# Patient Record
Sex: MALE | Race: WHITE | NOT HISPANIC OR LATINO | Employment: STUDENT | URBAN - METROPOLITAN AREA
[De-identification: names, ages, dates, MRNs, and addresses within clinical notes are randomized per-mention and may not be internally consistent; named-entity substitution may affect disease eponyms.]

---

## 2017-08-09 ENCOUNTER — HOSPITAL ENCOUNTER (OUTPATIENT)
Dept: CARDIOLOGY | Facility: CLINIC | Age: 22
Discharge: HOME | End: 2017-08-09
Payer: COMMERCIAL

## 2017-08-09 ENCOUNTER — CLINICAL SUPPORT (OUTPATIENT)
Dept: PEDIATRIC CARDIOLOGY | Facility: CLINIC | Age: 22
End: 2017-08-09
Payer: COMMERCIAL

## 2017-08-09 ENCOUNTER — OFFICE VISIT (OUTPATIENT)
Dept: CARDIOLOGY | Facility: CLINIC | Age: 22
End: 2017-08-09
Payer: COMMERCIAL

## 2017-08-09 VITALS
SYSTOLIC BLOOD PRESSURE: 125 MMHG | DIASTOLIC BLOOD PRESSURE: 78 MMHG | HEIGHT: 69 IN | WEIGHT: 128.5 LBS | BODY MASS INDEX: 19.03 KG/M2 | HEART RATE: 85 BPM | OXYGEN SATURATION: 99 %

## 2017-08-09 DIAGNOSIS — Q20.3 D-TGA (DEXTRO-TRANSPOSITION OF GREAT ARTERIES): Primary | ICD-10-CM

## 2017-08-09 DIAGNOSIS — Q21.0 VSD (VENTRICULAR SEPTAL DEFECT): ICD-10-CM

## 2017-08-09 DIAGNOSIS — Z87.74 S/P COARCTATION STENT: ICD-10-CM

## 2017-08-09 DIAGNOSIS — Q21.0 VSD (VENTRICULAR SEPTAL DEFECT): Primary | ICD-10-CM

## 2017-08-09 DIAGNOSIS — Z95.828 S/P COARCTATION STENT: ICD-10-CM

## 2017-08-09 DIAGNOSIS — Q24.9 ADULT CONGENITAL HEART DISEASE: ICD-10-CM

## 2017-08-09 DIAGNOSIS — Q20.3 D-TGA (DEXTRO-TRANSPOSITION OF GREAT ARTERIES): ICD-10-CM

## 2017-08-09 DIAGNOSIS — Z98.890 PERSONAL HISTORY OF SURGERY TO HEART AND GREAT VESSELS, PRESENTING HAZARDS TO HEALTH: ICD-10-CM

## 2017-08-09 DIAGNOSIS — Q20.3 TRANSPOSITION OF THE GREAT ARTERIES: ICD-10-CM

## 2017-08-09 DIAGNOSIS — Q25.1 AORTIC COARCTATION: ICD-10-CM

## 2017-08-09 PROCEDURE — 3008F BODY MASS INDEX DOCD: CPT | Mod: S$GLB,,, | Performed by: PEDIATRICS

## 2017-08-09 PROCEDURE — 93303 ECHO TRANSTHORACIC: CPT | Mod: S$GLB,,, | Performed by: PEDIATRICS

## 2017-08-09 PROCEDURE — 99999 PR PBB SHADOW E&M-EST. PATIENT-LVL III: CPT | Mod: PBBFAC,,, | Performed by: PEDIATRICS

## 2017-08-09 PROCEDURE — 93325 DOPPLER ECHO COLOR FLOW MAPG: CPT | Mod: S$GLB,,, | Performed by: PEDIATRICS

## 2017-08-09 PROCEDURE — 93000 ELECTROCARDIOGRAM COMPLETE: CPT | Mod: S$GLB,,, | Performed by: INTERNAL MEDICINE

## 2017-08-09 PROCEDURE — 99214 OFFICE O/P EST MOD 30 MIN: CPT | Mod: S$GLB,,, | Performed by: PEDIATRICS

## 2017-08-09 PROCEDURE — 93227 XTRNL ECG REC<48 HR R&I: CPT | Mod: S$GLB,,, | Performed by: PEDIATRICS

## 2017-08-09 PROCEDURE — 93320 DOPPLER ECHO COMPLETE: CPT | Mod: S$GLB,,, | Performed by: PEDIATRICS

## 2017-08-10 LAB
DIASTOLIC DYSFUNCTION: NO
ESTIMATED PA SYSTOLIC PRESSURE: 35.76
RETIRED EF AND QEF - SEE NOTES: 55 (ref 55–65)
TRICUSPID VALVE REGURGITATION: NORMAL

## 2017-08-10 NOTE — PROGRESS NOTES
2017    re:Livia May  :1995    [facility]    Isaías Lambert MD  [address] [facility] [address]        Patient ID:  Livia May is a 22 y.o. male who presents for follow-up of complex congenital heart disease.  To summarize, his diagnoses are as follows:  1.  TGA/VSD s/p arterial [facility]   2.  Coarctation stent ( -Claudio).  3.  Residual LV to RA shunt (1.4:1 shunt on cath , very small shunt noted on  cardiac catheterization)  4.  Normal coronaries on cath   5.  mild aortic insufficiency and aortic root enlargement  6.  Likely very mild branch pulmonary artery stenosis  7.  Likely PFO (cath )  8.  Mildly abnormal appearing tricuspid valve without significant tricuspid insufficiency or stenosis    Patient denies any symptoms related to the cardiovascular system.  No chest pain, shortness of breath, syncope, near syncope, palpitations, edema, headaches.  No chronic cough.      Review of Systems   Constitution: Negative.   HENT: Negative.    Eyes: Negative.    Cardiovascular: Negative.    Respiratory: Negative.    Endocrine: Negative.    Hematologic/Lymphatic: Negative.    Skin: Negative.    Musculoskeletal: Negative.    Gastrointestinal: Negative.    Genitourinary: Negative.    Neurological: Negative.    Psychiatric/Behavioral: Negative.    Allergic/Immunologic: Negative.      Past Medical History:   Diagnosis Date    Coarctation of aorta, congenital     Diaphragmatic paralysis     Strabismus     TGA/VSD (transposition of great arteries, ventricular septal defect)      Past Surgical History:   Procedure Laterality Date    ARTERIAL SWITCH W/ VENTRICULAR SEPTAL DEFECT CLOSURE      CARDIAC CATHETERIZATION  10/11/07    CARDIAC CATHETERIZATION  12    coarctation of aorta repair/with TGA switch      DIAPHRAGM PLICATION      STRABISMUS SURGERY       Family History   Problem Relation Age  "of Onset    Asthma Brother     Congenital heart disease Neg Hx     Early death Neg Hx      Social History     Social History    Marital status: Single     Spouse name: N/A    Number of children: N/A    Years of education: N/A     Social History Main Topics    Smoking status: Never Smoker    Smokeless tobacco: Never Used    Alcohol use No    Drug use: No    Sexual activity: Not Asked     Other Topics Concern    None     Social History Narrative    Just graduated from [employer] with a degree in general studies.  Looking for a job.     Current Outpatient Prescriptions on File Prior to Visit   Medication Sig Dispense Refill    lisinopril (PRINIVIL,ZESTRIL) 5 MG tablet TAKE 1 TABLET BY MOUTH DAILY **May Cause Dizziness** 30 tablet 12    lisinopril (PRINIVIL,ZESTRIL) 5 MG tablet TAKE 1 TABLET BY MOUTH DAILY **May Cause Dizziness** 30 tablet 12     No current facility-administered medications on file prior to visit.      Review of patient's allergies indicates:  No Known Allergies    /78 (BP Location: Left leg, Patient Position: Sitting, BP Method: Automatic)   Pulse 85   Ht 5' 9" (1.753 m)   Wt 58.3 kg (128 lb 8.5 oz)   SpO2 99%   BMI 18.98 kg/m² RL BP 93/52       Objective:    Physical Exam   Constitutional: He is oriented to person, place, and time. He appears well-developed and well-nourished. No distress.   HENT:   Head: Normocephalic and atraumatic.   Right Ear: External ear normal.   Left Ear: External ear normal.   Nose: Nose normal.   Mouth/Throat: Oropharynx is clear and moist. No oropharyngeal exudate.   Eyes: Conjunctivae and EOM are normal. Pupils are equal, round, and reactive to light. Right eye exhibits no discharge. Left eye exhibits no discharge. No scleral icterus.   Neck: Normal range of motion. Neck supple. No JVD present. No tracheal deviation present. No thyromegaly present.   Cardiovascular: Normal rate, regular rhythm, S1 normal, S2 normal and intact distal pulses.  " Exam reveals no gallop and no friction rub.    Murmur heard.  High-pitched blowing early systolic murmur is present with a grade of 1/6  at the lower left sternal border  Pulses:       Carotid pulses are 2+ on the right side, and 2+ on the left side.       Radial pulses are 2+ on the right side, and 2+ on the left side.        Femoral pulses are 2+ on the right side, and 2+ on the left side.       Dorsalis pedis pulses are 2+ on the right side, and 2+ on the left side.   2/6 harsh, squeaky murmur at LLSB and apex   Pulmonary/Chest: Effort normal and breath sounds normal. No stridor. No respiratory distress. He has no wheezes. He has no rales. He exhibits no tenderness.   well healed sternotomy   Abdominal: Soft. Bowel sounds are normal. He exhibits no distension and no mass. There is no tenderness. There is no rebound and no guarding.   Musculoskeletal: Normal range of motion. He exhibits no edema, tenderness or deformity.   Lymphadenopathy:     He has no cervical adenopathy.   Neurological: He is alert and oriented to person, place, and time. No cranial nerve deficit. He exhibits normal muscle tone. Coordination normal.   Skin: Skin is warm and dry. He is not diaphoretic.   Psychiatric: He has a normal mood and affect. His behavior is normal. Judgment and thought content normal.         ECHO: TGA/VSD s/p ASO and VSD closure. Tiny residual VSD, probably LV to RA shunt. Coarctation stent widely patent.  (peak velocity 2.3 m/s without  run off pattern).  Branch PAs difficult to visualize.  Great function.  The tricuspid valve looks a little bit abnormal and floppy, but it works normally.  There is mild aortic valve insufficiency.  It is difficult to image the right ventricular outflow tract.    EKG with normal sinus rhythm and a RBBB - no change    CPX 2016:  Moderate functional impairment associated with a borderline reduced breathing reserve, normal oxygen saturation, a good effort, and a borderline reduced AT.  These findings are indicative of functional impairment secondary to deconditioning and possible   ventilatory impairment.  4) The PkVO2 was 29.9 ml/kg/min which is 62% of predicted equating to a functional capacity of 8.5 METS indicating moderate functional impairment    I reviewed the cardiac catheterization report from January 12, 2012:  1.  A small residual left ventricle to right atrial shunt was noted.  2.  The coarctation was stented with a 26 x 10 Tito LD stent  3.  There was about a 14-16 mmHg gradient from the right ventricle into the branch pulmonary arteries    A cardiac catheterization in 2007 revealed normal coronary arteries.     Plan:  1. Continue lisinopril  2. No activity restriction   3. SBEP is indicated due to residual VSD  4. Follow up in 1 year with cardiac MRI, EKG, echocardiogram  5. Holter today    Discussion:  He looks great.  We discussed the common problems associated with his surgeries.  My biggest worry is the risk of hypertension worsening over time.  We discussed the risk of passing this on to offspring down the line.  We discussed long term insurance needs as well.      Thank you for referring this patient to our clinic.  Please call with any questions.    Sincerely,        Patrick Godfrey MD  Pediatric Cardiology  Adult Congenital Heart Disease  Pediatric Heart Failure and Transplantation  [facility] [address] [place]  [address]  [phone]

## 2017-08-18 ENCOUNTER — TELEPHONE (OUTPATIENT)
Dept: PEDIATRIC CARDIOLOGY | Facility: CLINIC | Age: 22
End: 2017-08-18

## 2017-12-18 RX ORDER — LISINOPRIL 5 MG/1
TABLET ORAL
Qty: 30 TABLET | Refills: 12 | Status: ON HOLD | OUTPATIENT
Start: 2017-12-18 | End: 2018-06-13 | Stop reason: HOSPADM

## 2017-12-18 RX ORDER — LISINOPRIL 5 MG/1
TABLET ORAL
Qty: 30 TABLET | Refills: 12 | Status: SHIPPED | OUTPATIENT
Start: 2017-12-18 | End: 2020-09-17

## 2018-02-20 ENCOUNTER — TELEPHONE (OUTPATIENT)
Dept: PEDIATRIC CARDIOLOGY | Facility: CLINIC | Age: 23
End: 2018-02-20
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
## 2018-02-21 ENCOUNTER — TELEPHONE (OUTPATIENT)
Dept: PEDIATRIC CARDIOLOGY | Facility: CLINIC | Age: 23
End: 2018-02-21

## 2018-02-21 NOTE — TELEPHONE ENCOUNTER
Spoke with mom via telephone. Patient having tachycardia with little exertion. Mom would like holter. Ok per Dr. Godfrey. Will mail out holter. Informed mom to let patient continue with daily activities and if symptoms persist or worsen to be evaluated by ED. Informed mom to let office know if additional symptoms develop.    ----- Message from Liza Jackson sent at 2/21/2018  3:41 PM CST -----  Contact: Mom Natalee [phone]  Mom states that today,  patient has been having a rapid heartbeat after very little exertion. Mom states that she would like to get the  holter for patient. Please advise.

## 2018-02-23 ENCOUNTER — CLINICAL SUPPORT (OUTPATIENT)
Dept: PEDIATRIC CARDIOLOGY | Facility: CLINIC | Age: 23
End: 2018-02-23
Attending: PEDIATRICS
Payer: COMMERCIAL

## 2018-02-23 DIAGNOSIS — Q21.0 VSD (VENTRICULAR SEPTAL DEFECT): ICD-10-CM

## 2018-02-23 DIAGNOSIS — Z87.74 S/P COARCTATION STENT: ICD-10-CM

## 2018-02-23 DIAGNOSIS — Z95.828 S/P COARCTATION STENT: ICD-10-CM

## 2018-02-23 DIAGNOSIS — Q24.9 ADULT CONGENITAL HEART DISEASE: ICD-10-CM

## 2018-02-23 PROCEDURE — 93227 XTRNL ECG REC<48 HR R&I: CPT | Mod: S$GLB,,, | Performed by: PEDIATRICS

## 2018-03-08 ENCOUNTER — TELEPHONE (OUTPATIENT)
Dept: PEDIATRIC CARDIOLOGY | Facility: CLINIC | Age: 23
End: 2018-03-08

## 2018-03-08 NOTE — TELEPHONE ENCOUNTER
Holter discussed with the mother.  Patient feels much better with resolved palpitations.  Holter with one brief run of SVT and rare PACs.  Plan:  1.  No treatment at present  2.  If symptoms return, call to discuss beta blocker.  3.  Prolonged palpitations should prompt an ER visit.  Mom is an ER NP and is knowledgeable about this.  4.  Follow up as planned.

## 2018-06-09 ENCOUNTER — HOSPITAL ENCOUNTER (INPATIENT)
Facility: HOSPITAL | Age: 23
LOS: 3 days | Discharge: HOME | DRG: 273 | End: 2018-06-13
Attending: INTERNAL MEDICINE | Admitting: INTERNAL MEDICINE
Payer: COMMERCIAL

## 2018-06-09 DIAGNOSIS — R55 SYNCOPE AND COLLAPSE: ICD-10-CM

## 2018-06-09 DIAGNOSIS — Q20.5: ICD-10-CM

## 2018-06-09 DIAGNOSIS — Q20.3 TRANSPOSITION OF THE GREAT ARTERIES: ICD-10-CM

## 2018-06-09 DIAGNOSIS — Q20.3 TRANSPOSITION GREAT ARTERIES: ICD-10-CM

## 2018-06-09 DIAGNOSIS — Z87.74 S/P COARCTATION STENT: ICD-10-CM

## 2018-06-09 DIAGNOSIS — Q25.1 AORTIC COARCTATION: ICD-10-CM

## 2018-06-09 DIAGNOSIS — Z95.828 S/P COARCTATION STENT: ICD-10-CM

## 2018-06-09 DIAGNOSIS — Q24.9 ADULT CONGENITAL HEART DISEASE: ICD-10-CM

## 2018-06-09 DIAGNOSIS — Z98.890 PERSONAL HISTORY OF SURGERY TO HEART AND GREAT VESSELS, PRESENTING HAZARDS TO HEALTH: ICD-10-CM

## 2018-06-09 DIAGNOSIS — R07.9 CHEST PAIN: ICD-10-CM

## 2018-06-09 DIAGNOSIS — I10 ESSENTIAL HYPERTENSION: ICD-10-CM

## 2018-06-09 DIAGNOSIS — R55 SYNCOPE: ICD-10-CM

## 2018-06-09 DIAGNOSIS — Q21.0 VSD (VENTRICULAR SEPTAL DEFECT): ICD-10-CM

## 2018-06-09 DIAGNOSIS — R55 VASOVAGAL SYNCOPE: Primary | ICD-10-CM

## 2018-06-09 LAB
ABO + RH BLD: NORMAL
ALBUMIN SERPL BCP-MCNC: 4.1 G/DL
ALP SERPL-CCNC: 63 U/L
ALT SERPL W/O P-5'-P-CCNC: 16 U/L
AMPHET+METHAMPHET UR QL: NEGATIVE
ANION GAP SERPL CALC-SCNC: 7 MMOL/L
AST SERPL-CCNC: 14 U/L
BARBITURATES UR QL SCN>200 NG/ML: NEGATIVE
BASOPHILS # BLD AUTO: 0.03 K/UL
BASOPHILS NFR BLD: 0.3 %
BENZODIAZ UR QL SCN>200 NG/ML: NEGATIVE
BILIRUB SERPL-MCNC: 2.4 MG/DL
BLD GP AB SCN CELLS X3 SERPL QL: NORMAL
BNP SERPL-MCNC: 47 PG/ML
BUN SERPL-MCNC: 12 MG/DL
BZE UR QL SCN: NEGATIVE
CALCIUM SERPL-MCNC: 9.1 MG/DL
CANNABINOIDS UR QL SCN: NEGATIVE
CHLORIDE SERPL-SCNC: 106 MMOL/L
CO2 SERPL-SCNC: 25 MMOL/L
CREAT SERPL-MCNC: 0.8 MG/DL
CREAT UR-MCNC: 113 MG/DL
DIFFERENTIAL METHOD: ABNORMAL
EOSINOPHIL # BLD AUTO: 0.1 K/UL
EOSINOPHIL NFR BLD: 0.6 %
ERYTHROCYTE [DISTWIDTH] IN BLOOD BY AUTOMATED COUNT: 12.6 %
EST. GFR  (AFRICAN AMERICAN): >60 ML/MIN/1.73 M^2
EST. GFR  (NON AFRICAN AMERICAN): >60 ML/MIN/1.73 M^2
ESTIMATED AVG GLUCOSE: 85 MG/DL
ETHANOL UR-MCNC: <10 MG/DL
GLUCOSE SERPL-MCNC: 100 MG/DL
HBA1C MFR BLD HPLC: 4.6 %
HCT VFR BLD AUTO: 43.3 %
HGB BLD-MCNC: 14.6 G/DL
IMM GRANULOCYTES # BLD AUTO: 0.05 K/UL
IMM GRANULOCYTES NFR BLD AUTO: 0.6 %
INR PPP: 1.2
LYMPHOCYTES # BLD AUTO: 1.5 K/UL
LYMPHOCYTES NFR BLD: 17 %
MAGNESIUM SERPL-MCNC: 2.4 MG/DL
MCH RBC QN AUTO: 31.9 PG
MCHC RBC AUTO-ENTMCNC: 33.7 G/DL
MCV RBC AUTO: 95 FL
METHADONE UR QL SCN>300 NG/ML: NEGATIVE
MONOCYTES # BLD AUTO: 0.6 K/UL
MONOCYTES NFR BLD: 7 %
NEUTROPHILS # BLD AUTO: 6.4 K/UL
NEUTROPHILS NFR BLD: 74.5 %
NRBC BLD-RTO: 0 /100 WBC
OPIATES UR QL SCN: NEGATIVE
PCP UR QL SCN>25 NG/ML: NEGATIVE
PHOSPHATE SERPL-MCNC: 3.3 MG/DL
PLATELET # BLD AUTO: 164 K/UL
PMV BLD AUTO: 10.4 FL
POTASSIUM SERPL-SCNC: 3.7 MMOL/L
PROT SERPL-MCNC: 7 G/DL
PROTHROMBIN TIME: 12.1 SEC
RBC # BLD AUTO: 4.57 M/UL
SODIUM SERPL-SCNC: 138 MMOL/L
TOXICOLOGY INFORMATION: NORMAL
TROPONIN I SERPL DL<=0.01 NG/ML-MCNC: <0.006 NG/ML
TSH SERPL DL<=0.005 MIU/L-ACNC: 0.88 UIU/ML
WBC # BLD AUTO: 8.58 K/UL

## 2018-06-09 PROCEDURE — 93010 ELECTROCARDIOGRAM REPORT: CPT | Mod: 76,,, | Performed by: INTERNAL MEDICINE

## 2018-06-09 PROCEDURE — 84443 ASSAY THYROID STIM HORMONE: CPT

## 2018-06-09 PROCEDURE — 83880 ASSAY OF NATRIURETIC PEPTIDE: CPT

## 2018-06-09 PROCEDURE — 84100 ASSAY OF PHOSPHORUS: CPT

## 2018-06-09 PROCEDURE — 84484 ASSAY OF TROPONIN QUANT: CPT

## 2018-06-09 PROCEDURE — G0378 HOSPITAL OBSERVATION PER HR: HCPCS

## 2018-06-09 PROCEDURE — 99223 1ST HOSP IP/OBS HIGH 75: CPT | Mod: ,,, | Performed by: HOSPITALIST

## 2018-06-09 PROCEDURE — 85025 COMPLETE CBC W/AUTO DIFF WBC: CPT

## 2018-06-09 PROCEDURE — 93005 ELECTROCARDIOGRAM TRACING: CPT

## 2018-06-09 PROCEDURE — 80307 DRUG TEST PRSMV CHEM ANLYZR: CPT

## 2018-06-09 PROCEDURE — 93010 ELECTROCARDIOGRAM REPORT: CPT | Mod: ,,, | Performed by: INTERNAL MEDICINE

## 2018-06-09 PROCEDURE — 80053 COMPREHEN METABOLIC PANEL: CPT

## 2018-06-09 PROCEDURE — 25000003 PHARM REV CODE 250: Performed by: HOSPITALIST

## 2018-06-09 PROCEDURE — 86850 RBC ANTIBODY SCREEN: CPT

## 2018-06-09 PROCEDURE — S5010 5% DEXTROSE AND 0.45% SALINE: HCPCS | Performed by: HOSPITALIST

## 2018-06-09 PROCEDURE — 83735 ASSAY OF MAGNESIUM: CPT

## 2018-06-09 PROCEDURE — 83036 HEMOGLOBIN GLYCOSYLATED A1C: CPT

## 2018-06-09 PROCEDURE — 85610 PROTHROMBIN TIME: CPT

## 2018-06-09 RX ORDER — IBUPROFEN 200 MG
16 TABLET ORAL
Status: DISCONTINUED | OUTPATIENT
Start: 2018-06-09 | End: 2018-06-13 | Stop reason: HOSPADM

## 2018-06-09 RX ORDER — IBUPROFEN 200 MG
24 TABLET ORAL
Status: DISCONTINUED | OUTPATIENT
Start: 2018-06-09 | End: 2018-06-13 | Stop reason: HOSPADM

## 2018-06-09 RX ORDER — ONDANSETRON 8 MG/1
8 TABLET, ORALLY DISINTEGRATING ORAL EVERY 8 HOURS PRN
Status: DISCONTINUED | OUTPATIENT
Start: 2018-06-09 | End: 2018-06-13 | Stop reason: HOSPADM

## 2018-06-09 RX ORDER — ACETAMINOPHEN 325 MG/1
650 TABLET ORAL EVERY 4 HOURS PRN
Status: DISCONTINUED | OUTPATIENT
Start: 2018-06-09 | End: 2018-06-13 | Stop reason: HOSPADM

## 2018-06-09 RX ORDER — GLUCAGON 1 MG
1 KIT INJECTION
Status: DISCONTINUED | OUTPATIENT
Start: 2018-06-09 | End: 2018-06-13 | Stop reason: HOSPADM

## 2018-06-09 RX ORDER — LISINOPRIL 5 MG/1
5 TABLET ORAL DAILY
Status: DISCONTINUED | OUTPATIENT
Start: 2018-06-09 | End: 2018-06-13 | Stop reason: HOSPADM

## 2018-06-09 RX ORDER — SODIUM CHLORIDE 0.9 % (FLUSH) 0.9 %
5 SYRINGE (ML) INJECTION
Status: DISCONTINUED | OUTPATIENT
Start: 2018-06-09 | End: 2018-06-13 | Stop reason: HOSPADM

## 2018-06-09 RX ORDER — DEXTROSE MONOHYDRATE AND SODIUM CHLORIDE 5; .45 G/100ML; G/100ML
INJECTION, SOLUTION INTRAVENOUS ONCE
Status: COMPLETED | OUTPATIENT
Start: 2018-06-09 | End: 2018-06-09

## 2018-06-09 RX ADMIN — LISINOPRIL 5 MG: 5 TABLET ORAL at 09:06

## 2018-06-09 RX ADMIN — DEXTROSE AND SODIUM CHLORIDE: 5; .45 INJECTION, SOLUTION INTRAVENOUS at 09:06

## 2018-06-09 NOTE — CONSULTS
Patient with complex congenital heart disease that follows with Dr. Patrick Godfrey that presents with syncope and a past history of SVT on Holter. Would consult pediatric electrophysiology for evaluation.    Ortiz Recinos MD, MPH  PGY-V  Cardiovascular Disease Fellow    I did not evaluate this patient. Pedi EP to evaluate. If adult EP input is desired, please call or consult.  IVANA Ley

## 2018-06-09 NOTE — CONSULTS
Thank you for referring your patient Livia May to the Adult Congenital Cardiology Service for consultation. The patient is accompanied by his mother and father. Please review my findings below.    CHIEF COMPLAINT: Evaluation of syncopal event with congenital heart disease.    Consulting Physician: Padma Jimenez MD    HISTORY OF PRESENT ILLNESS:   Livia May is a 22 y.o. male who presents for follow-up of complex congenital heart disease who has been followed by Dr. Patrick Godfrey in the Adult Cardiology clinic here.  To summarize, his diagnoses are as follows:  1.  TGA/VSD s/p arterial switch procedure and VSD closure (Dr. Raygoza)   2.  Coarctation stent (2012 -Claudio).  3.  Residual LV to RA shunt (1.4:1 shunt on cath 2007, very small shunt noted on 2012 cardiac catheterization)  4.  Normal coronaries on cath 2007  5.  Mild aortic insufficiency and aortic root enlargement  6.  Likely very mild branch pulmonary artery stenosis  7.  Likely PFO (cath 2007)  8.  Mildly abnormal appearing tricuspid valve without significant tricuspid insufficiency or stenosis     HPI:  He was doing well until earlier yesterday around 6 pm when  suddenly he became dizzy and his vision became blurry while trimming his nails standing up in the kitchen. He turned pale and diaphoretic, then passed out while his mother was there with him. His parents sat him in a chair initially and then laid him on the floor when he did not recover quickly.  She was unable to feel a pulse at the time.  In the Feb 2018 he had a holter which showed single run of short nonsustained SVT.  She activated EMS and he slowly gained consciousness prior to EMS arrival. By the time he arrived at the ED he was back to baseline.      Per Livia he does not routinely have orthostatic dizziness.  He has never passed out in the past.  He had heart racing in February prior to the Holter but none noted during Holter and none since.  He has no fatigue or activity  intolerance.  He has no difficulty breathing.  He has no other recent chest pain.      REVIEW OF SYSTEMS:     GENERAL: No fever, chills, fatigability or weight loss.  SKIN: No rashes, itching or changes in color or texture of skin.  CHEST: Denies ZARCO, cyanosis, wheezing, cough and sputum production.  CARDIOVASCULAR: Denies chest pain or reduced exercise tolerance.  ABDOMEN: Appetite fine. No weight loss. Denies diarrhea, abdominal pain, or vomiting.  PERIPHERAL VASCULAR: No claudication or cyanosis.  MUSCULOSKELETAL: No joint stiffness or swelling.   NEUROLOGIC: No history of seizures,  alteration of gait or coordination.    PAST MEDICAL HISTORY:   Past Medical History:   Diagnosis Date    Coarctation of aorta, congenital     Diaphragmatic paralysis     Strabismus     TGA/VSD (transposition of great arteries, ventricular septal defect)            FAMILY HISTORY:   Family History   Problem Relation Age of Onset    Asthma Brother     Congenital heart disease Neg Hx     Early death Neg Hx          SOCIAL HISTORY:   Social History     Social History    Marital status: Single     Spouse name: N/A    Number of children: N/A    Years of education: N/A     Occupational History    Not on file.     Social History Main Topics    Smoking status: Never Smoker    Smokeless tobacco: Never Used    Alcohol use No    Drug use: No    Sexual activity: Not on file     Other Topics Concern    Not on file     Social History Narrative    Just graduated from [employer] with a degree in general studies.  Looking for a job.       ALLERGIES:  Review of patient's allergies indicates:  No Known Allergies    MEDICATIONS:    Current Facility-Administered Medications:     acetaminophen tablet 650 mg, 650 mg, Oral, Q4H PRN, Arup K. Joyce, DO    dextrose 50% injection 12.5 g, 12.5 g, Intravenous, PRN, Arup K. Joyce, DO    dextrose 50% injection 25 g, 25 g, Intravenous, PRN, Arup K. Joyce, DO    glucagon (human recombinant)  injection 1 mg, 1 mg, Intramuscular, PRN, Arup K. Joyce, DO    glucose chewable tablet 16 g, 16 g, Oral, PRN, Arup K. Joyce, DO    glucose chewable tablet 24 g, 24 g, Oral, PRN, Arup K. Joyce, DO    lisinopril tablet 5 mg, 5 mg, Oral, Daily, Arup K. Joyce, DO, 5 mg at 18 0900    ondansetron disintegrating tablet 8 mg, 8 mg, Oral, Q8H PRN, Arup K. Joyce, DO    sodium chloride 0.9% flush 5 mL, 5 mL, Intravenous, PRN, Arup K. Joyce, DO      PHYSICAL EXAM:   Vitals:    18 0409 18 0410 18 0600 18 0742   BP: 116/61 116/61  118/63   BP Location:    Right arm   Patient Position: Sitting Standing  Lying   Pulse: 89  81 82   Resp:    16   Temp:    97.9 °F (36.6 °C)   TempSrc:    Oral   SpO2:    95%   Weight:       Height:             GENERAL: Awake, well-developed well-nourished, no apparent distress  HEENT: mucous membranes moist and pink, normocephalic atraumatic, no cranial or carotid bruits, sclera anicteric  NECK: no jugular venous distention, no lymphadenopathy  CHEST: Good air movement, clear to auscultation bilaterally, pectus deformity noted.  CARDIOVASCULAR: Quiet precordium, regular rate and rhythm, S1S2, no rubs or gallops, High-pitched blowing early systolic murmur is present with a grade of 1/6  at the lower left sternal border  ABDOMEN: Soft, nontender nondistended, no hepatomegaly, no aortic bruits  EXTREMITIES: Warm well perfused, 2+ radial/pedal pulses, capillary refill 2 seconds, no clubbing, cyanosis, or edema  NEURO: Alert and oriented, cooperative with exam, face symmetric, moves all extremities well    STUDIES:  ECG:  Normal sinus rhythm, right bundle branch block    ASSESSMENT:  21 y/o male with congenital heart disease includin.  TGA/VSD s/p arterial switch procedure and VSD closure (Dr. Raygoza)   2.  Coarctation stent (2012).  3.  Residual LV to RA shunt (1.4:1 shunt on cath , very small shunt noted on  cardiac catheterization)  4.  Normal  coronaries on cath 2007  5.  Mild aortic insufficiency and aortic root enlargement  6.  Likely very mild branch pulmonary artery stenosis  7.  Likely PFO (cath 2007)  8.  Mildly abnormal appearing tricuspid valve without significant tricuspid insufficiency or stenosis.  9. History of nonsustained SVT on Holter 2/2018.  10. Unexplained syncope for which he is currently admitted (consider neurocardiogenic syncope vs. Primary arrhythmia in face of congenital heart disease).       PLAN:   - Cardiac CTA to evaluate coronary arteries.    - Agree with obtaining ECHO to evaluate function and evaluate coarctation stent gradient and look for residual shunt.  - Consult Adult EP tomorrow to evaluate and perform EP study and possible ablation and possible loop implant that would be done either Monday or Tuesday (I spoke with Dr. Ley who agreed to see Livia).  - No need to be NPO at this time, will need to be NPO as per - Dr. Ley prior to anticipated EP study.  - No cardiac medications at this time.  - Dr. Godfrey will see Livia tomorrow for our [facility] service.  - Please call with any additional questions or concerns.      Time Spent: 60 (min) with over 50% in direct patient and family consultation regarding evaluation and management of syncope in face of congenital heart disease.      I hope this brings you up-to-date on Livia ALFARO Fell  Please contact me with any questions or concerns.    Arvind Acuña MD  Pediatric and Adult Congenital Electrophysiologist  Pediatric Cardiologist

## 2018-06-09 NOTE — NURSING
Arrive with ems accompanied by mother  Transferred to bed without difficulty  nsr per tele vss  Verbalized understanding of fall precautions, I&O, call bell

## 2018-06-09 NOTE — H&P
"[facility] Hospital Medicine  History & Physical    Patient Name: Livia May  MRN: [id number]  Admission Date: 6/9/2018  Attending Physician: Padma Jimenez MD   Primary Care Provider: Isaías Lambert MD    [facility] Team: Networked reference to record PCT  Darrion Cook DO     Patient information was obtained from patient, parent and outside records .     Subjective:     Principal Problem:Syncope    Chief Complaint: " I passed out and collapsed"       Reason for transfer:  COMPLEX CONGENITAL HEART PATIENT WITH SYNCOPE     Report from Physician/Mid-Level Provider:  Livia May is a 22 y.o. male who presents for follow-up of complex congenital heart disease who has been followed by Dr. Patrick Godfrey in the Adult Cardiology clinic here.  To summarize, his diagnoses are as follows:  1.  TGA/VSD s/p arterial switch procedure and VSD closure (Dr. LongoriaBanner)   2.  Coarctation stent (2012 -[facility]audio).  3.  Residual LV to RA shunt (1.4:1 shunt on cath 2007, very small shunt noted on 2012 cardiac catheterization)  4.  Normal coronaries on cath 2007  5.  Mild aortic insufficiency and aortic root enlargement  6.  Likely very mild branch pulmonary artery stenosis  7.  Likely PFO (cath 2007)  8.  Mildly abnormal appearing tricuspid valve without significant tricuspid insufficiency or stenosis     HPI:  He was doing well until earlier yesterday around 6 pm when  suddenly he became dizzy and his vision became blurry while trimming his nails standing up in the kitchen. He turned pale and diaphoretic, then passed out while his mother was there with him. Mother caught him and lay him to the floor to prevent the fall.  She was unable to feel a pulse at the time.  In the past he has had a holter which showed SVT.  She activated EMS and he slowly gained consciousness prior to EMS arrival. By the time he arrived at the ED he was back to baseline.  The pediatric Cardiologist there felt he needed to come to " [facility], so cardiology was contacted and is willing to assist in his care. He has annual clinic visit coming up with Dr. Godfrey in august.      VS: /77, P 88, RR 13, %, T98.0F     LABS: BMP - NML, CBC - NML, TROP - NEGATIVE, BNP - NML     RADIOGRAPHS:  CXR: no acute findings     EKG: RBBB with no changes from old, and no ischemic changes     Currently he is asymptomatic. Conversant, AAO X 4. Mother at bedside reports he looks back to his normal self.     Past Medical History:   Diagnosis Date    Coarctation of aorta, congenital     Diaphragmatic paralysis     Strabismus     TGA/VSD (transposition of great arteries, ventricular septal defect)        Past Surgical History:   Procedure Laterality Date    ARTERIAL SWITCH W/ VENTRICULAR SEPTAL DEFECT CLOSURE      CARDIAC CATHETERIZATION  10/11/07    CARDIAC CATHETERIZATION  1/17/12    coarctation of aorta repair/with TGA switch      DIAPHRAGM PLICATION      STRABISMUS SURGERY         Review of patient's allergies indicates:  No Known Allergies    No current facility-administered medications on file prior to encounter.      Current Outpatient Prescriptions on File Prior to Encounter   Medication Sig    lisinopril (PRINIVIL,ZESTRIL) 5 MG tablet TAKE 1 TABLET BY MOUTH DAILY **May Cause Dizziness**    lisinopril (PRINIVIL,ZESTRIL) 5 MG tablet TAKE 1 TABLET BY MOUTH DAILY **May Cause Dizziness**     Family History     Problem Relation (Age of Onset)    Asthma Brother        Social History Main Topics    Smoking status: Never Smoker    Smokeless tobacco: Never Used    Alcohol use No    Drug use: No    Sexual activity: Not on file     Review of Systems   Constitutional: Negative for activity change, appetite change, chills, diaphoresis, fatigue, fever and unexpected weight change.   HENT: Negative for congestion, dental problem, drooling, ear discharge, ear pain, facial swelling, hearing loss, mouth sores, nosebleeds, postnasal drip, rhinorrhea,  sinus pressure, sneezing, sore throat, tinnitus, trouble swallowing and voice change.    Eyes: Negative for photophobia, pain, discharge, redness, itching and visual disturbance.   Respiratory: Negative for apnea, cough, choking, chest tightness, shortness of breath, wheezing and stridor.    Cardiovascular: Negative for chest pain, palpitations and leg swelling.   Gastrointestinal: Negative for abdominal distention, abdominal pain, anal bleeding, blood in stool, constipation, diarrhea, nausea, rectal pain and vomiting.   Endocrine: Negative for cold intolerance, heat intolerance, polydipsia, polyphagia and polyuria.   Genitourinary: Negative for decreased urine volume, difficulty urinating, discharge, dysuria, enuresis, flank pain, frequency, genital sores, hematuria, penile pain, penile swelling, scrotal swelling, testicular pain and urgency.   Musculoskeletal: Negative for arthralgias, back pain, gait problem, joint swelling, myalgias, neck pain and neck stiffness.   Skin: Positive for color change. Negative for pallor, rash and wound.   Allergic/Immunologic: Negative for environmental allergies, food allergies and immunocompromised state.   Neurological: Positive for syncope. Negative for dizziness, tremors, seizures, facial asymmetry, speech difficulty, weakness, light-headedness, numbness and headaches.   Hematological: Negative for adenopathy. Does not bruise/bleed easily.   Psychiatric/Behavioral: Negative for agitation, behavioral problems, confusion, decreased concentration, dysphoric mood, hallucinations, self-injury, sleep disturbance and suicidal ideas. The patient is not nervous/anxious and is not hyperactive.      Objective:     Vital Signs (Most Recent):  Temp: 97.5 °F (36.4 °C) (06/09/18 0315)  Pulse: 81 (06/09/18 0600)  Resp: 14 (06/09/18 0315)  BP: 116/61 (06/09/18 0410)  SpO2: 95 % (06/09/18 0315) Vital Signs (24h Range):  Temp:  [97.5 °F (36.4 °C)-98 °F (36.7 °C)] 97.5 °F (36.4 °C)  Pulse:   [] 81  Resp:  [12-16] 14  SpO2:  [95 %-100 %] 95 %  BP: (116-141)/(60-76) 116/61     Weight: 57.4 kg (126 lb 8.7 oz)  Body mass index is 18.69 kg/m².    Physical Exam   Constitutional: He is oriented to person, place, and time. He appears well-developed and well-nourished. No distress.   HENT:   Head: Normocephalic and atraumatic.   Nose: Nose normal.   Mouth/Throat: Oropharynx is clear and moist. No oropharyngeal exudate.   Eyes: Conjunctivae and EOM are normal. Pupils are equal, round, and reactive to light. No scleral icterus.   Neck: Normal range of motion. Neck supple. No JVD present. No tracheal deviation present. No thyromegaly present.   Cardiovascular: Normal rate, regular rhythm and intact distal pulses.    Murmur (II/VI systolic murmurs along sternal border and apex ) heard.  Pulmonary/Chest: Effort normal and breath sounds normal. No respiratory distress. He has no wheezes. He has no rales. He exhibits no tenderness.   Abdominal: Soft. Bowel sounds are normal. He exhibits no distension and no mass. There is no tenderness. There is no rebound and no guarding.   Musculoskeletal: Normal range of motion. He exhibits no edema or tenderness.   Lymphadenopathy:     He has no cervical adenopathy.   Neurological: He is alert and oriented to person, place, and time. He has normal reflexes. He displays normal reflexes. No cranial nerve deficit. He exhibits normal muscle tone. Coordination normal.   Skin: Skin is warm and dry. No rash noted. He is not diaphoretic. No erythema.   Psychiatric: He has a normal mood and affect. Judgment and thought content normal.         CRANIAL NERVES     CN III, IV, VI   Pupils are equal, round, and reactive to light.  Extraocular motions are normal.       Significant Labs:       Significant Imaging: I have reviewed and interpreted all pertinent imaging results/findings within the past 24 hours.     CXR : no acute process     EKG : NSR @ 80 bpm, RBB and RVH with right axis  deviation      2D ECHO ( 08/09/17 ) :    Imaging consistent with diagnosis of d-TGA s/p arterial switch & coarctation s/p stent:   There is prolapse of the tricuspid valve leaflets into the right atrium with reasonable coaptation and mild tricuspid regurgitation at velocity suggesting right ventricular pressure 36 mmHg plus right atrial pressure.  Right ventricle appears normal in size with qualitatively good function.  Color Doppler suggests at least a mild degree of pulmonary insufficiency.  Main and pulmonary branches are not well demonstrated in this study with the anatomy confounded by Rhonda maneuver for arterial switch.  Left ventricle appears normal in size and structure.  Paradoxical septal motion with good movement of the left ventricular free wall,  SF=30 % and EF estimated  55 -60% from apical four chamber views   Diastolic function is normal.   Images suggest but not diagnostic for trileaflet aortic valve with trivial to mild aortic regurgitation.  The ascending aorta appears dilated but is not clearly demonstrated.  Echodensity consistent with stent in descending aorta at site of coarctation with peak velocity <2.3 m/s and no diastolic runoff    Assessment/Plan:     Active Diagnoses:    Diagnosis Date Noted POA    PRINCIPAL PROBLEM:  Syncope [R55] 06/09/2018 Yes    Essential hypertension [I10] 06/09/2018 Yes    Adult congenital heart disease [Q24.9] 05/19/2016 Not Applicable      Problems Resolved During this Admission:    Diagnosis Date Noted Date Resolved POA     # Syncope and collapse   - history and clinical presentation concerning for cardiac arrhythmia   - though vasovagal syncope in DDX given event occurred in standing position with associated diaphoresis   - check troponin and urine toxicology   - EKG showed RBBB and RVH ( no acute change )  - telemetry   - 2DECHO W doppler   -EP consult   - patient well known to Dr. Godfrey from adult congenital heart disease clinic   - continue home dose  of lisinopril 5 mg daily   - NPO for now pending EP evaluation       VTE Risk Mitigation         Ordered     IP VTE LOW RISK PATIENT  Once      06/09/18 0602            Darrion Cook DO  Department of Hospital Medicine   [facility]

## 2018-06-09 NOTE — PLAN OF CARE
Please call extension [phone] (if nobody answers after pressing #1 for admission, this will flip to a beeper after pressing #1 for admission, so put in your call back number) upon patient arrival to floor for Hospital Medicine admit team assignment and for additional admit orders for the patient.       Outside Transfer Acceptance Note     Patients name: BEVERLEY MCKEON MRN: [id number]    Transferring Physician or Mid-Level provider/Clinic giving report:   DR. MALLOYR AT [facility] IN [place]    Accepting Physician for admission to hospital: Eric Bunn M.D.      Date of acceptance:  6/8/18, LEVEL 2, TELEMETRY     Reason for transfer:  COMPLEX CONGENITAL HEART PATIENT WITH SYNCOPE    Report from Physician/Mid-Level Provider:  Beverley Mckeon is a 22 y.o. male who presents for follow-up of complex congenital heart disease who has been followed by Dr. Patrick Godfrey in the Adult Cardiology clinic here.  To summarize, his diagnoses are as follows:  1.  TGA[facility]VSD [facility]   2.  Coarctation stent (2012 -[facility]).  3.  Residual LV to RA shunt (1.4:1 shunt on cath 2007, very small shunt noted on 2012 cardiac catheterization)  4.  Normal coronaries on cath 2007  5.  Mild aortic insufficiency and aortic root enlargement  6.  Likely very mild branch pulmonary artery stenosis  7.  Likely PFO (cath 2007)  8.  Mildly abnormal appearing tricuspid valve without significant tricuspid insufficiency or stenosis    HPI:  He was doing well until earlier today when he suddenly he became dizzy and his vision became blurry while trimming his nails. He turned pale and diaphoretic, then passed out while his mother was there with him.  She was unable to feel a pulse at the time.  In the past he has had a holter which showed SVT.  She activated EMS and by the time he arrived at the ED he was back to baseline.  The pediatric Cardiologist there felt he needed to come here, so cardiology  was contacted and is willing to assist in his care.    VS: /77, P 88, RR 13, %, T98.0F    LABS: BMP - NML, CBC - NML, TROP - NEGATIVE, BNP - NML    RADIOGRAPHS:  CXR: no acute findings    EKG: RBBB with no changes from old, and no ischemic changes    To Do List upon arrival:  Place on IMC and consult Cardiology and EP, telemetry    Please call extension [phone] (if nobody answers after pressing #1 for admission, this will flip to a beeper after pressing #1 for admission, so put in your call back number) upon patient arrival to floor for Hospital Medicine admit team assignment and for additional admit orders for the patient.

## 2018-06-10 PROBLEM — Q20.5: Status: ACTIVE | Noted: 2018-06-10

## 2018-06-10 PROBLEM — R07.89 CHEST TIGHTNESS OR PRESSURE: Status: ACTIVE | Noted: 2018-06-10

## 2018-06-10 PROCEDURE — 25000003 PHARM REV CODE 250: Performed by: HOSPITALIST

## 2018-06-10 PROCEDURE — 25000003 PHARM REV CODE 250: Performed by: NURSE PRACTITIONER

## 2018-06-10 PROCEDURE — 99232 SBSQ HOSP IP/OBS MODERATE 35: CPT | Mod: ,,, | Performed by: PEDIATRICS

## 2018-06-10 PROCEDURE — 99255 IP/OBS CONSLTJ NEW/EST HI 80: CPT | Mod: ,,, | Performed by: INTERNAL MEDICINE

## 2018-06-10 PROCEDURE — 99232 SBSQ HOSP IP/OBS MODERATE 35: CPT | Mod: ,,, | Performed by: NURSE PRACTITIONER

## 2018-06-10 PROCEDURE — G0378 HOSPITAL OBSERVATION PER HR: HCPCS

## 2018-06-10 RX ORDER — POTASSIUM CHLORIDE 750 MG/1
40 CAPSULE, EXTENDED RELEASE ORAL ONCE
Status: COMPLETED | OUTPATIENT
Start: 2018-06-10 | End: 2018-06-10

## 2018-06-10 RX ADMIN — LISINOPRIL 5 MG: 5 TABLET ORAL at 09:06

## 2018-06-10 RX ADMIN — POTASSIUM CHLORIDE 40 MEQ: 750 CAPSULE, EXTENDED RELEASE ORAL at 09:06

## 2018-06-10 NOTE — ASSESSMENT & PLAN NOTE
- during episode of sinus tachycardia, ECG without changes or ischemia   - troponin on admit < 0.006

## 2018-06-10 NOTE — ASSESSMENT & PLAN NOTE
- Pediatric EP and adult EP have both seen and collaborating on patient care  - planning CTA to look at anatomy  - EPS to follow with possible PPM, ablation, ICD vs ILR.

## 2018-06-10 NOTE — SUBJECTIVE & OBJECTIVE
Past Medical History:   Diagnosis Date    Coarctation of aorta, congenital     Diaphragmatic paralysis     Strabismus     TGA/VSD (transposition of great arteries, ventricular septal defect)        Past Surgical History:   Procedure Laterality Date    ARTERIAL SWITCH W/ VENTRICULAR SEPTAL DEFECT CLOSURE      CARDIAC CATHETERIZATION  10/11/07    CARDIAC CATHETERIZATION  1/17/12    coarctation of aorta repair/with TGA switch      DIAPHRAGM PLICATION      STRABISMUS SURGERY         Review of patient's allergies indicates:  No Known Allergies    No current facility-administered medications on file prior to encounter.      Current Outpatient Prescriptions on File Prior to Encounter   Medication Sig    lisinopril (PRINIVIL,ZESTRIL) 5 MG tablet TAKE 1 TABLET BY MOUTH DAILY **May Cause Dizziness**    lisinopril (PRINIVIL,ZESTRIL) 5 MG tablet TAKE 1 TABLET BY MOUTH DAILY **May Cause Dizziness**     Family History     Problem Relation (Age of Onset)    Asthma Brother        Social History Main Topics    Smoking status: Never Smoker    Smokeless tobacco: Never Used    Alcohol use No    Drug use: No    Sexual activity: Not on file     Review of Systems   Constitution: Negative for chills and fever.   HENT: Negative for hoarse voice and sore throat.    Eyes: Positive for blurred vision.   Cardiovascular: Positive for palpitations. Negative for chest pain, claudication, cyanosis, dyspnea on exertion, irregular heartbeat, leg swelling, near-syncope, orthopnea, paroxysmal nocturnal dyspnea and syncope.   Respiratory: Negative for cough, hemoptysis and shortness of breath.    Musculoskeletal: Negative for back pain, joint pain and joint swelling.   Gastrointestinal: Negative for abdominal pain, constipation, diarrhea, hematochezia, melena, nausea and vomiting.   Genitourinary: Negative for dysuria, hematuria and incomplete emptying.   Neurological: Negative for dizziness, headaches and light-headedness.    Psychiatric/Behavioral: Negative for altered mental status, depression and suicidal ideas. The patient is not nervous/anxious.      Objective:     Vital Signs (Most Recent):  Temp: 98.2 °F (36.8 °C) (06/10/18 0937)  Pulse: 94 (06/10/18 0937)  Resp: 18 (06/10/18 0937)  BP: 128/60 (06/10/18 0937)  SpO2: 99 % (06/10/18 0937) Vital Signs (24h Range):  Temp:  [96.3 °F (35.7 °C)-98.2 °F (36.8 °C)] 98.2 °F (36.8 °C)  Pulse:  [] 94  Resp:  [14-20] 18  SpO2:  [95 %-99 %] 99 %  BP: (109-135)/(55-68) 128/60     Weight: 57.4 kg (126 lb 8.7 oz)  Body mass index is 18.69 kg/m².    SpO2: 99 %  O2 Device (Oxygen Therapy): room air    Physical Exam   Constitutional: He is oriented to person, place, and time. He appears well-developed and well-nourished. No distress.   HENT:   Head: Normocephalic and atraumatic.   Mouth/Throat: Oropharynx is clear and moist. No oropharyngeal exudate.   Eyes: Conjunctivae and EOM are normal. Pupils are equal, round, and reactive to light. Right eye exhibits no discharge. Left eye exhibits no discharge. No scleral icterus.   Neck: Normal range of motion. Neck supple. No JVD present. No tracheal deviation present. No thyromegaly present.   Cardiovascular: Normal rate, regular rhythm and intact distal pulses.  Exam reveals no gallop and no friction rub.    Murmur (2/6 systolic murmur in LUSB) heard.  Pulmonary/Chest: Effort normal and breath sounds normal. No respiratory distress. He has no wheezes. He has no rales.   Abdominal: Soft. Bowel sounds are normal. He exhibits no distension and no mass. There is no tenderness. There is no rebound and no guarding.   Musculoskeletal: Normal range of motion.   Lymphadenopathy:     He has no cervical adenopathy.   Neurological: He is alert and oriented to person, place, and time. No cranial nerve deficit.   Skin: Skin is warm and dry. He is not diaphoretic.   Psychiatric: He has a normal mood and affect. His behavior is normal. Judgment and thought content  normal.   Nursing note and vitals reviewed.      Significant Labs:   EP:   Recent Labs  Lab 06/09/18  0617      K 3.7      CO2 25      BUN 12   CREATININE 0.8   CALCIUM 9.1   PROT 7.0   ALBUMIN 4.1   BILITOT 2.4*   ALKPHOS 63   AST 14   ALT 16   ANIONGAP 7*   ESTGFRAFRICA >60.0   EGFRNONAA >60.0   WBC 8.58   HGB 14.6   HCT 43.3      INR 1.2       Significant Imaging: EKG: NSR, RBBB, LPFB

## 2018-06-10 NOTE — PROGRESS NOTES
[facility] Hospital Medicine  Progress Note    Patient Name: Livia May  MRN: [id number]  Patient Class: OP- Observation   Admission Date: 6/9/2018  Length of Stay: 1 days  Attending Physician: Padma Jimenez MD  Primary Care Provider: Isaías Lambert MD    [facility] Medicine Team: [facility] ANGELINA Miles NP    Subjective:     Principal Problem:Syncope    HPI:  Livia May is a 22 y.o. male who presents for follow-up of complex congenital heart disease who has been followed by Dr. Patrick Godfrey in the Adult Cardiology clinic here.  To summarize, his diagnoses are as follows:  1.  TGA/VSD s/p arterial [facility]   2.  Coarctation stent (2012 -[facility]).  3.  Residual LV to RA shunt (1.4:1 shunt on cath 2007, very small shunt noted on 2012 cardiac catheterization)  4.  Normal coronaries on cath 2007  5.  Mild aortic insufficiency and aortic root enlargement  6.  Likely very mild branch pulmonary artery stenosis  7.  Likely PFO (cath 2007)  8.  Mildly abnormal appearing tricuspid valve without significant tricuspid insufficiency or stenosis     HPI:  He was doing well until earlier yesterday around 6 pm when  suddenly he became dizzy and his vision became blurry while trimming his nails standing up in the kitchen. He turned pale and diaphoretic, then passed out while his mother was there with him. Mother caught him and lay him to the floor to prevent the fall.  She was unable to feel a pulse at the time.  In the past he has had a holter which showed SVT.  She activated EMS and he slowly gained consciousness prior to EMS arrival. By the time he arrived at the ED he was back to baseline.  The pediatric Cardiologist there felt he needed to come to [facility], so cardiology was contacted and is willing to assist in his care. He has annual clinic visit coming up with Dr. Godfrey in august.      VS: /77, P 88, RR 13, %, T98.0F     LABS: BMP -  NML, CBC - NML, TROP - NEGATIVE, BNP - NML     RADIOGRAPHS:  CXR: no acute findings     EKG: RBBB with no changes from old, and no ischemic changes     Currently he is asymptomatic. Conversant, AAO X 4. Mother at bedside reports he looks back to his normal self.        Hospital Course:  Admitted to hospital medicine for witnessed syncopal episode.  He revived on his own.   Congenital cards, and pediatric EP were consulted as he has h/o NSVT (5 beats on a holter), he also was getting chest tightness with sinus tachycardia, which he states he normally doesn't get.  He was seen by Dr. Arvind Acuña from pediatric EP who believes this was a vasovagal episode, however with his anatomy, should have a full EPS.  Dr Ley was contacted to perform procedure as Dr. Acuña will be out of town.  Dr. Ley stated his DDx of syncope includes vasovagal, bradycardia (especially given RBBB/LPFB), VT/VF (though recovered without CPR or electrical cardioversion). Also with hx of nonsustained atrial arrhythmias, by report, on prior monitoring.  He agrees with Echo and CTA that are already planned by primary service.  Following those studies, plan is for EPS 6/11/18, with possible ablation, possible PPM vs ICD vs ILR.    Dispo: f/u pediatric EP and Dr. Patrick Godfrey with congenital cardiology, no home needs, mom is an ED NP      Interval History: Patient slept well, no c/o o/n.  No longer tachycardic and no longer having chest tightness. Denies SOB, CP, LH/ Dizziness    Review of Systems   Constitutional: Negative for activity change, appetite change, chills, fatigue and fever.   HENT: Negative for congestion, sore throat and trouble swallowing.    Respiratory: Positive for chest tightness (yesterday with sinus tach episode). Negative for cough, shortness of breath and wheezing.    Cardiovascular: Negative for chest pain, palpitations and leg swelling.   Gastrointestinal: Negative for abdominal pain, constipation, diarrhea, nausea and  vomiting.   Genitourinary: Negative for decreased urine volume, difficulty urinating and hematuria.   Musculoskeletal: Negative for back pain and myalgias.   Skin: Negative for color change, rash and wound.   Neurological: Negative for dizziness, weakness, light-headedness, numbness and headaches.   Hematological: Does not bruise/bleed easily.   Psychiatric/Behavioral: Negative for agitation, decreased concentration and sleep disturbance. The patient is not nervous/anxious.      Objective:     Vital Signs (Most Recent):  Temp: 98.2 °F (36.8 °C) (06/10/18 0937)  Pulse: 94 (06/10/18 0937)  Resp: 18 (06/10/18 0937)  BP: 128/60 (06/10/18 0937)  SpO2: 99 % (06/10/18 0937) Vital Signs (24h Range):  Temp:  [96.3 °F (35.7 °C)-98.2 °F (36.8 °C)] 98.2 °F (36.8 °C)  Pulse:  [] 94  Resp:  [14-20] 18  SpO2:  [95 %-99 %] 99 %  BP: (109-135)/(55-68) 128/60     Weight: 57.4 kg (126 lb 8.7 oz)  Body mass index is 18.69 kg/m².    Intake/Output Summary (Last 24 hours) at 06/10/18 1139  Last data filed at 06/09/18 2200   Gross per 24 hour   Intake              720 ml   Output             1650 ml   Net             -930 ml      Physical Exam   Constitutional: He is oriented to person, place, and time. He appears well-developed and well-nourished. No distress.   HENT:   Head: Normocephalic and atraumatic.   Right Ear: External ear normal.   Left Ear: External ear normal.   Nose: Nose normal.   Eyes: Conjunctivae are normal.   Neck: Normal range of motion. Neck supple. No JVD present.   Cardiovascular: Normal rate, regular rhythm, normal heart sounds and intact distal pulses.    No murmur heard.  Pulmonary/Chest: Effort normal and breath sounds normal. No respiratory distress. He has no wheezes. He has no rales.   Abdominal: Soft. Bowel sounds are normal. He exhibits no distension and no mass. There is no tenderness. There is no guarding.   Musculoskeletal: Normal range of motion. He exhibits no edema.   Neurological: He is alert and  oriented to person, place, and time. No cranial nerve deficit or sensory deficit. Coordination normal.   Skin: Skin is warm and dry. No rash noted. He is not diaphoretic. No erythema.   Psychiatric: He has a normal mood and affect. His behavior is normal. Judgment and thought content normal.   Nursing note and vitals reviewed.      Significant Labs:   BMP:   Recent Labs  Lab 06/09/18  0617         K 3.7      CO2 25   BUN 12   CREATININE 0.8   CALCIUM 9.1   MG 2.4     CBC:   Recent Labs  Lab 06/09/18  0617   WBC 8.58   HGB 14.6   HCT 43.3        Coagulation:   Recent Labs  Lab 06/09/18  0617   INR 1.2       Significant Imaging: No acute findings in the chest.    Assessment/Plan:      * Syncope    - Pediatric EP and adult EP have both seen and collaborating on patient care  - planning CTA to look at anatomy  - EPS to follow with possible PPM, ablation, ICD vs ILR.           Adult congenital heart disease    His diagnoses are as follows:  1.  TGA/VSD s/p arterial [facility]   2.  Coarctation stent (2012 -[facility]).  3.  Residual LV to RA shunt (1.4:1 shunt on cath 2007, very small shunt noted on 2012 cardiac catheterization)  4.  Normal coronaries on cath 2007  5.  Mild aortic insufficiency and aortic root enlargement  6.  Likely very mild branch pulmonary artery stenosis  7.  Likely PFO (cath 2007)  8.  Mildly abnormal appearing tricuspid valve without significant tricuspid insufficiency or stenosis          Essential hypertension    - related to his congenital heart disease.   - continue lisinopril           Chest tightness or pressure    - during episode of sinus tachycardia, ECG without changes or ischemia   - troponin on admit < 0.006              VTE Risk Mitigation         Ordered     IP VTE LOW RISK PATIENT  Once      06/09/18 0602              Gaby Miles NP  Department of Hospital Medicine   [facility]  Spectra:   [phone]  Pager: [phone]

## 2018-06-10 NOTE — ASSESSMENT & PLAN NOTE
His diagnoses are as follows:  1.  TGA/VSD s/p arterial switch procedure and VSD closure (Dr. Raygoza)   2.  Coarctation stent (2012 -[facility]).  3.  Residual LV to RA shunt (1.4:1 shunt on cath 2007, very small shunt noted on 2012 cardiac catheterization)  4.  Normal coronaries on cath 2007  5.  Mild aortic insufficiency and aortic root enlargement  6.  Likely very mild branch pulmonary artery stenosis  7.  Likely PFO (cath 2007)  8.  Mildly abnormal appearing tricuspid valve without significant tricuspid insufficiency or stenosis

## 2018-06-10 NOTE — HOSPITAL COURSE
Admitted to hospital medicine for witnessed syncopal episode.  He revived on his own.   Congenital cards, and pediatric EP were consulted as he has h/o NSVT (5 beats on a holter), he also was getting chest tightness with sinus tachycardia, which he states he normally doesn't get.  He was seen by Dr. Arvind Acuña from pediatric EP who believes this was a vasovagal episode, however with his anatomy, should have a full EPS.  Dr Ley was contacted to perform procedure as Dr. Acuña will be out of town.  Dr. Ley stated his DDx of syncope includes vasovagal, bradycardia (especially given RBBB/LPFB), VT/VF (though recovered without CPR or electrical cardioversion). Also with hx of nonsustained atrial arrhythmias, by report, on prior monitoring.  S/p Echo and CTA results below.  EPS 6/12/18 normal, s/p ILR.  Recovered well o/n, no issues post procedure.     Dispo: f/u pediatric EP and Dr. Patrick Godfrey with congenital cardiology, no home needs, mom is an ED NP

## 2018-06-10 NOTE — SUBJECTIVE & OBJECTIVE
Interval History: Patient slept well, no c/o o/n.  No longer tachycardic and no longer having chest tightness. Denies SOB, CP, LH/ Dizziness    Review of Systems   Constitutional: Negative for activity change, appetite change, chills, fatigue and fever.   HENT: Negative for congestion, sore throat and trouble swallowing.    Respiratory: Positive for chest tightness (yesterday with sinus tach episode). Negative for cough, shortness of breath and wheezing.    Cardiovascular: Negative for chest pain, palpitations and leg swelling.   Gastrointestinal: Negative for abdominal pain, constipation, diarrhea, nausea and vomiting.   Genitourinary: Negative for decreased urine volume, difficulty urinating and hematuria.   Musculoskeletal: Negative for back pain and myalgias.   Skin: Negative for color change, rash and wound.   Neurological: Negative for dizziness, weakness, light-headedness, numbness and headaches.   Hematological: Does not bruise/bleed easily.   Psychiatric/Behavioral: Negative for agitation, decreased concentration and sleep disturbance. The patient is not nervous/anxious.      Objective:     Vital Signs (Most Recent):  Temp: 98.2 °F (36.8 °C) (06/10/18 0937)  Pulse: 94 (06/10/18 0937)  Resp: 18 (06/10/18 0937)  BP: 128/60 (06/10/18 0937)  SpO2: 99 % (06/10/18 0937) Vital Signs (24h Range):  Temp:  [96.3 °F (35.7 °C)-98.2 °F (36.8 °C)] 98.2 °F (36.8 °C)  Pulse:  [] 94  Resp:  [14-20] 18  SpO2:  [95 %-99 %] 99 %  BP: (109-135)/(55-68) 128/60     Weight: 57.4 kg (126 lb 8.7 oz)  Body mass index is 18.69 kg/m².    Intake/Output Summary (Last 24 hours) at 06/10/18 1139  Last data filed at 06/09/18 2200   Gross per 24 hour   Intake              720 ml   Output             1650 ml   Net             -930 ml      Physical Exam   Constitutional: He is oriented to person, place, and time. He appears well-developed and well-nourished. No distress.   HENT:   Head: Normocephalic and atraumatic.   Right Ear: External  ear normal.   Left Ear: External ear normal.   Nose: Nose normal.   Eyes: Conjunctivae are normal.   Neck: Normal range of motion. Neck supple. No JVD present.   Cardiovascular: Normal rate, regular rhythm, normal heart sounds and intact distal pulses.    No murmur heard.  Pulmonary/Chest: Effort normal and breath sounds normal. No respiratory distress. He has no wheezes. He has no rales.   Abdominal: Soft. Bowel sounds are normal. He exhibits no distension and no mass. There is no tenderness. There is no guarding.   Musculoskeletal: Normal range of motion. He exhibits no edema.   Neurological: He is alert and oriented to person, place, and time. No cranial nerve deficit or sensory deficit. Coordination normal.   Skin: Skin is warm and dry. No rash noted. He is not diaphoretic. No erythema.   Psychiatric: He has a normal mood and affect. His behavior is normal. Judgment and thought content normal.   Nursing note and vitals reviewed.      Significant Labs:   BMP:   Recent Labs  Lab 06/09/18 0617         K 3.7      CO2 25   BUN 12   CREATININE 0.8   CALCIUM 9.1   MG 2.4     CBC:   Recent Labs  Lab 06/09/18 0617   WBC 8.58   HGB 14.6   HCT 43.3        Coagulation:   Recent Labs  Lab 06/09/18 0617   INR 1.2       Significant Imaging: No acute findings in the chest.

## 2018-06-10 NOTE — HPI
Livia May is a 22 y.o. male who presents for follow-up of complex congenital heart disease who has been followed by Dr. Patrick Godfrey in the Adult Cardiology clinic here.  To summarize, his diagnoses are as follows:  1.  TGA/VSD s/p arterial switch procedure and VSD closure (Dr. Raygoza)   2.  Coarctation stent (2012 -[facility]).  3.  Residual LV to RA shunt (1.4:1 shunt on cath 2007, very small shunt noted on 2012 cardiac catheterization)  4.  Normal coronaries on cath 2007  5.  Mild aortic insufficiency and aortic root enlargement  6.  Likely very mild branch pulmonary artery stenosis  7.  Likely PFO (cath 2007)  8.  Mildly abnormal appearing tricuspid valve without significant tricuspid insufficiency or stenosis    He was doing well until 6/8 at around 6 pm when suddenly he became dizzy and his vision became blurry while trimming his nails standing up in the kitchen. He turned pale and diaphoretic, then passed out while his mother was there with him. His father caught him before he could hit the ground His parents sat him in a chair initially and then laid him on the floor when he did not recover quickly.  Mother was unable to feel a pulse at the time. CPR was not performed, patient's mother did perform a few pericardial thumps though. She activated EMS and he slowly gained consciousness prior to EMS arrival. By the time he arrived at the ED he was back to baseline.      In the Feb 2018 he had a holter which showed single run of short nonsustained SVT. He had a similar presentation at that time which he reported he had palpitations and chest heaviness. He has had no episodes in the interim until this presentation.     Per Livia he does not routinely have orthostatic dizziness.  He has never passed out in the past.  He has no fatigue or activity intolerance. He has no difficulty breathing.  He has no other recent chest pain.

## 2018-06-10 NOTE — PLAN OF CARE
Problem: Patient Care Overview  Goal: Plan of Care Review  Outcome: Revised  Plan of care discussed with patient. Patient is free of fall/trauma/injury. Denies CP, SOB, or pain/discomfort. Scheduled for possible CTA, and EP studies on Monday. All questions addressed. Will continue to monitor

## 2018-06-10 NOTE — ASSESSMENT & PLAN NOTE
- Will perform EPS and attempt to induce SVT/VT/VF, will also check for conduction system disease as patient with hx of TGA with switch and ECG that shows RBBB + LPFB  - Will need work-up to r/o any significant structural abnormalities / shunts prior to performing EPS, pending echo / CTA which need to be performed prior to EPS  - NPO past midnight  - Will consent for RFA for SVT, PPM for conduction disease, ICD for inducible VT/VF and loop recorder    I discussed the nature of EP study and ablation, including possible transseptal puncture. We discused risks and benefits at length. Our discussion included, but was not limited to the risk of death, infection, bleeding, stroke, MI, cardiac perforation, embolism, cardiac tamponade, skin burns, and other organic injury including the possibility for need for surgery or pacemaker implantation.

## 2018-06-10 NOTE — ASSESSMENT & PLAN NOTE
1.  TGA/VSD s/p arterial switch procedure and VSD closure (Dr. Raygoza)   2.  Coarctation stent (2012 -[facility]).  3.  Residual LV to RA shunt (1.4:1 shunt on cath 2007, 1.3:1 noted on 2012 cardiac catheterization)  4.  Normal coronaries on cath 2007  5.  mild aortic insufficiency and aortic root enlargement  6.  Likely very mild branch pulmonary artery stenosis  7.  Likely PFO (cath 2007)  8.  Mildly abnormal appearing tricuspid valve without significant tricuspid insufficiency or stenosis  9.  Nonsustained SVT noted on holter 2/2018  10.  Syncope - likely vasovagal, but at risk for ventricular and supraventricular tachycardias along with bradycardia    Recommendations:  1.  Echo - will be done tomorrow morning.  I have reached out to our [facility] echo tech, and she will get him done in the echo lab.   2.  Cardiac CTA - specifically to evaluate the coronaries along with aortic root size and branch pulmonary arteries.  His baseline heart rate is 90.  We would like it lower.  I hope that IV beta blocker will be adequate as EP would like to avoid oral due to concerns about interfering with their procedure.  3.  EP procedure Monday or Tuesday afternoon.  Will likely have a loop recorder placed as well.    Discussion:  Common complications after arterial switch are branch PA stenoses (he has had mild narrowing), aortic root enlargement with aortic insufficiency (his has been mild), and ostial coronary stenoses (none noted on cath 2007).  He also has a small residual VSD.  I doubt that any of his residual structural issues are contributing, but we will repeat an echo tomorrow.  A CTA should show us the coronaries as well.  He is definitely at risk for arrhythmias, and we need to be aggressive in his work up.  However, I suspect that his symptoms were vasovagal.

## 2018-06-10 NOTE — SUBJECTIVE & OBJECTIVE
Interval History: Patient with brief chest pressure last night.  No other concerns.    Objective:     Vital Signs (Most Recent):  Temp: 98.2 °F (36.8 °C) (06/10/18 0937)  Pulse: 94 (06/10/18 0937)  Resp: 18 (06/10/18 0937)  BP: 128/60 (06/10/18 0937)  SpO2: 99 % (06/10/18 0937) Vital Signs (24h Range):  Temp:  [96.3 °F (35.7 °C)-98.2 °F (36.8 °C)] 98.2 °F (36.8 °C)  Pulse:  [] 94  Resp:  [14-20] 18  SpO2:  [95 %-99 %] 99 %  BP: (109-135)/(55-68) 128/60     Weight: 57.4 kg (126 lb 8.7 oz)  Body mass index is 18.69 kg/m².     SpO2: 99 %  O2 Device (Oxygen Therapy): room air    Intake/Output - Last 3 Shifts       06/08 0700 - 06/09 0659 06/09 0700 - 06/10 0659 06/10 0700 - 06/11 0659    P.O. 0 720     Total Intake(mL/kg) 0 (0) 720 (12.5)     Urine (mL/kg/hr)  1650 (1.2)     Total Output   1650      Net 0 -930             Urine Occurrence 1 x      Stool Occurrence  0 x           Lines/Drains/Airways          No matching active lines, drains, or airways        Scheduled Medications:    lisinopril  5 mg Oral Daily     Continuous Medications:     PRN Medications: acetaminophen, dextrose 50%, dextrose 50%, glucagon (human recombinant), glucose, glucose, ondansetron, sodium chloride 0.9%    Physical Exam   In general, he is a very healthy-appearing, thin, nondysmorphic male in no apparent distress.  The eyes, nares, and oropharynx are clear.  Eyelids and conjunctiva are normal without drainage or erythema.  Pupils equal and round bilaterally.  The head is normocephalic and atraumatic.  The neck is supple without jugular venous distention or thyroid enlargement.  The lungs are clear to auscultation bilaterally.  There is a well healed sternotomy.  The first heart sound is normal.  The second is fixed and split.  There is a 2/6 harsh early systolic murmur at the LLSB and apex.  The abdominal exam is benign without hepatosplenomegaly, tenderness, or distention.  Pulses are normal in all 4 extremities with brisk capillary  refill and no clubbing, cyanosis, or edema.  No rashes are noted.    Significant Labs: Labs reviewed.  No significant abnormalities.    Significant Imaging: as below   Chest X-ray 6/9/18 looks good    Old data:  Holter 2/2018 normal except:  One 5 beat run of SVT with max rate 193 bpm    Echo August 2017:  Imaging consistent with diagnosis of d-TGA s/p arterial switch & coarctation s/p stent:   There is prolapse of the tricuspid valve leaflets into the right atrium with reasonable coaptation and mild tricuspid regurgitation at velocity suggesting right ventricular pressure 36 mmHg plus right atrial pressure.  Right ventricle appears normal in size with qualitatively good function.  Color Doppler suggests at least a mild degree of pulmonary insufficiency.  Main and pulmonary branches are not well demonstrated in this study with the anatomy confounded by Rhonda maneuver for arterial switch.  Left ventricle appears normal in size and structure.  Paradoxical septal motion with good movement of the left ventricular free wall,  SF=30 % and EF estimated  55 -60% from apical four chamber views   Diastolic function is normal.   Images suggest but not diagnostic for trileaflet aortic valve with trivial to mild aortic regurgitation.  The ascending aorta appears dilated but is not clearly demonstrated.  Echodensity consistent with stent in descending aorta at site of coarctation with peak velocity <2.3 m/s and no diastolic runoff    CTA 1/20/2014:  1.  In this patient status post arterial switch for transposition of the great arteries, there is mild dilatation of the aortic root measuring 4.0 cm (previously 3.8 cm).  2.  F/U coarctation; a 2.9 cm long descending aortic stent with stable luminal diameter measuring 1.4 cm and stable post-stent aortic diameter measuring 1.7 cm.  3. 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    Last cath 2012:  coarctation stented, left  common femoral artery balloon dilated.  LV to RA shunt Qp:Qs 1.3:1.  14-16 mmHg gradient into the branch pulmonary arteries with RV pressure about 36mmHg, and wedge pressure 11.  21mmHg gradient across coarctation.    Cath 2007:  normal appearing coronaries with right dominant system

## 2018-06-10 NOTE — NURSING
Patient had short 10-15 minute episode of chest pain with sinus tachycardia 100-120 HR. NP notified. Pain resolved on its own.

## 2018-06-10 NOTE — CONSULTS
[facility]  Cardiac Electrophysiology  Consult Note    Admission Date: 6/9/2018  Code Status: Full Code   Attending Provider: Padma Jimenez MD  Consulting Provider: Abigail Cantu MD  Principal Problem:Syncope    Inpatient consult to Electrophysiology  Consult performed by: ABIGAIL CANTU  Consult ordered by: DRAKE VELAZQUEZ  Reason for consult: Syncope        Subjective:     Chief Complaint:  Syncope     HPI:   Livia May is a 22 y.o. male who presents for follow-up of complex congenital heart disease who has been followed by Dr. Patrick Godfrey in the Adult Cardiology clinic here.  To summarize, his diagnoses are as follows:  1.  TGA[facility]VSD [facility]   2.  Coarctation stent (2012 -[facility]).  3.  Residual LV to RA shunt (1.4:1 shunt on cath 2007, very small shunt noted on 2012 cardiac catheterization)  4.  Normal coronaries on cath 2007  5.  Mild aortic insufficiency and aortic root enlargement  6.  Likely very mild branch pulmonary artery stenosis  7.  Likely PFO (cath 2007)  8.  Mildly abnormal appearing tricuspid valve without significant tricuspid insufficiency or stenosis    He was doing well until 6/8 at around 6 pm when suddenly he became dizzy and his vision became blurry while trimming his nails standing up in the kitchen. He turned pale and diaphoretic, then passed out while his mother was there with him. His father caught him before he could hit the ground His parents sat him in a chair initially and then laid him on the floor when he did not recover quickly.  Mother was unable to feel a pulse at the time. CPR was not performed, patient's mother did perform a few pericardial thumps though. She activated EMS and he slowly gained consciousness prior to EMS arrival. By the time he arrived at the ED he was back to baseline.      In the Feb 2018 he had a holter which showed single run of short nonsustained SVT. He had a  similar presentation at that time which he reported he had palpitations and chest heaviness. He has had no episodes in the interim until this presentation.     Per [place] he does not routinely have orthostatic dizziness.  He has never passed out in the past.  He has no fatigue or activity intolerance. He has no difficulty breathing.  He has no other recent chest pain.    Past Medical History:   Diagnosis Date    Coarctation of aorta, congenital     Diaphragmatic paralysis     Strabismus     TGA/VSD (transposition of great arteries, ventricular septal defect)        Past Surgical History:   Procedure Laterality Date    ARTERIAL SWITCH W/ VENTRICULAR SEPTAL DEFECT CLOSURE      CARDIAC CATHETERIZATION  10/11/07    CARDIAC CATHETERIZATION  1/17/12    coarctation of aorta repair/with TGA switch      DIAPHRAGM PLICATION      STRABISMUS SURGERY         Review of patient's allergies indicates:  No Known Allergies    No current facility-administered medications on file prior to encounter.      Current Outpatient Prescriptions on File Prior to Encounter   Medication Sig    lisinopril (PRINIVIL,ZESTRIL) 5 MG tablet TAKE 1 TABLET BY MOUTH DAILY **May Cause Dizziness**    lisinopril (PRINIVIL,ZESTRIL) 5 MG tablet TAKE 1 TABLET BY MOUTH DAILY **May Cause Dizziness**     Family History     Problem Relation (Age of Onset)    Asthma Brother        Social History Main Topics    Smoking status: Never Smoker    Smokeless tobacco: Never Used    Alcohol use No    Drug use: No    Sexual activity: Not on file     Review of Systems   Constitution: Negative for chills and fever.   HENT: Negative for hoarse voice and sore throat.    Eyes: Positive for blurred vision.   Cardiovascular: Positive for palpitations. Negative for chest pain, claudication, cyanosis, dyspnea on exertion, irregular heartbeat, leg swelling, near-syncope, orthopnea, paroxysmal nocturnal dyspnea and syncope.   Respiratory: Negative for cough, hemoptysis  and shortness of breath.    Musculoskeletal: Negative for back pain, joint pain and joint swelling.   Gastrointestinal: Negative for abdominal pain, constipation, diarrhea, hematochezia, melena, nausea and vomiting.   Genitourinary: Negative for dysuria, hematuria and incomplete emptying.   Neurological: Negative for dizziness, headaches and light-headedness.   Psychiatric/Behavioral: Negative for altered mental status, depression and suicidal ideas. The patient is not nervous/anxious.      Objective:     Vital Signs (Most Recent):  Temp: 98.2 °F (36.8 °C) (06/10/18 0937)  Pulse: 94 (06/10/18 0937)  Resp: 18 (06/10/18 0937)  BP: 128/60 (06/10/18 0937)  SpO2: 99 % (06/10/18 0937) Vital Signs (24h Range):  Temp:  [96.3 °F (35.7 °C)-98.2 °F (36.8 °C)] 98.2 °F (36.8 °C)  Pulse:  [] 94  Resp:  [14-20] 18  SpO2:  [95 %-99 %] 99 %  BP: (109-135)/(55-68) 128/60     Weight: 57.4 kg (126 lb 8.7 oz)  Body mass index is 18.69 kg/m².    SpO2: 99 %  O2 Device (Oxygen Therapy): room air    Physical Exam   Constitutional: He is oriented to person, place, and time. He appears well-developed and well-nourished. No distress.   HENT:   Head: Normocephalic and atraumatic.   Mouth/Throat: Oropharynx is clear and moist. No oropharyngeal exudate.   Eyes: Conjunctivae and EOM are normal. Pupils are equal, round, and reactive to light. Right eye exhibits no discharge. Left eye exhibits no discharge. No scleral icterus.   Neck: Normal range of motion. Neck supple. No JVD present. No tracheal deviation present. No thyromegaly present.   Cardiovascular: Normal rate, regular rhythm and intact distal pulses.  Exam reveals no gallop and no friction rub.    Murmur (2/6 systolic murmur in LUSB) heard.  Pulmonary/Chest: Effort normal and breath sounds normal. No respiratory distress. He has no wheezes. He has no rales.   Abdominal: Soft. Bowel sounds are normal. He exhibits no distension and no mass. There is no tenderness. There is no rebound  and no guarding.   Musculoskeletal: Normal range of motion.   Lymphadenopathy:     He has no cervical adenopathy.   Neurological: He is alert and oriented to person, place, and time. No cranial nerve deficit.   Skin: Skin is warm and dry. He is not diaphoretic.   Psychiatric: He has a normal mood and affect. His behavior is normal. Judgment and thought content normal.   Nursing note and vitals reviewed.      Significant Labs:   EP:   Recent Labs  Lab 06/09/18  0617      K 3.7      CO2 25      BUN 12   CREATININE 0.8   CALCIUM 9.1   PROT 7.0   ALBUMIN 4.1   BILITOT 2.4*   ALKPHOS 63   AST 14   ALT 16   ANIONGAP 7*   ESTGFRAFRICA >60.0   EGFRNONAA >60.0   WBC 8.58   HGB 14.6   HCT 43.3      INR 1.2       Significant Imaging: EKG: NSR, RBBB, LPFB    Assessment and Plan:     * Syncope    - Will perform EPS and attempt to induce SVT/VT/VF, will also check for conduction system disease as patient with hx of TGA with switch and ECG that shows RBBB + LPFB  - Will need work-up to r/o any significant structural abnormalities / shunts prior to performing EPS, pending echo / CTA which need to be performed prior to EPS  - NPO past midnight  - Will consent for RFA for SVT, PPM for conduction disease, ICD for inducible VT/VF and loop recorder    I discussed the nature of EP study and ablation, including possible transseptal puncture. We discused risks and benefits at length. Our discussion included, but was not limited to the risk of death, infection, bleeding, stroke, MI, cardiac perforation, embolism, cardiac tamponade, skin burns, and other organic injury including the possibility for need for surgery or pacemaker implantation.                Thank you for your consult. I will follow-up with patient. Please contact us if you have any additional questions.    Ortiz Recinos MD  Cardiac Electrophysiology  [facility]

## 2018-06-10 NOTE — HPI
Livia May is a 22 y.o. male who presents for follow-up of complex congenital heart disease who has been followed by Dr. Patrick Godfrey in the Adult Cardiology clinic here.  To summarize, his diagnoses are as follows:  1.  TGA/VSD s/p arterial switch procedure and VSD closure (Dr. Raygoza)   2.  Coarctation stent (2012 -[facility]).  3.  Residual LV to RA shunt (1.4:1 shunt on cath 2007, very small shunt noted on 2012 cardiac catheterization)  4.  Normal coronaries on cath 2007  5.  Mild aortic insufficiency and aortic root enlargement  6.  Likely very mild branch pulmonary artery stenosis  7.  Likely PFO (cath 2007)  8.  Mildly abnormal appearing tricuspid valve without significant tricuspid insufficiency or stenosis     HPI:  He was doing well until earlier yesterday around 6 pm when  suddenly he became dizzy and his vision became blurry while trimming his nails standing up in the kitchen. He turned pale and diaphoretic, then passed out while his mother was there with him. Mother caught him and lay him to the floor to prevent the fall.  She was unable to feel a pulse at the time.  In the past he has had a holter which showed SVT.  She activated EMS and he slowly gained consciousness prior to EMS arrival. By the time he arrived at the ED he was back to baseline.  The pediatric Cardiologist there felt he needed to come to [facility], so cardiology was contacted and is willing to assist in his care. He has annual clinic visit coming up with Dr. Godfrey in august.      VS: /77, P 88, RR 13, %, T98.0F     LABS: BMP - NML, CBC - NML, TROP - NEGATIVE, BNP - NML     RADIOGRAPHS:  CXR: no acute findings     EKG: RBBB with no changes from old, and no ischemic changes     Currently he is asymptomatic. Conversant, AAO X 4. Mother at bedside reports he looks back to his normal self.

## 2018-06-10 NOTE — PROGRESS NOTES
[facility]  Pediatric Cardiology  Progress Note    Patient Name: Livia May  MRN: [id number]  Admission Date: 6/9/2018  Hospital Length of Stay: 1 days  Code Status: Full Code   Attending Physician: Padma Jimenez MD   Primary Care Physician: Isaías Lambert MD  Expected Discharge Date: 6/11/2018  Principal Problem:Syncope    Subjective:     Interval History: Patient with brief chest pressure last night.  No other concerns.    Objective:     Vital Signs (Most Recent):  Temp: 98.2 °F (36.8 °C) (06/10/18 0937)  Pulse: 94 (06/10/18 0937)  Resp: 18 (06/10/18 0937)  BP: 128/60 (06/10/18 0937)  SpO2: 99 % (06/10/18 0937) Vital Signs (24h Range):  Temp:  [96.3 °F (35.7 °C)-98.2 °F (36.8 °C)] 98.2 °F (36.8 °C)  Pulse:  [] 94  Resp:  [14-20] 18  SpO2:  [95 %-99 %] 99 %  BP: (109-135)/(55-68) 128/60     Weight: 57.4 kg (126 lb 8.7 oz)  Body mass index is 18.69 kg/m².     SpO2: 99 %  O2 Device (Oxygen Therapy): room air    Intake/Output - Last 3 Shifts       06/08 0700 - 06/09 0659 06/09 0700 - 06/10 0659 06/10 0700 - 06/11 0659    P.O. 0 720     Total Intake(mL/kg) 0 (0) 720 (12.5)     Urine (mL/kg/hr)  1650 (1.2)     Total Output   1650      Net 0 -930             Urine Occurrence 1 x      Stool Occurrence  0 x           Lines/Drains/Airways          No matching active lines, drains, or airways        Scheduled Medications:    lisinopril  5 mg Oral Daily     Continuous Medications:     PRN Medications: acetaminophen, dextrose 50%, dextrose 50%, glucagon (human recombinant), glucose, glucose, ondansetron, sodium chloride 0.9%    Physical Exam   In general, he is a very healthy-appearing, thin, nondysmorphic male in no apparent distress.  The eyes, nares, and oropharynx are clear.  Eyelids and conjunctiva are normal without drainage or erythema.  Pupils equal and round bilaterally.  The head is normocephalic and atraumatic.  The neck is supple without jugular venous distention or thyroid enlargement.   The lungs are clear to auscultation bilaterally.  There is a well healed sternotomy.  The first heart sound is normal.  The second is fixed and split.  There is a 2/6 harsh early systolic murmur at the LLSB and apex.  The abdominal exam is benign without hepatosplenomegaly, tenderness, or distention.  Pulses are normal in all 4 extremities with brisk capillary refill and no clubbing, cyanosis, or edema.  No rashes are noted.    Significant Labs: Labs reviewed.  No significant abnormalities.    Significant Imaging: as below   Chest X-ray 6/9/18 looks good    Old data:  Holter 2/2018 normal except:  One 5 beat run of SVT with max rate 193 bpm    Echo August 2017:  Imaging consistent with diagnosis of d-TGA s/p arterial switch & coarctation s/p stent:   There is prolapse of the tricuspid valve leaflets into the right atrium with reasonable coaptation and mild tricuspid regurgitation at velocity suggesting right ventricular pressure 36 mmHg plus right atrial pressure.  Right ventricle appears normal in size with qualitatively good function.  Color Doppler suggests at least a mild degree of pulmonary insufficiency.  Main and pulmonary branches are not well demonstrated in this study with the anatomy confounded by Rhonda maneuver for arterial switch.  Left ventricle appears normal in size and structure.  Paradoxical septal motion with good movement of the left ventricular free wall,  SF=30 % and EF estimated  55 -60% from apical four chamber views   Diastolic function is normal.   Images suggest but not diagnostic for trileaflet aortic valve with trivial to mild aortic regurgitation.  The ascending aorta appears dilated but is not clearly demonstrated.  Echodensity consistent with stent in descending aorta at site of coarctation with peak velocity <2.3 m/s and no diastolic runoff    CTA 1/20/2014:  1.  In this patient status post arterial switch for transposition of the great arteries, there is mild dilatation of the  aortic root measuring 4.0 cm (previously 3.8 cm).  2.  F/U coarctation; a 2.9 cm long descending aortic stent with stable luminal diameter measuring 1.4 cm and stable post-stent aortic diameter measuring 1.7 cm.  3. 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    Last cath 2012:  coarctation stented, left common femoral artery balloon dilated.  LV to RA shunt Qp:Qs 1.3:1.  14-16 mmHg gradient into the branch pulmonary arteries with RV pressure about 36mmHg, and wedge pressure 11.  21mmHg gradient across coarctation.    Cath 2007:  normal appearing coronaries with right dominant system      Assessment and Plan:     Cardiac/Vascular   Adult congenital heart disease    1.  TGA/VSD s/p arterial switch procedure and VSD closure (Dr. Raygoza)   2.  Coarctation stent (2012 -[facility]).  3.  Residual LV to RA shunt (1.4:1 shunt on cath 2007, 1.3:1 noted on 2012 cardiac catheterization)  4.  Normal coronaries on cath 2007  5.  mild aortic insufficiency and aortic root enlargement  6.  Likely very mild branch pulmonary artery stenosis  7.  Likely PFO (cath 2007)  8.  Mildly abnormal appearing tricuspid valve without significant tricuspid insufficiency or stenosis  9.  Nonsustained SVT noted on holter 2/2018  10.  Syncope - likely vasovagal, but at risk for ventricular and supraventricular tachycardias along with bradycardia    Recommendations:  1.  Echo - will be done tomorrow morning.  I have reached out to our [facility] echo tech, and she will get him done in the echo lab.   2.  Cardiac CTA - specifically to evaluate the coronaries along with aortic root size and branch pulmonary arteries.  His baseline heart rate is 90.  We would like it lower.  I hope that IV beta blocker will be adequate as EP would like to avoid oral due to concerns about interfering with their procedure.  3.  EP procedure Monday or Tuesday afternoon.  Will likely have a loop recorder placed as  well.    Discussion:  Common complications after arterial switch are branch PA stenoses (he has had mild narrowing), aortic root enlargement with aortic insufficiency (his has been mild), and ostial coronary stenoses (none noted on cath 2007).  He also has a small residual VSD.  I doubt that any of his residual structural issues are contributing, but we will repeat an echo tomorrow.  A CTA should show us the coronaries as well.  He is definitely at risk for arrhythmias, and we need to be aggressive in his work up.  However, I suspect that his symptoms were vasovagal.            Patrick Godfrey MD  Pediatric Cardiology  [facility]

## 2018-06-11 ENCOUNTER — ANESTHESIA EVENT (OUTPATIENT)
Dept: MEDSURG UNIT | Facility: HOSPITAL | Age: 23
DRG: 273 | End: 2018-06-11
Payer: COMMERCIAL

## 2018-06-11 ENCOUNTER — ANESTHESIA (OUTPATIENT)
Dept: MEDSURG UNIT | Facility: HOSPITAL | Age: 23
DRG: 273 | End: 2018-06-11
Payer: COMMERCIAL

## 2018-06-11 LAB
ANION GAP SERPL CALC-SCNC: 8 MMOL/L
AORTIC VALVE REGURGITATION: NORMAL
APTT BLDCRRT: 26.5 SEC
BASOPHILS # BLD AUTO: 0.04 K/UL
BASOPHILS NFR BLD: 0.5 %
BUN SERPL-MCNC: 11 MG/DL
CALCIUM SERPL-MCNC: 9.6 MG/DL
CHLORIDE SERPL-SCNC: 103 MMOL/L
CO2 SERPL-SCNC: 27 MMOL/L
CREAT SERPL-MCNC: 0.9 MG/DL
DIASTOLIC DYSFUNCTION: NO
DIFFERENTIAL METHOD: ABNORMAL
EOSINOPHIL # BLD AUTO: 0.1 K/UL
EOSINOPHIL NFR BLD: 1.6 %
ERYTHROCYTE [DISTWIDTH] IN BLOOD BY AUTOMATED COUNT: 12.5 %
EST. GFR  (AFRICAN AMERICAN): >60 ML/MIN/1.73 M^2
EST. GFR  (NON AFRICAN AMERICAN): >60 ML/MIN/1.73 M^2
ESTIMATED PA SYSTOLIC PRESSURE: 25.28
GLUCOSE SERPL-MCNC: 100 MG/DL
HCT VFR BLD AUTO: 44.7 %
HGB BLD-MCNC: 15.1 G/DL
IMM GRANULOCYTES # BLD AUTO: 0.03 K/UL
IMM GRANULOCYTES NFR BLD AUTO: 0.4 %
INR PPP: 1.1
LYMPHOCYTES # BLD AUTO: 2.4 K/UL
LYMPHOCYTES NFR BLD: 32 %
MCH RBC QN AUTO: 32.1 PG
MCHC RBC AUTO-ENTMCNC: 33.8 G/DL
MCV RBC AUTO: 95 FL
MONOCYTES # BLD AUTO: 0.6 K/UL
MONOCYTES NFR BLD: 8.3 %
NEUTROPHILS # BLD AUTO: 4.2 K/UL
NEUTROPHILS NFR BLD: 57.2 %
NRBC BLD-RTO: 0 /100 WBC
PLATELET # BLD AUTO: 171 K/UL
PMV BLD AUTO: 10.7 FL
POTASSIUM SERPL-SCNC: 4 MMOL/L
PROTHROMBIN TIME: 11 SEC
RBC # BLD AUTO: 4.71 M/UL
RETIRED EF AND QEF - SEE NOTES: 55 (ref 55–65)
SODIUM SERPL-SCNC: 138 MMOL/L
TRICUSPID VALVE REGURGITATION: NORMAL
WBC # BLD AUTO: 7.43 K/UL

## 2018-06-11 PROCEDURE — 25500020 PHARM REV CODE 255: Performed by: HOSPITALIST

## 2018-06-11 PROCEDURE — 93306 TTE W/DOPPLER COMPLETE: CPT | Mod: 26,,, | Performed by: INTERNAL MEDICINE

## 2018-06-11 PROCEDURE — 85730 THROMBOPLASTIN TIME PARTIAL: CPT

## 2018-06-11 PROCEDURE — 80048 BASIC METABOLIC PNL TOTAL CA: CPT

## 2018-06-11 PROCEDURE — 36415 COLL VENOUS BLD VENIPUNCTURE: CPT

## 2018-06-11 PROCEDURE — 96374 THER/PROPH/DIAG INJ IV PUSH: CPT

## 2018-06-11 PROCEDURE — 25000003 PHARM REV CODE 250: Performed by: NURSE PRACTITIONER

## 2018-06-11 PROCEDURE — 63600175 PHARM REV CODE 636 W HCPCS

## 2018-06-11 PROCEDURE — 99232 SBSQ HOSP IP/OBS MODERATE 35: CPT | Mod: ,,, | Performed by: INTERNAL MEDICINE

## 2018-06-11 PROCEDURE — 85025 COMPLETE CBC W/AUTO DIFF WBC: CPT

## 2018-06-11 PROCEDURE — 20600001 HC STEP DOWN PRIVATE ROOM

## 2018-06-11 PROCEDURE — 85610 PROTHROMBIN TIME: CPT

## 2018-06-11 PROCEDURE — 99232 SBSQ HOSP IP/OBS MODERATE 35: CPT | Mod: ,,, | Performed by: NURSE PRACTITIONER

## 2018-06-11 RX ORDER — CEFAZOLIN SODIUM 1 G/3ML
2 INJECTION, POWDER, FOR SOLUTION INTRAMUSCULAR; INTRAVENOUS
Status: COMPLETED | OUTPATIENT
Start: 2018-06-11 | End: 2018-06-12

## 2018-06-11 RX ORDER — METOPROLOL TARTRATE 1 MG/ML
5 INJECTION, SOLUTION INTRAVENOUS EVERY 5 MIN PRN
Status: DISCONTINUED | OUTPATIENT
Start: 2018-06-11 | End: 2018-06-13 | Stop reason: HOSPADM

## 2018-06-11 RX ORDER — NITROGLYCERIN 0.4 MG/1
TABLET SUBLINGUAL
Status: COMPLETED
Start: 2018-06-11 | End: 2018-06-11

## 2018-06-11 RX ORDER — METOPROLOL TARTRATE 50 MG/1
50 TABLET ORAL ONCE
Status: COMPLETED | OUTPATIENT
Start: 2018-06-11 | End: 2018-06-11

## 2018-06-11 RX ADMIN — IOHEXOL 100 ML: 350 INJECTION, SOLUTION INTRAVENOUS at 03:06

## 2018-06-11 RX ADMIN — METOPROLOL TARTRATE 50 MG: 50 TABLET ORAL at 10:06

## 2018-06-11 RX ADMIN — NITROGLYCERIN: 0.4 TABLET SUBLINGUAL at 03:06

## 2018-06-11 NOTE — ASSESSMENT & PLAN NOTE
- Will perform EPS and attempt to induce SVT/VT/VF, will also check for conduction system disease as patient with hx of TGA with switch and ECG that shows RBBB + LPFB  - work-up to r/o any significant structural abnormalities / shunts negative  - Will consent for RFA for SVT, PPM for conduction disease, ICD for inducible VT/VF    We discussed the nature of EP study and ablation, including possible transseptal puncture. We discused risks and benefits at length. Our discussion included, but was not limited to the risk of death, infection, bleeding, stroke, MI, cardiac perforation, embolism, cardiac tamponade, skin burns, and other organic injury including the possibility for need for surgery or pacemaker implantation.    Consent obtained

## 2018-06-11 NOTE — PROGRESS NOTES
[facility] Hospital Medicine  Progress Note    Patient Name: Livia May  MRN: [id number]  Patient Class: IP- Inpatient   Admission Date: 6/9/2018  Length of Stay: 1 days  Attending Physician: Padma Jimenez MD  Primary Care Provider: Isaías Lambert MD    [facility] Medicine Team: [facility] ANGELINA Miles NP    Subjective:     Principal Problem:Syncope    HPI:  Livia May is a 22 y.o. male who presents for follow-up of complex congenital heart disease who has been followed by Dr. Patrick Godfrey in the Adult Cardiology clinic here.  To summarize, his diagnoses are as follows:  1.  TGA/VSD s/[facility]   2.  Coarctation stent (2012 -[facility]).  3.  Residual LV to RA shunt (1.4:1 shunt on cath 2007, very small shunt noted on 2012 cardiac catheterization)  4.  Normal coronaries on cath 2007  5.  Mild aortic insufficiency and aortic root enlargement  6.  Likely very mild branch pulmonary artery stenosis  7.  Likely PFO (cath 2007)  8.  Mildly abnormal appearing tricuspid valve without significant tricuspid insufficiency or stenosis     HPI:  He was doing well until earlier yesterday around 6 pm when  suddenly he became dizzy and his vision became blurry while trimming his nails standing up in the kitchen. He turned pale and diaphoretic, then passed out while his mother was there with him. Mother caught him and lay him to the floor to prevent the fall.  She was unable to feel a pulse at the time.  In the past he has had a holter which showed SVT.  She activated EMS and he slowly gained consciousness prior to EMS arrival. By the time he arrived at the ED he was back to baseline.  The pediatric Cardiologist there felt he needed to come to [facility], so cardiology was contacted and is willing to assist in his care. He has annual clinic visit coming up with Dr. Godfrey in august.      VS: /77, P 88, RR 13, %, T98.0F     LABS: BMP - NML,  CBC - NML, TROP - NEGATIVE, BNP - NML     RADIOGRAPHS:  CXR: no acute findings     EKG: RBBB with no changes from old, and no ischemic changes     Currently he is asymptomatic. Conversant, AAO X 4. Mother at bedside reports he looks back to his normal self.        Hospital Course:  Admitted to hospital medicine for witnessed syncopal episode.  He revived on his own.   Congenital cards, and pediatric EP were consulted as he has h/o NSVT (5 beats on a holter), he also was getting chest tightness with sinus tachycardia, which he states he normally doesn't get.  He was seen by Dr. Arvind Acuña from pediatric EP who believes this was a vasovagal episode, however with his anatomy, should have a full EPS.  Dr Ley was contacted to perform procedure as Dr. Acuña will be out of town.  Dr. Ley stated his DDx of syncope includes vasovagal, bradycardia (especially given RBBB/LPFB), VT/VF (though recovered without CPR or electrical cardioversion). Also with hx of nonsustained atrial arrhythmias, by report, on prior monitoring.  He agrees with Echo and CTA that are already planned by primary service.  Following those studies, plan is for EPS 6/12/18, with possible ablation, possible PPM vs ICD vs ILR.    Dispo: f/u pediatric EP and Dr. Patrick Godfrey with congenital cardiology, no home needs, mom is an ED NP      Interval History: Patient slept well, no c/o o/n.  No longer tachycardic and no longer having chest tightness. Denies SOB, CP, LH/ Dizziness    Review of Systems   Constitutional: Negative for activity change, appetite change, chills, fatigue and fever.   HENT: Negative for congestion, sore throat and trouble swallowing.    Respiratory: Positive for chest tightness (during sinus tach episode, now since resolved ). Negative for cough, shortness of breath and wheezing.    Cardiovascular: Negative for chest pain, palpitations and leg swelling.   Gastrointestinal: Negative for abdominal pain, constipation, diarrhea,  nausea and vomiting.   Genitourinary: Negative for decreased urine volume, difficulty urinating and hematuria.   Musculoskeletal: Negative for back pain and myalgias.   Skin: Negative for color change, rash and wound.   Neurological: Negative for dizziness, weakness, light-headedness, numbness and headaches.   Hematological: Does not bruise/bleed easily.   Psychiatric/Behavioral: Negative for agitation, decreased concentration and sleep disturbance. The patient is not nervous/anxious.      Objective:     Vital Signs (Most Recent):  Temp: 98 °F (36.7 °C) (06/11/18 1329)  Pulse: 75 (06/11/18 1329)  Resp: 16 (06/11/18 1329)  BP: 116/61 (06/11/18 1329)  SpO2: 96 % (06/11/18 1329) Vital Signs (24h Range):  Temp:  [96 °F (35.6 °C)-98.4 °F (36.9 °C)] 98 °F (36.7 °C)  Pulse:  [70-97] 75  Resp:  [14-19] 16  SpO2:  [96 %-99 %] 96 %  BP: (111-125)/(56-62) 116/61     Weight: 57.3 kg (126 lb 5.2 oz)  Body mass index is 18.65 kg/m².    Intake/Output Summary (Last 24 hours) at 06/11/18 1515  Last data filed at 06/11/18 0600   Gross per 24 hour   Intake              720 ml   Output             1125 ml   Net             -405 ml      Physical Exam   Constitutional: He is oriented to person, place, and time. He appears well-developed and well-nourished. No distress.   HENT:   Head: Normocephalic and atraumatic.   Right Ear: External ear normal.   Left Ear: External ear normal.   Nose: Nose normal.   Eyes: Conjunctivae and EOM are normal.   Neck: Normal range of motion. Neck supple. No JVD present.   Cardiovascular: Normal rate, regular rhythm and intact distal pulses.    Murmur (combined high pitched blowing and low pitched rumbling ) heard.  Pulmonary/Chest: Effort normal and breath sounds normal. No respiratory distress. He has no wheezes. He has no rales.   Abdominal: Soft. Bowel sounds are normal. He exhibits no distension and no mass. There is no tenderness. There is no guarding.   Musculoskeletal: Normal range of motion. He  exhibits no edema.   Neurological: He is alert and oriented to person, place, and time. No cranial nerve deficit or sensory deficit. Coordination normal.   Skin: Skin is warm and dry. No rash noted. He is not diaphoretic. No erythema.   Psychiatric: He has a normal mood and affect. His behavior is normal. Judgment and thought content normal.   Nursing note and vitals reviewed.      Significant Labs:   BMP:     Recent Labs  Lab 06/11/18  0459         K 4.0      CO2 27   BUN 11   CREATININE 0.9   CALCIUM 9.6     CBC:     Recent Labs  Lab 06/11/18  0500   WBC 7.43   HGB 15.1   HCT 44.7        Coagulation:     Recent Labs  Lab 06/11/18  0459   INR 1.1   APTT 26.5       Significant Imaging: No acute findings in the chest.    Assessment/Plan:      * Syncope    - Pediatric EP and adult EP have both seen and collaborating on patient care  - CTA to look at coronary anatomy 6/11/18  - EPS to follow with possible PPM, ablation, ICD vs ILR.           Adult congenital heart disease    His diagnoses are as follows:  1.  TGA/VSD s/p arterial switch procedure and VSD closure (Dr. [facility])   2.  Coarctation stent (2012 -[facility]).  3.  Residual LV to RA shunt (1.4:1 shunt on cath 2007, very small shunt noted on 2012 cardiac catheterization)  4.  Normal coronaries on cath 2007  5.  Mild aortic insufficiency and aortic root enlargement  6.  Likely very mild branch pulmonary artery stenosis  7.  Likely PFO (cath 2007)  8.  Mildly abnormal appearing tricuspid valve without significant tricuspid insufficiency or stenosis          Essential hypertension    - related to his congenital heart disease.   - continue lisinopril           Chest tightness or pressure    - during episode of sinus tachycardia, ECG without changes or ischemia   - troponin on admit < 0.006  - Normal coronaries [facility] 2007, going for cardiac CTA to look at coronary anatomy           Transposition of great arteries, corrected    - followed by  Dr. Patrick Godfrey Adult congenital            VTE Risk Mitigation         Ordered     IP VTE LOW RISK PATIENT  Once      06/09/18 0602              Gaby Miles NP  Department of Hospital Medicine   [facility]  Spectra:  [phone]  Pager: [phone]

## 2018-06-11 NOTE — ASSESSMENT & PLAN NOTE
- during episode of sinus tachycardia, ECG without changes or ischemia   - troponin on admit < 0.006  - Normal coronaries [facility] 2007, going for cardiac CTA to look at coronary anatomy

## 2018-06-11 NOTE — ASSESSMENT & PLAN NOTE
- Pediatric EP and adult EP have both seen and collaborating on patient care  - CTA to look at coronary anatomy 6/11/18  - EPS to follow with possible PPM, ablation, ICD vs ILR.

## 2018-06-11 NOTE — PROGRESS NOTES
[facility]  Cardiac Electrophysiology  Progress Note    Admission Date: 6/9/2018  Code Status: Full Code   Attending Physician: Padma Jimenez MD   Expected Discharge Date: 6/11/2018  Principal Problem:Syncope    Subjective:     Interval History: NAEON    Review of Systems   Constitution: Negative. Negative for chills and fever.   HENT: Negative.  Negative for hoarse voice and sore throat.    Eyes: Negative.    Cardiovascular: Negative.  Negative for chest pain, claudication, cyanosis, dyspnea on exertion, irregular heartbeat, leg swelling, near-syncope, orthopnea, paroxysmal nocturnal dyspnea and syncope.   Respiratory: Negative.  Negative for cough, hemoptysis and shortness of breath.    Endocrine: Negative.    Skin: Negative.    Musculoskeletal: Negative.  Negative for back pain, joint pain and joint swelling.   Gastrointestinal: Negative.  Negative for abdominal pain, constipation, diarrhea, hematochezia, melena, nausea and vomiting.   Genitourinary: Negative.  Negative for dysuria, hematuria and incomplete emptying.   Neurological: Negative.  Negative for dizziness, headaches and light-headedness.   Psychiatric/Behavioral: Negative for altered mental status, depression and suicidal ideas. The patient is not nervous/anxious.      Objective:     Vital Signs (Most Recent):  Temp: 97.9 °F (36.6 °C) (06/11/18 0549)  Pulse: 91 (06/11/18 0700)  Resp: 19 (06/11/18 0549)  BP: (!) 118/58 (06/11/18 0549)  SpO2: 98 % (06/11/18 0549) Vital Signs (24h Range):  Temp:  [96 °F (35.6 °C)-98.4 °F (36.9 °C)] 97.9 °F (36.6 °C)  Pulse:  [] 91  Resp:  [14-19] 19  SpO2:  [97 %-99 %] 98 %  BP: (111-128)/(56-63) 118/58     Weight: 57.3 kg (126 lb 5.2 oz)  Body mass index is 18.65 kg/m².     SpO2: 98 %  O2 Device (Oxygen Therapy): room air    Physical Exam   Constitutional: He is oriented to person, place, and time. He appears well-developed and well-nourished. No distress.   HENT:   Head: Normocephalic and  atraumatic.   Mouth/Throat: Oropharynx is clear and moist. No oropharyngeal exudate.   Eyes: Conjunctivae and EOM are normal. Pupils are equal, round, and reactive to light. Right eye exhibits no discharge. Left eye exhibits no discharge. No scleral icterus.   Neck: Normal range of motion. Neck supple. No JVD present. No tracheal deviation present. No thyromegaly present.   Cardiovascular: Normal rate, regular rhythm and intact distal pulses.  Exam reveals no gallop and no friction rub.    Murmur (2/6 systolic murmur in LUSB) heard.  Pulmonary/Chest: Effort normal and breath sounds normal. No respiratory distress. He has no wheezes. He has no rales.   Abdominal: Soft. Bowel sounds are normal. He exhibits no distension and no mass. There is no tenderness. There is no rebound and no guarding.   Musculoskeletal: Normal range of motion.   Lymphadenopathy:     He has no cervical adenopathy.   Neurological: He is alert and oriented to person, place, and time. No cranial nerve deficit.   Skin: Skin is warm and dry. He is not diaphoretic.   Psychiatric: He has a normal mood and affect. His behavior is normal. Judgment and thought content normal.   Nursing note and vitals reviewed.      Significant Labs:   EP:   Recent Labs  Lab 06/11/18  0459 06/11/18  0500     --    K 4.0  --      --    CO2 27  --      --    BUN 11  --    CREATININE 0.9  --    CALCIUM 9.6  --    ANIONGAP 8  --    ESTGFRAFRICA >60.0  --    EGFRNONAA >60.0  --    WBC  --  7.43   HGB  --  15.1   HCT  --  44.7   PLT  --  171   INR 1.1  --        Significant Imaging: Echocardiogram:   2D echo with color flow doppler:   Results for orders placed or performed during the hospital encounter of 08/09/17   2D echo with color flow doppler   Result Value Ref Range    EF 55 55 - 65    Diastolic Dysfunction No     Est. PA Systolic Pressure 35.76     Tricuspid Valve Regurgitation MILD     and EKG: nsr w/ rbbb and lpfb    Assessment and Plan:     *  Syncope    - Will perform EPS and attempt to induce SVT/VT/VF, will also check for conduction system disease as patient with hx of TGA with switch and ECG that shows RBBB + LPFB  - work-up to r/o any significant structural abnormalities / shunts pending  - Will consent for RFA for SVT, PPM for conduction disease, ICD for inducible VT/VF    We discussed the nature of EP study and ablation, including possible transseptal puncture. We discused risks and benefits at length. Our discussion included, but was not limited to the risk of death, infection, bleeding, stroke, MI, cardiac perforation, embolism, cardiac tamponade, skin burns, and other organic injury including the possibility for need for surgery or pacemaker implantation.    Consent obtained            Tim Crow MD  Cardiac Electrophysiology  [facility]

## 2018-06-11 NOTE — NURSING TRANSFER
Nursing Transfer Note      6/11/2018     Transfer To: Echo    Transfer via wheelchair    Transfer with cardiac monitoring    Transported by escort    Chart send with patient: No    Notified: mother

## 2018-06-11 NOTE — ANESTHESIA PREPROCEDURE EVALUATION
[facility]  Anesthesia Pre-Operative Evaluation         Patient Name: Livia May  YOB: 1995  MRN: [id number]    SUBJECTIVE:     Pre-operative evaluation for Procedure(s) (LRB):  ABLATION (N/A)  STUDY-EP (N/A)     06/11/2018    Livia May is a 22 y.o. male w/ a significant PMHx of VSD, transposition of the great arteries s/p correction, aortic coarctation s/p coarctation stent, and HTN. Patient was doing well until 06/08/18 when he suddenly became dizzy and started to sweat. Patient's mother caught him before he fell and called 911.    Patient now presents for the above procedure(s).      LDA:        Peripheral IV - Single Lumen 06/08/18 Left Forearm (Active)   Site Assessment Clean;Dry;Intact 6/10/2018  7:24 PM   Number of days: 3     Prev airway: None documented.    Drips: None documented.    Patient Active Problem List   Diagnosis    Transposition great arteries    VSD (ventricular septal defect)    Aortic coarctation    Personal history of surgery to heart and great vessels, presenting hazards to health    S/P coarctation stent    Adult congenital heart disease    Syncope    Essential hypertension    Transposition of great arteries, corrected    Chest tightness or pressure       Review of patient's allergies indicates:  No Known Allergies    Current Inpatient Medications:   lisinopril  5 mg Oral Daily    nitroGLYCERIN           No current facility-administered medications on file prior to encounter.      Current Outpatient Prescriptions on File Prior to Encounter   Medication Sig Dispense Refill    lisinopril (PRINIVIL,ZESTRIL) 5 MG tablet TAKE 1 TABLET BY MOUTH DAILY **May Cause Dizziness** 30 tablet 12    lisinopril (PRINIVIL,ZESTRIL) 5 MG tablet TAKE 1 TABLET BY MOUTH DAILY **May Cause Dizziness** 30 tablet 12       Past Surgical History:   Procedure Laterality Date    ARTERIAL SWITCH W/ VENTRICULAR SEPTAL DEFECT CLOSURE      CARDIAC CATHETERIZATION   10/11/07    CARDIAC CATHETERIZATION  1/17/12    coarctation of aorta repair/with TGA switch      DIAPHRAGM PLICATION      STRABISMUS SURGERY         Social History     Social History    Marital status: Single     Spouse name: N/A    Number of children: N/A    Years of education: N/A     Occupational History    Not on file.     Social History Main Topics    Smoking status: Never Smoker    Smokeless tobacco: Never Used    Alcohol use No    Drug use: No    Sexual activity: Not on file     Other Topics Concern    Not on file     Social History Narrative    Just graduated from [employer] with a degree in general studies.  Looking for a job.       OBJECTIVE:     Vital Signs Range (Last 24H):  Temp:  [35.6 °C (96 °F)-36.9 °C (98.4 °F)]   Pulse:  [70-97]   Resp:  [14-19]   BP: (111-125)/(56-62)   SpO2:  [96 %-99 %]       CBC:   Recent Labs      06/09/18   0617  06/11/18   0500   WBC  8.58  7.43   RBC  4.57*  4.71   HGB  14.6  15.1   HCT  43.3  44.7   PLT  164  171   MCV  95  95   MCH  31.9*  32.1*   MCHC  33.7  33.8       CMP:   Recent Labs      06/09/18   0617  06/11/18   0459   NA  138  138   K  3.7  4.0   CL  106  103   CO2  25  27   BUN  12  11   CREATININE  0.8  0.9   GLU  100  100   MG  2.4   --    PHOS  3.3   --    CALCIUM  9.1  9.6   ALBUMIN  4.1   --    PROT  7.0   --    ALKPHOS  63   --    ALT  16   --    AST  14   --    BILITOT  2.4*   --        INR:  Recent Labs      06/09/18   0617  06/11/18   0459   INR  1.2  1.1   APTT   --   26.5       Diagnostic Studies: No relevant studies.    EKG (06/11/18):  Vent. Rate : 095 BPM     Atrial Rate : 095 BPM     P-R Int : 128 ms          QRS Dur : 158 ms      QT Int : 400 ms       P-R-T Axes : -16 107 028 degrees     QTc Int : 502 ms    Normal sinus rhythm  Right bundle branch block  Abnormal ECG  When compared with ECG of 09-JUN-2018 04:19,  Borderline criteria for Lateral infarct are no longer Present  T wave inversion now evident in Anterior leads    2D  ECHO:  Results for orders placed or performed during the hospital encounter of 08/09/17   2D echo with color flow doppler   Result Value Ref Range    EF 55 55 - 65    Diastolic Dysfunction No     Est. PA Systolic Pressure 35.76     Tricuspid Valve Regurgitation MILD      CONCLUSIONS     1 - Right heart size is normal with Normal RV systolic function.     2 - Mild tricuspid regurgitation.     3 - The estimated RV systolic pressure is 25 mmHg.     4 - Pulmonary valve not well seen; mild PI. Rao Maneuvar. Proximal PA not well seen.     5 - Normal left ventricular systolic function (EF 55-60%).     6 - Normal left ventricular diastolic function.     7 - On agitated saline injection, there is opacification of the right heart and then LV, without opacification of the LA. Consistent with known LV to RA shunt.     8 - Mild aortic regurgitation.     9 - Descending aorta with peak velocity of 2.1m/sec with stent noted.    ASSESSMENT/PLAN:     Anesthesia Evaluation    I have reviewed the Patient Summary Reports.    I have reviewed the Nursing Notes.   I have reviewed the Medications.     Review of Systems  Anesthesia Hx:  No problems with previous Anesthesia  Denies Family Hx of Anesthesia complications.   Denies Personal Hx of Anesthesia complications.   Social:  Non-Smoker, No Alcohol Use    Hematology/Oncology:        Denies Current/Recent Cancer   EENT/Dental:   denies chronic allergic rhinitis   Cardiovascular:   Exercise tolerance: good Hypertension Denies MI.  Denies CAD.    Denies CABG/stent.  Denies Dysrhythmias.            Congenital Heart Disease    Congenital Heart Disease:  Ventricular Septal Defect (VSD), s/p surgical repair, Transposition of the Great Vessels (TGV), s/p surgical repair, Coarctation of the Aorta, s/p surgical repair    Pulmonary:   Denies COPD.  Denies Asthma.  Denies Recent URI.  Denies Sleep Apnea.    Renal/:   Denies Chronic Renal Disease.     Hepatic/GI:   Denies GERD. Denies Liver  Disease.    Neurological:   Denies TIA. Denies CVA. Denies Seizures.    Endocrine:   Denies Diabetes.    Psych:   Denies Psychiatric History.          Physical Exam  General:  Well nourished    Airway/Jaw/Neck:  Airway Findings: Mouth Opening: Normal Tongue: Normal  General Airway Assessment: Adult  Mallampati: I  TM Distance: Normal, at least 6 cm  Jaw/Neck Findings:  Neck ROM: Normal ROM  Neck Findings: Normal     Dental:  Dental Findings: In tact   Chest/Lungs:  Chest/Lungs Findings: Clear to auscultation, Normal Respiratory Rate     Heart/Vascular:  Heart Findings: Rate: Normal  Rhythm: Regular Rhythm  Sounds: Normal        Mental Status:  Mental Status Findings:  Cooperative, Alert and Oriented         Anesthesia Plan  Type of Anesthesia, risks & benefits discussed:  Anesthesia Type:  general, MAC  Patient's Preference:   Intra-op Monitoring Plan: standard ASA monitors  Intra-op Monitoring Plan Comments:   Post Op Pain Control Plan: multimodal analgesia, IV/PO Opioids PRN and per primary service following discharge from PACU  Post Op Pain Control Plan Comments:   Induction:   IV  Beta Blocker:  Patient is on a Beta-Blocker and has received one dose within the past 24 hours (No further documentation required).       Informed Consent: Patient understands risks and agrees with Anesthesia plan.  Questions answered. Anesthesia consent signed with patient.  ASA Score: 3     Day of Surgery Review of History & Physical:  There are no significant changes.  H&P update referred to the provider.     Anesthesia Plan Notes: Bubble precautions.  Propofol sedation.        Ready For Surgery From Anesthesia Perspective.

## 2018-06-11 NOTE — NURSING
Patient went down to 2nd floor for CTA, accompanied by nurse. Patient received 1 dose of IV 5 mg Metoprolol ( Given MD Mancini) , and I dose of SL nitroglycerin. HR remained stable in the 80's. Tolerated procedure well. Wheeled back to the nurse by nurse, mother notified.

## 2018-06-11 NOTE — PROGRESS NOTES
Bubble study done x3 with and without valsalva via left A/C SL. Tolerated well. Sl flushed before and after with ns.

## 2018-06-11 NOTE — SUBJECTIVE & OBJECTIVE
Interval History:  Patient slept well, no c/o o/n.  No longer tachycardic and no longer having chest tightness. He did not feel well after getting the metoprolol.  He felt weak, lightheaded and dizzy, symptoms have since dissipated.  He had to get his HR down for the CTA yesterday, so he was given 50mg metoprolol/ and 5mg IVP during the procedure along with NTG.  Denies SOB, CP, LH/ Dizziness      Review of Systems   Constitutional: Positive for fatigue (after metoprolol / ntg yesterday). Negative for activity change, appetite change, chills and fever.   HENT: Negative for congestion, sore throat and trouble swallowing.    Eyes: Negative for redness and visual disturbance.   Respiratory: Negative for cough, shortness of breath and wheezing.    Cardiovascular: Negative for chest pain, palpitations and leg swelling.   Gastrointestinal: Negative for abdominal pain, constipation, diarrhea, nausea and vomiting.   Endocrine: Negative for cold intolerance and heat intolerance.   Genitourinary: Negative for decreased urine volume, difficulty urinating and hematuria.   Musculoskeletal: Negative for back pain and myalgias.   Skin: Negative for color change, rash and wound.   Allergic/Immunologic: Negative for immunocompromised state.   Neurological: Positive for dizziness (after metoprolol / ntg yesterday) and light-headedness (after metoprolol / ntg yesterday). Negative for weakness, numbness and headaches.   Hematological: Does not bruise/bleed easily.   Psychiatric/Behavioral: Negative for agitation, decreased concentration and sleep disturbance. The patient is nervous/anxious (nervous about upcoming procedures).      Objective:     Vital Signs (Most Recent):  Temp: 98 °F (36.7 °C) (06/11/18 1329)  Pulse: 75 (06/11/18 1329)  Resp: 16 (06/11/18 1329)  BP: 116/61 (06/11/18 1329)  SpO2: 96 % (06/11/18 1329) Vital Signs (24h Range):  Temp:  [96 °F (35.6 °C)-98.4 °F (36.9 °C)] 98 °F (36.7 °C)  Pulse:  [70-97] 75  Resp:  [14-19]  16  SpO2:  [96 %-99 %] 96 %  BP: (111-125)/(56-62) 116/61     Weight: 57.3 kg (126 lb 5.2 oz)  Body mass index is 18.65 kg/m².    Intake/Output Summary (Last 24 hours) at 06/11/18 1623  Last data filed at 06/11/18 0800   Gross per 24 hour   Intake              720 ml   Output             1925 ml   Net            -1205 ml      Physical Exam   Constitutional: He is oriented to person, place, and time. He appears well-developed and well-nourished. No distress.   HENT:   Head: Normocephalic and atraumatic.   Right Ear: External ear normal.   Left Ear: External ear normal.   Nose: Nose normal.   Eyes: Conjunctivae and EOM are normal.   Neck: Normal range of motion. Neck supple. No JVD present.   Cardiovascular: Normal rate, regular rhythm and intact distal pulses.    Murmur (high pitched blowing and low pitched rumbling) heard.  Pulmonary/Chest: Effort normal and breath sounds normal. No respiratory distress. He has no wheezes. He has no rales.   Abdominal: Soft. Bowel sounds are normal. He exhibits no distension and no mass. There is no tenderness. There is no guarding.   Musculoskeletal: Normal range of motion. He exhibits no edema.   Neurological: He is alert and oriented to person, place, and time. No cranial nerve deficit or sensory deficit. Coordination normal.   Skin: Skin is warm and dry. Capillary refill takes less than 2 seconds. No rash noted. He is not diaphoretic. No erythema.   Psychiatric: He has a normal mood and affect. His behavior is normal. Judgment and thought content normal.   Nursing note and vitals reviewed.      Significant Labs:   BMP:   Recent Labs  Lab 06/12/18  0548   GLU 93      K 4.1      CO2 27   BUN 12   CREATININE 0.8   CALCIUM 9.1     CBC:   Recent Labs  Lab 06/11/18  0500   WBC 7.43   HGB 15.1   HCT 44.7        Significant Imaging: see below    Echo: 1 - Right heart size is normal with Normal RV systolic function.     2 - Mild tricuspid regurgitation.     3 - The  estimated RV systolic pressure is 25 mmHg.     4 - Pulmonary valve not well seen; mild PI. Martinez Maneuvar. Proximal PA not well seen.     5 - Normal left ventricular systolic function (EF 55-60%).     6 - Normal left ventricular diastolic function.     7 - On agitated saline injection, there is opacification of the right heart and then LV, without opacification of the LA. Consistent with known LV to RA shunt.     8 - Mild aortic regurgitation.     9 - Descending aorta with peak velocity of 2.1m/sec with stent noted..     CTA: 1.  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  No stenosis identified in the reimplanted coronary arteries.    2.  Possible thickening of the anterior leaflet of the mitral valve; please correlate with echocardiography.    3.  I suspect perimembranous VSD patch.    4.  Measurements are provided for the aorta and pulmonary arteries.  Stent in the proximal descending aorta represents treatment for coarctation.    5.  Arch and isthmus are incompletely included on this study diverted to assessment for coronary artery anatomy and caliber.    EPS: pending

## 2018-06-11 NOTE — PLAN OF CARE
Problem: Patient Care Overview  Goal: Plan of Care Review  Outcome: Revised  Plan of care discussed with patient. Patient is free of fall/trauma/injury. Denies CP, SOB, or pain/discomfort. Currently NPO for CTA, and EP studies. Echo done today( Result pending). PO 50 mg metoprolol initiated this AM.  All questions addressed. Will continue to monitor

## 2018-06-11 NOTE — NURSING TRANSFER
Nursing Transfer Note      6/11/2018      Transfer From: Echo    Transfer via wheelchair    Transfer with cardiac monitoring    Transported by escort    Chart send with patient: No    Notified: mother

## 2018-06-11 NOTE — SUBJECTIVE & OBJECTIVE
Interval History: NAEON    Review of Systems   Constitution: Negative. Negative for chills and fever.   HENT: Negative.  Negative for hoarse voice and sore throat.    Eyes: Negative.    Cardiovascular: Negative.  Negative for chest pain, claudication, cyanosis, dyspnea on exertion, irregular heartbeat, leg swelling, near-syncope, orthopnea, paroxysmal nocturnal dyspnea and syncope.   Respiratory: Negative.  Negative for cough, hemoptysis and shortness of breath.    Endocrine: Negative.    Skin: Negative.    Musculoskeletal: Negative.  Negative for back pain, joint pain and joint swelling.   Gastrointestinal: Negative.  Negative for abdominal pain, constipation, diarrhea, hematochezia, melena, nausea and vomiting.   Genitourinary: Negative.  Negative for dysuria, hematuria and incomplete emptying.   Neurological: Negative.  Negative for dizziness, headaches and light-headedness.   Psychiatric/Behavioral: Negative for altered mental status, depression and suicidal ideas. The patient is not nervous/anxious.      Objective:     Vital Signs (Most Recent):  Temp: 97.9 °F (36.6 °C) (06/11/18 0549)  Pulse: 91 (06/11/18 0700)  Resp: 19 (06/11/18 0549)  BP: (!) 118/58 (06/11/18 0549)  SpO2: 98 % (06/11/18 0549) Vital Signs (24h Range):  Temp:  [96 °F (35.6 °C)-98.4 °F (36.9 °C)] 97.9 °F (36.6 °C)  Pulse:  [] 91  Resp:  [14-19] 19  SpO2:  [97 %-99 %] 98 %  BP: (111-128)/(56-63) 118/58     Weight: 57.3 kg (126 lb 5.2 oz)  Body mass index is 18.65 kg/m².     SpO2: 98 %  O2 Device (Oxygen Therapy): room air    Physical Exam   Constitutional: He is oriented to person, place, and time. He appears well-developed and well-nourished. No distress.   HENT:   Head: Normocephalic and atraumatic.   Mouth/Throat: Oropharynx is clear and moist. No oropharyngeal exudate.   Eyes: Conjunctivae and EOM are normal. Pupils are equal, round, and reactive to light. Right eye exhibits no discharge. Left eye exhibits no discharge. No scleral  icterus.   Neck: Normal range of motion. Neck supple. No JVD present. No tracheal deviation present. No thyromegaly present.   Cardiovascular: Normal rate, regular rhythm and intact distal pulses.  Exam reveals no gallop and no friction rub.    Murmur (2/6 systolic murmur in LUSB) heard.  Pulmonary/Chest: Effort normal and breath sounds normal. No respiratory distress. He has no wheezes. He has no rales.   Abdominal: Soft. Bowel sounds are normal. He exhibits no distension and no mass. There is no tenderness. There is no rebound and no guarding.   Musculoskeletal: Normal range of motion.   Lymphadenopathy:     He has no cervical adenopathy.   Neurological: He is alert and oriented to person, place, and time. No cranial nerve deficit.   Skin: Skin is warm and dry. He is not diaphoretic.   Psychiatric: He has a normal mood and affect. His behavior is normal. Judgment and thought content normal.   Nursing note and vitals reviewed.      Significant Labs:   EP:   Recent Labs  Lab 06/11/18  0459 06/11/18  0500     --    K 4.0  --      --    CO2 27  --      --    BUN 11  --    CREATININE 0.9  --    CALCIUM 9.6  --    ANIONGAP 8  --    ESTGFRAFRICA >60.0  --    EGFRNONAA >60.0  --    WBC  --  7.43   HGB  --  15.1   HCT  --  44.7   PLT  --  171   INR 1.1  --        Significant Imaging: Echocardiogram:   2D echo with color flow doppler:   Results for orders placed or performed during the hospital encounter of 08/09/17   2D echo with color flow doppler   Result Value Ref Range    EF 55 55 - 65    Diastolic Dysfunction No     Est. PA Systolic Pressure 35.76     Tricuspid Valve Regurgitation MILD     and EKG: nsr w/ rbbb and lpfb

## 2018-06-11 NOTE — SUBJECTIVE & OBJECTIVE
Interval History: Patient slept well, no c/o o/n.  No longer tachycardic and no longer having chest tightness. Denies SOB, CP, LH/ Dizziness    Review of Systems   Constitutional: Negative for activity change, appetite change, chills, fatigue and fever.   HENT: Negative for congestion, sore throat and trouble swallowing.    Respiratory: Positive for chest tightness (during sinus tach episode, now since resolved ). Negative for cough, shortness of breath and wheezing.    Cardiovascular: Negative for chest pain, palpitations and leg swelling.   Gastrointestinal: Negative for abdominal pain, constipation, diarrhea, nausea and vomiting.   Genitourinary: Negative for decreased urine volume, difficulty urinating and hematuria.   Musculoskeletal: Negative for back pain and myalgias.   Skin: Negative for color change, rash and wound.   Neurological: Negative for dizziness, weakness, light-headedness, numbness and headaches.   Hematological: Does not bruise/bleed easily.   Psychiatric/Behavioral: Negative for agitation, decreased concentration and sleep disturbance. The patient is not nervous/anxious.      Objective:     Vital Signs (Most Recent):  Temp: 98 °F (36.7 °C) (06/11/18 1329)  Pulse: 75 (06/11/18 1329)  Resp: 16 (06/11/18 1329)  BP: 116/61 (06/11/18 1329)  SpO2: 96 % (06/11/18 1329) Vital Signs (24h Range):  Temp:  [96 °F (35.6 °C)-98.4 °F (36.9 °C)] 98 °F (36.7 °C)  Pulse:  [70-97] 75  Resp:  [14-19] 16  SpO2:  [96 %-99 %] 96 %  BP: (111-125)/(56-62) 116/61     Weight: 57.3 kg (126 lb 5.2 oz)  Body mass index is 18.65 kg/m².    Intake/Output Summary (Last 24 hours) at 06/11/18 1515  Last data filed at 06/11/18 0600   Gross per 24 hour   Intake              720 ml   Output             1125 ml   Net             -405 ml      Physical Exam   Constitutional: He is oriented to person, place, and time. He appears well-developed and well-nourished. No distress.   HENT:   Head: Normocephalic and atraumatic.   Right Ear:  External ear normal.   Left Ear: External ear normal.   Nose: Nose normal.   Eyes: Conjunctivae and EOM are normal.   Neck: Normal range of motion. Neck supple. No JVD present.   Cardiovascular: Normal rate, regular rhythm and intact distal pulses.    Murmur (combined high pitched blowing and low pitched rumbling ) heard.  Pulmonary/Chest: Effort normal and breath sounds normal. No respiratory distress. He has no wheezes. He has no rales.   Abdominal: Soft. Bowel sounds are normal. He exhibits no distension and no mass. There is no tenderness. There is no guarding.   Musculoskeletal: Normal range of motion. He exhibits no edema.   Neurological: He is alert and oriented to person, place, and time. No cranial nerve deficit or sensory deficit. Coordination normal.   Skin: Skin is warm and dry. No rash noted. He is not diaphoretic. No erythema.   Psychiatric: He has a normal mood and affect. His behavior is normal. Judgment and thought content normal.   Nursing note and vitals reviewed.      Significant Labs:   BMP:     Recent Labs  Lab 06/11/18  0459         K 4.0      CO2 27   BUN 11   CREATININE 0.9   CALCIUM 9.6     CBC:     Recent Labs  Lab 06/11/18  0500   WBC 7.43   HGB 15.1   HCT 44.7        Coagulation:     Recent Labs  Lab 06/11/18  0459   INR 1.1   APTT 26.5       Significant Imaging: No acute findings in the chest.

## 2018-06-11 NOTE — PLAN OF CARE
06/11/18 1050   Discharge Assessment   Assessment Type Discharge Planning Reassessment   Confirmed/corrected address and phone number on facesheet? Yes   Assessment information obtained from? Caregiver;Medical Record   Expected Length of Stay (days) 3   Communicated expected length of stay with patient/caregiver yes   Prior to hospitilization cognitive status: Alert/Oriented   Prior to hospitalization functional status: Independent   Current cognitive status: Alert/Oriented   Current Functional Status: Independent   Lives With parent(s)   Able to Return to Prior Arrangements yes   Is patient able to care for self after discharge? Yes   Patient's perception of discharge disposition home or selfcare   Readmission Within The Last 30 Days no previous admission in last 30 days   Patient currently being followed by outpatient case management? No   Patient currently receives any other outside agency services? No   Equipment Currently Used at Home none   Do you have any problems affording any of your prescribed medications? No   Is the patient taking medications as prescribed? yes   Does the patient have transportation home? Yes   Transportation Available family or friend will provide   Does the patient receive services at the [facility]? No   Discharge Plan A Home with family   Admitted with syncope and congenital heart disease. Lives with parents and is independent in his ADLs. Plan is to DC home. No DC needs identified.

## 2018-06-12 ENCOUNTER — SURGERY (OUTPATIENT)
Age: 23
End: 2018-06-12

## 2018-06-12 LAB
ANION GAP SERPL CALC-SCNC: 9 MMOL/L
BUN SERPL-MCNC: 12 MG/DL
CALCIUM SERPL-MCNC: 9.1 MG/DL
CHLORIDE SERPL-SCNC: 103 MMOL/L
CO2 SERPL-SCNC: 27 MMOL/L
CREAT SERPL-MCNC: 0.8 MG/DL
EST. GFR  (AFRICAN AMERICAN): >60 ML/MIN/1.73 M^2
EST. GFR  (NON AFRICAN AMERICAN): >60 ML/MIN/1.73 M^2
GLUCOSE SERPL-MCNC: 93 MG/DL
POTASSIUM SERPL-SCNC: 4.1 MMOL/L
SODIUM SERPL-SCNC: 139 MMOL/L

## 2018-06-12 PROCEDURE — 99233 SBSQ HOSP IP/OBS HIGH 50: CPT | Mod: ,,, | Performed by: INTERNAL MEDICINE

## 2018-06-12 PROCEDURE — 25000003 PHARM REV CODE 250: Performed by: NURSE PRACTITIONER

## 2018-06-12 PROCEDURE — 4A0234Z MEASUREMENT OF CARDIAC ELECTRICAL ACTIVITY, PERCUTANEOUS APPROACH: ICD-10-PCS | Performed by: INTERNAL MEDICINE

## 2018-06-12 PROCEDURE — D9220A PRA ANESTHESIA: Mod: CRNA,,, | Performed by: NURSE ANESTHETIST, CERTIFIED REGISTERED

## 2018-06-12 PROCEDURE — 80048 BASIC METABOLIC PNL TOTAL CA: CPT

## 2018-06-12 PROCEDURE — 94761 N-INVAS EAR/PLS OXIMETRY MLT: CPT

## 2018-06-12 PROCEDURE — D9220A PRA ANESTHESIA: Mod: ANES,,, | Performed by: ANESTHESIOLOGY

## 2018-06-12 PROCEDURE — 25000003 PHARM REV CODE 250: Performed by: HOSPITALIST

## 2018-06-12 PROCEDURE — 25000003 PHARM REV CODE 250

## 2018-06-12 PROCEDURE — 0JH632Z INSERTION OF MONITORING DEVICE INTO CHEST SUBCUTANEOUS TISSUE AND FASCIA, PERCUTANEOUS APPROACH: ICD-10-PCS | Performed by: INTERNAL MEDICINE

## 2018-06-12 PROCEDURE — C1730 CATH, EP, 19 OR FEW ELECT: HCPCS

## 2018-06-12 PROCEDURE — 63600175 PHARM REV CODE 636 W HCPCS: Performed by: NURSE ANESTHETIST, CERTIFIED REGISTERED

## 2018-06-12 PROCEDURE — C1764 EVENT RECORDER, CARDIAC: HCPCS

## 2018-06-12 PROCEDURE — 33282 EP LAB PROCEDURE: CPT | Mod: ,,, | Performed by: INTERNAL MEDICINE

## 2018-06-12 PROCEDURE — 93010 ELECTROCARDIOGRAM REPORT: CPT | Mod: ,,, | Performed by: INTERNAL MEDICINE

## 2018-06-12 PROCEDURE — 4A023FZ MEASUREMENT OF CARDIAC RHYTHM, PERCUTANEOUS APPROACH: ICD-10-PCS | Performed by: INTERNAL MEDICINE

## 2018-06-12 PROCEDURE — 63600175 PHARM REV CODE 636 W HCPCS: Performed by: ANESTHESIOLOGY

## 2018-06-12 PROCEDURE — 93620 COMP EP EVL R AT VEN PAC&REC: CPT | Mod: 26,,, | Performed by: INTERNAL MEDICINE

## 2018-06-12 PROCEDURE — 37000008 HC ANESTHESIA 1ST 15 MINUTES: Performed by: INTERNAL MEDICINE

## 2018-06-12 PROCEDURE — 27200198 EP LAB PROCEDURE

## 2018-06-12 PROCEDURE — 37000009 HC ANESTHESIA EA ADD 15 MINS: Performed by: INTERNAL MEDICINE

## 2018-06-12 PROCEDURE — 20600001 HC STEP DOWN PRIVATE ROOM

## 2018-06-12 PROCEDURE — 93623 PRGRMD STIMJ&PACG IV RX NFS: CPT | Mod: 26,,, | Performed by: INTERNAL MEDICINE

## 2018-06-12 PROCEDURE — 25000003 PHARM REV CODE 250: Performed by: NURSE ANESTHETIST, CERTIFIED REGISTERED

## 2018-06-12 PROCEDURE — 36415 COLL VENOUS BLD VENIPUNCTURE: CPT

## 2018-06-12 PROCEDURE — 93005 ELECTROCARDIOGRAM TRACING: CPT

## 2018-06-12 PROCEDURE — 99232 SBSQ HOSP IP/OBS MODERATE 35: CPT | Mod: ,,, | Performed by: NURSE PRACTITIONER

## 2018-06-12 PROCEDURE — 63600175 PHARM REV CODE 636 W HCPCS: Performed by: INTERNAL MEDICINE

## 2018-06-12 RX ORDER — SODIUM CHLORIDE 0.9 % (FLUSH) 0.9 %
3 SYRINGE (ML) INJECTION
Status: DISCONTINUED | OUTPATIENT
Start: 2018-06-12 | End: 2018-06-12 | Stop reason: HOSPADM

## 2018-06-12 RX ORDER — FENTANYL CITRATE 50 UG/ML
25 INJECTION, SOLUTION INTRAMUSCULAR; INTRAVENOUS EVERY 5 MIN PRN
Status: DISCONTINUED | OUTPATIENT
Start: 2018-06-12 | End: 2018-06-12 | Stop reason: HOSPADM

## 2018-06-12 RX ORDER — PROPOFOL 10 MG/ML
VIAL (ML) INTRAVENOUS CONTINUOUS PRN
Status: DISCONTINUED | OUTPATIENT
Start: 2018-06-12 | End: 2018-06-13

## 2018-06-12 RX ORDER — PROPOFOL 10 MG/ML
VIAL (ML) INTRAVENOUS
Status: DISCONTINUED | OUTPATIENT
Start: 2018-06-12 | End: 2018-06-13

## 2018-06-12 RX ORDER — PHENYLEPHRINE HYDROCHLORIDE 10 MG/ML
INJECTION INTRAVENOUS
Status: DISCONTINUED | OUTPATIENT
Start: 2018-06-12 | End: 2018-06-13

## 2018-06-12 RX ORDER — SODIUM CHLORIDE 9 MG/ML
1000 INJECTION, SOLUTION INTRAVENOUS CONTINUOUS
Status: DISCONTINUED | OUTPATIENT
Start: 2018-06-12 | End: 2018-06-13 | Stop reason: HOSPADM

## 2018-06-12 RX ORDER — MIDAZOLAM HYDROCHLORIDE 1 MG/ML
INJECTION, SOLUTION INTRAMUSCULAR; INTRAVENOUS
Status: DISCONTINUED | OUTPATIENT
Start: 2018-06-12 | End: 2018-06-13

## 2018-06-12 RX ADMIN — SODIUM CHLORIDE 1000 ML: 0.9 INJECTION, SOLUTION INTRAVENOUS at 11:06

## 2018-06-12 RX ADMIN — FENTANYL CITRATE 25 MCG: 50 INJECTION, SOLUTION INTRAMUSCULAR; INTRAVENOUS at 06:06

## 2018-06-12 RX ADMIN — PROPOFOL 100 MCG/KG/MIN: 10 INJECTION, EMULSION INTRAVENOUS at 02:06

## 2018-06-12 RX ADMIN — PROPOFOL 20 MG: 10 INJECTION, EMULSION INTRAVENOUS at 02:06

## 2018-06-12 RX ADMIN — ACETAMINOPHEN 650 MG: 325 TABLET ORAL at 09:06

## 2018-06-12 RX ADMIN — PHENYLEPHRINE HYDROCHLORIDE 50 MCG: 10 INJECTION INTRAVENOUS at 03:06

## 2018-06-12 RX ADMIN — DEXTROSE 2 G: 50 INJECTION, SOLUTION INTRAVENOUS at 05:06

## 2018-06-12 RX ADMIN — SODIUM CHLORIDE: 0.9 INJECTION, SOLUTION INTRAVENOUS at 03:06

## 2018-06-12 RX ADMIN — CEFAZOLIN 2 G: 330 INJECTION, POWDER, FOR SOLUTION INTRAMUSCULAR; INTRAVENOUS at 02:06

## 2018-06-12 RX ADMIN — ISOPROTERENOL HYDROCHLORIDE 1 MCG/MIN: 0.2 INJECTION, SOLUTION INTRAMUSCULAR; INTRAVENOUS at 04:06

## 2018-06-12 RX ADMIN — PROPOFOL: 10 INJECTION, EMULSION INTRAVENOUS at 03:06

## 2018-06-12 RX ADMIN — PROPOFOL 30 MG: 10 INJECTION, EMULSION INTRAVENOUS at 02:06

## 2018-06-12 RX ADMIN — SODIUM CHLORIDE: 0.9 INJECTION, SOLUTION INTRAVENOUS at 02:06

## 2018-06-12 RX ADMIN — LISINOPRIL 5 MG: 5 TABLET ORAL at 08:06

## 2018-06-12 RX ADMIN — ACETAMINOPHEN 650 MG: 325 TABLET ORAL at 06:06

## 2018-06-12 RX ADMIN — MIDAZOLAM 2 MG: 1 INJECTION INTRAMUSCULAR; INTRAVENOUS at 02:06

## 2018-06-12 RX ADMIN — PROCAINAMIDE HYDROCHLORIDE 250 MG: 100 INJECTION, SOLUTION INTRAMUSCULAR; INTRAVENOUS at 04:06

## 2018-06-12 RX ADMIN — PROPOFOL 30 MG: 10 INJECTION, EMULSION INTRAVENOUS at 04:06

## 2018-06-12 NOTE — PLAN OF CARE
Problem: Patient Care Overview  Goal: Plan of Care Review  Outcome: Ongoing (interventions implemented as appropriate)  Patient cooperative and pleasant with routine care and procedures.  Patient instructed on nothing by mouth after midnight for procedures pending.  Patient verbalized understanding.  Mother at bedside and resting quietly.  Will continue to monitor.

## 2018-06-12 NOTE — ASSESSMENT & PLAN NOTE
- Pediatric EP and adult EP have both seen and collaborating on patient care  - CTA to look at coronary anatomy 6/11/18  - EPS 6/12/18 pending findings

## 2018-06-12 NOTE — ASSESSMENT & PLAN NOTE
- during episode of sinus tachycardia, ECG without changes or ischemia   - troponin on admit < 0.006  - Normal coronaries [facility] 2007, going for cardiac CTA to look at coronary anatomy

## 2018-06-12 NOTE — PROGRESS NOTES
1100 Pt BP 99/55 pt sitting in bed denies symptoms of light headed or dizziness. Spoke with Larissa Miles NP advised start NS 125mL/h. Instructions carried out will continue to monitor.

## 2018-06-12 NOTE — ASSESSMENT & PLAN NOTE
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 We discused risks and benefits at length. Our discussion included, but was not limited to the risk of death, infection, bleeding, stroke, MI, cardiac perforation, embolism, cardiac tamponade, skin burns, and other organic injury including the possibility for need for surgery or pacemaker implantation.    Consents obtained

## 2018-06-12 NOTE — PROGRESS NOTES
[facility]  Cardiac Electrophysiology  Progress Note    Admission Date: 6/9/2018  Code Status: Full Code   Attending Physician: Pb Garcia MD   Expected Discharge Date: 6/14/2018  Principal Problem:Syncope    Subjective:     Interval History: NAEON    Review of Systems   Constitution: Negative. Negative for chills and fever.   HENT: Negative.  Negative for hoarse voice and sore throat.    Eyes: Negative.    Cardiovascular: Negative.  Negative for chest pain, claudication, cyanosis, dyspnea on exertion, irregular heartbeat, leg swelling, near-syncope, orthopnea, paroxysmal nocturnal dyspnea and syncope.   Respiratory: Negative.  Negative for cough, hemoptysis and shortness of breath.    Endocrine: Negative.    Skin: Negative.    Musculoskeletal: Negative.  Negative for back pain, joint pain and joint swelling.   Gastrointestinal: Negative.  Negative for abdominal pain, constipation, diarrhea, hematochezia, melena, nausea and vomiting.   Genitourinary: Negative.  Negative for dysuria, hematuria and incomplete emptying.   Neurological: Negative.  Negative for dizziness, headaches and light-headedness.   Psychiatric/Behavioral: Negative for altered mental status, depression and suicidal ideas. The patient is not nervous/anxious.      Objective:     Vital Signs (Most Recent):  Temp: 97.9 °F (36.6 °C) (06/12/18 0834)  Pulse: 91 (06/12/18 0834)  Resp: 16 (06/12/18 0834)  BP: (!) 122/59 (06/12/18 0834)  SpO2: 97 % (06/12/18 0834) Vital Signs (24h Range):  Temp:  [97.6 °F (36.4 °C)-98.5 °F (36.9 °C)] 97.9 °F (36.6 °C)  Pulse:  [68-91] 91  Resp:  [16-20] 16  SpO2:  [96 %-99 %] 97 %  BP: (107-122)/(53-61) 122/59     Weight: 57.3 kg (126 lb 5.2 oz)  Body mass index is 18.65 kg/m².     SpO2: 97 %  O2 Device (Oxygen Therapy): room air    Physical Exam   Constitutional: He is oriented to person, place, and time. He appears well-developed and well-nourished. No distress.   HENT:   Head: Normocephalic and  atraumatic.   Mouth/Throat: Oropharynx is clear and moist. No oropharyngeal exudate.   Eyes: Conjunctivae and EOM are normal. Pupils are equal, round, and reactive to light. Right eye exhibits no discharge. Left eye exhibits no discharge. No scleral icterus.   Neck: Normal range of motion. Neck supple. No JVD present. No tracheal deviation present. No thyromegaly present.   Cardiovascular: Normal rate, regular rhythm and intact distal pulses.  Exam reveals no gallop and no friction rub.    Murmur (2/6 systolic murmur in LUSB) heard.  Pulmonary/Chest: Effort normal and breath sounds normal. No respiratory distress. He has no wheezes. He has no rales.   Abdominal: Soft. Bowel sounds are normal. He exhibits no distension and no mass. There is no tenderness. There is no rebound and no guarding.   Musculoskeletal: Normal range of motion.   Lymphadenopathy:     He has no cervical adenopathy.   Neurological: He is alert and oriented to person, place, and time. No cranial nerve deficit.   Skin: Skin is warm and dry. He is not diaphoretic.   Psychiatric: He has a normal mood and affect. His behavior is normal. Judgment and thought content normal.   Nursing note and vitals reviewed.      Significant Labs:   EP:   Recent Labs  Lab 06/11/18  0459 06/11/18  0500 06/12/18  0548     --  139   K 4.0  --  4.1     --  103   CO2 27  --  27     --  93   BUN 11  --  12   CREATININE 0.9  --  0.8   CALCIUM 9.6  --  9.1   ANIONGAP 8  --  9   ESTGFRAFRICA >60.0  --  >60.0   EGFRNONAA >60.0  --  >60.0   WBC  --  7.43  --    HGB  --  15.1  --    HCT  --  44.7  --    PLT  --  171  --    INR 1.1  --   --        Significant Imaging: Echocardiogram:   2D echo with color flow doppler:   Results for orders placed or performed during the hospital encounter of 08/09/17   2D echo with color flow doppler   Result Value Ref Range    EF 55 55 - 65    Diastolic Dysfunction No     Est. 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 We discused risks and benefits at length. Our discussion included, but was not limited to the risk of death, infection, bleeding, stroke, MI, cardiac perforation, embolism, cardiac tamponade, skin burns, and other organic injury including the possibility for need for surgery or pacemaker implantation.    Consents obtained            Tim Crow MD  Cardiac Electrophysiology  [facility]

## 2018-06-12 NOTE — SUBJECTIVE & OBJECTIVE
Interval History: NAEON    Review of Systems   Constitution: Negative. Negative for chills and fever.   HENT: Negative.  Negative for hoarse voice and sore throat.    Eyes: Negative.    Cardiovascular: Negative.  Negative for chest pain, claudication, cyanosis, dyspnea on exertion, irregular heartbeat, leg swelling, near-syncope, orthopnea, paroxysmal nocturnal dyspnea and syncope.   Respiratory: Negative.  Negative for cough, hemoptysis and shortness of breath.    Endocrine: Negative.    Skin: Negative.    Musculoskeletal: Negative.  Negative for back pain, joint pain and joint swelling.   Gastrointestinal: Negative.  Negative for abdominal pain, constipation, diarrhea, hematochezia, melena, nausea and vomiting.   Genitourinary: Negative.  Negative for dysuria, hematuria and incomplete emptying.   Neurological: Negative.  Negative for dizziness, headaches and light-headedness.   Psychiatric/Behavioral: Negative for altered mental status, depression and suicidal ideas. The patient is not nervous/anxious.      Objective:     Vital Signs (Most Recent):  Temp: 97.9 °F (36.6 °C) (06/12/18 0834)  Pulse: 91 (06/12/18 0834)  Resp: 16 (06/12/18 0834)  BP: (!) 122/59 (06/12/18 0834)  SpO2: 97 % (06/12/18 0834) Vital Signs (24h Range):  Temp:  [97.6 °F (36.4 °C)-98.5 °F (36.9 °C)] 97.9 °F (36.6 °C)  Pulse:  [68-91] 91  Resp:  [16-20] 16  SpO2:  [96 %-99 %] 97 %  BP: (107-122)/(53-61) 122/59     Weight: 57.3 kg (126 lb 5.2 oz)  Body mass index is 18.65 kg/m².     SpO2: 97 %  O2 Device (Oxygen Therapy): room air    Physical Exam   Constitutional: He is oriented to person, place, and time. He appears well-developed and well-nourished. No distress.   HENT:   Head: Normocephalic and atraumatic.   Mouth/Throat: Oropharynx is clear and moist. No oropharyngeal exudate.   Eyes: Conjunctivae and EOM are normal. Pupils are equal, round, and reactive to light. Right eye exhibits no discharge. Left eye exhibits no discharge. No scleral  icterus.   Neck: Normal range of motion. Neck supple. No JVD present. No tracheal deviation present. No thyromegaly present.   Cardiovascular: Normal rate, regular rhythm and intact distal pulses.  Exam reveals no gallop and no friction rub.    Murmur (2/6 systolic murmur in LUSB) heard.  Pulmonary/Chest: Effort normal and breath sounds normal. No respiratory distress. He has no wheezes. He has no rales.   Abdominal: Soft. Bowel sounds are normal. He exhibits no distension and no mass. There is no tenderness. There is no rebound and no guarding.   Musculoskeletal: Normal range of motion.   Lymphadenopathy:     He has no cervical adenopathy.   Neurological: He is alert and oriented to person, place, and time. No cranial nerve deficit.   Skin: Skin is warm and dry. He is not diaphoretic.   Psychiatric: He has a normal mood and affect. His behavior is normal. Judgment and thought content normal.   Nursing note and vitals reviewed.      Significant Labs:   EP:   Recent Labs  Lab 06/11/18  0459 06/11/18  0500 06/12/18  0548     --  139   K 4.0  --  4.1     --  103   CO2 27  --  27     --  93   BUN 11  --  12   CREATININE 0.9  --  0.8   CALCIUM 9.6  --  9.1   ANIONGAP 8  --  9   ESTGFRAFRICA >60.0  --  >60.0   EGFRNONAA >60.0  --  >60.0   WBC  --  7.43  --    HGB  --  15.1  --    HCT  --  44.7  --    PLT  --  171  --    INR 1.1  --   --        Significant Imaging: Echocardiogram:   2D echo with color flow doppler:   Results for orders placed or performed during the hospital encounter of 08/09/17   2D echo with color flow doppler   Result Value Ref Range    EF 55 55 - 65    Diastolic Dysfunction No     Est. PA Systolic Pressure 35.76     Tricuspid Valve Regurgitation MILD

## 2018-06-12 NOTE — PROGRESS NOTES
[facility] Hospital Medicine  Progress Note    Patient Name: Livia May  MRN: [id number]  Patient Class: IP- Inpatient   Admission Date: 6/9/2018  Length of Stay: 2 days  Attending Physician: Pb Garcia MD  Primary Care Provider: Isaías Lambert MD    [facility] Medicine Team: [facility] ANGELINA Miles NP    Subjective:     Principal Problem:Syncope    HPI:  Livia May is a 22 y.o. male who presents for follow-up of complex congenital heart disease who has been followed by Dr. Patrick Godfrey in the Adult Cardiology clinic here.  To summarize, his diagnoses are as follows:  1.  TGA/VSD s/p arterial switch [facility]   2.  Coarctation stent (2012 -[facility]).  3.  Residual LV to RA shunt (1.4:1 shunt on cath 2007, very small shunt noted on 2012 cardiac catheterization)  4.  Normal coronaries on cath 2007  5.  Mild aortic insufficiency and aortic root enlargement  6.  Likely very mild branch pulmonary artery stenosis  7.  Likely PFO (cath 2007)  8.  Mildly abnormal appearing tricuspid valve without significant tricuspid insufficiency or stenosis     HPI:  He was doing well until earlier yesterday around 6 pm when  suddenly he became dizzy and his vision became blurry while trimming his nails standing up in the kitchen. He turned pale and diaphoretic, then passed out while his mother was there with him. Mother caught him and lay him to the floor to prevent the fall.  She was unable to feel a pulse at the time.  In the past he has had a holter which showed SVT.  She activated EMS and he slowly gained consciousness prior to EMS arrival. By the time he arrived at the ED he was back to baseline.  The pediatric Cardiologist there felt he needed to come to [facility], so cardiology was contacted and is willing to assist in his care. He has annual clinic visit coming up with Dr. Godfrey in august.      VS: /77, P 88, RR 13, %, T98.0F     LABS: BMP -  NML, CBC - NML, TROP - NEGATIVE, BNP - NML     RADIOGRAPHS:  CXR: no acute findings     EKG: RBBB with no changes from old, and no ischemic changes     Currently he is asymptomatic. Conversant, AAO X 4. Mother at bedside reports he looks back to his normal self.        Hospital Course:  Admitted to hospital medicine for witnessed syncopal episode.  He revived on his own.   Congenital cards, and pediatric EP were consulted as he has h/o NSVT (5 beats on a holter), he also was getting chest tightness with sinus tachycardia, which he states he normally doesn't get.  He was seen by Dr. Arvind Acuña from pediatric EP who believes this was a vasovagal episode, however with his anatomy, should have a full EPS.  Dr Ley was contacted to perform procedure as Dr. Acuña will be out of town.  Dr. Ley stated his DDx of syncope includes vasovagal, bradycardia (especially given RBBB/LPFB), VT/VF (though recovered without CPR or electrical cardioversion). Also with hx of nonsustained atrial arrhythmias, by report, on prior monitoring.  S/p Echo and CTA results below.  EPS 6/12/18 pending findings, possible ablation, PPM, ICD vs ILR.    Dispo: f/u pediatric EP and Dr. Patrick Godfrey with congenital cardiology, no home needs, mom is an ED NP      Interval History:  Patient slept well, no c/o o/n.  No longer tachycardic and no longer having chest tightness. He did not feel well after getting the metoprolol.  He felt weak, lightheaded and dizzy, symptoms have since dissipated.  He had to get his HR down for the CTA yesterday, so he was given 50mg metoprolol/ and 5mg IVP during the procedure along with NTG.  Denies SOB, CP, LH/ Dizziness      Review of Systems   Constitutional: Positive for fatigue (after metoprolol / ntg yesterday). Negative for activity change, appetite change, chills and fever.   HENT: Negative for congestion, sore throat and trouble swallowing.    Eyes: Negative for redness and visual disturbance.   Respiratory:  Negative for cough, shortness of breath and wheezing.    Cardiovascular: Negative for chest pain, palpitations and leg swelling.   Gastrointestinal: Negative for abdominal pain, constipation, diarrhea, nausea and vomiting.   Endocrine: Negative for cold intolerance and heat intolerance.   Genitourinary: Negative for decreased urine volume, difficulty urinating and hematuria.   Musculoskeletal: Negative for back pain and myalgias.   Skin: Negative for color change, rash and wound.   Allergic/Immunologic: Negative for immunocompromised state.   Neurological: Positive for dizziness (after metoprolol / ntg yesterday) and light-headedness (after metoprolol / ntg yesterday). Negative for weakness, numbness and headaches.   Hematological: Does not bruise/bleed easily.   Psychiatric/Behavioral: Negative for agitation, decreased concentration and sleep disturbance. The patient is nervous/anxious (nervous about upcoming procedures).      Objective:     Vital Signs (Most Recent):  Temp: 98 °F (36.7 °C) (06/11/18 1329)  Pulse: 75 (06/11/18 1329)  Resp: 16 (06/11/18 1329)  BP: 116/61 (06/11/18 1329)  SpO2: 96 % (06/11/18 1329) Vital Signs (24h Range):  Temp:  [96 °F (35.6 °C)-98.4 °F (36.9 °C)] 98 °F (36.7 °C)  Pulse:  [70-97] 75  Resp:  [14-19] 16  SpO2:  [96 %-99 %] 96 %  BP: (111-125)/(56-62) 116/61     Weight: 57.3 kg (126 lb 5.2 oz)  Body mass index is 18.65 kg/m².    Intake/Output Summary (Last 24 hours) at 06/11/18 1623  Last data filed at 06/11/18 0800   Gross per 24 hour   Intake              720 ml   Output             1925 ml   Net            -1205 ml      Physical Exam   Constitutional: He is oriented to person, place, and time. He appears well-developed and well-nourished. No distress.   HENT:   Head: Normocephalic and atraumatic.   Right Ear: External ear normal.   Left Ear: External ear normal.   Nose: Nose normal.   Eyes: Conjunctivae and EOM are normal.   Neck: Normal range of motion. Neck supple. No JVD  present.   Cardiovascular: Normal rate, regular rhythm and intact distal pulses.    Murmur (high pitched blowing and low pitched rumbling) heard.  Pulmonary/Chest: Effort normal and breath sounds normal. No respiratory distress. He has no wheezes. He has no rales.   Abdominal: Soft. Bowel sounds are normal. He exhibits no distension and no mass. There is no tenderness. There is no guarding.   Musculoskeletal: Normal range of motion. He exhibits no edema.   Neurological: He is alert and oriented to person, place, and time. No cranial nerve deficit or sensory deficit. Coordination normal.   Skin: Skin is warm and dry. Capillary refill takes less than 2 seconds. No rash noted. He is not diaphoretic. No erythema.   Psychiatric: He has a normal mood and affect. His behavior is normal. Judgment and thought content normal.   Nursing note and vitals reviewed.      Significant Labs:   BMP:   Recent Labs  Lab 06/12/18  0548   GLU 93      K 4.1      CO2 27   BUN 12   CREATININE 0.8   CALCIUM 9.1     CBC:   Recent Labs  Lab 06/11/18  0500   WBC 7.43   HGB 15.1   HCT 44.7        Significant Imaging: see below    Echo: 1 - Right heart size is normal with Normal RV systolic function.     2 - Mild tricuspid regurgitation.     3 - The estimated RV systolic pressure is 25 mmHg.     4 - Pulmonary valve not well seen; mild PI. Rao Maneuvar. Proximal PA not well seen.     5 - Normal left ventricular systolic function (EF 55-60%).     6 - Normal left ventricular diastolic function.     7 - On agitated saline injection, there is opacification of the right heart and then LV, without opacification of the LA. Consistent with known LV to RA shunt.     8 - Mild aortic regurgitation.     9 - Descending aorta with peak velocity of 2.1m/sec with stent noted..     CTA: 1.  Status post surgical correction of D transposition of the great arteries with arterial switch (Rao procedure).  No stenosis identified in the  reimplanted coronary arteries.    2.  Possible thickening of the anterior leaflet of the mitral valve; please correlate with echocardiography.    3.  I suspect perimembranous VSD patch.    4.  Measurements are provided for the aorta and pulmonary arteries.  Stent in the proximal descending aorta represents treatment for coarctation.    5.  Arch and isthmus are incompletely included on this study diverted to assessment for coronary artery anatomy and caliber.    EPS: pending     Assessment/Plan:      * Syncope    - Pediatric EP and adult EP have both seen and collaborating on patient care  - CTA to look at coronary anatomy 6/11/18  - EPS 6/12/18 pending findings           Adult congenital heart disease    His diagnoses are as follows:  1.  TGA/VSD s/p arterial switch procedure and VSD closure ([facility]. [facility])   2.  Coarctation stent (2012 -[facility]).  3.  Residual LV to RA shunt (1.4:1 shunt on cath 2007, very small shunt noted on 2012 cardiac catheterization)  4.  Normal coronaries on cath 2007  5.  Mild aortic insufficiency and aortic root enlargement  6.  Likely very mild branch pulmonary artery stenosis  7.  Likely PFO (cath 2007)  8.  Mildly abnormal appearing tricuspid valve without significant tricuspid insufficiency or stenosis          Essential hypertension    - related to his congenital heart disease.   - continue lisinopril           Chest tightness or pressure    - during episode of sinus tachycardia, ECG without changes or ischemia   - troponin on admit < 0.006  - Normal coronaries [facility] 2007, going for cardiac CTA to look at coronary anatomy           Transposition of great arteries, corrected    - followed by Dr. Patrick Godfrey Adult congenital            VTE Risk Mitigation         Ordered     IP VTE LOW RISK PATIENT  Once      06/09/18 0602              Gaby Miles NP  Department of Hospital Medicine   [facility]  Spectra:  [phone]  Pager: [phone]

## 2018-06-13 ENCOUNTER — TELEPHONE (OUTPATIENT)
Dept: ELECTROPHYSIOLOGY | Facility: CLINIC | Age: 23
End: 2018-06-13

## 2018-06-13 VITALS
RESPIRATION RATE: 18 BRPM | SYSTOLIC BLOOD PRESSURE: 126 MMHG | HEIGHT: 69 IN | BODY MASS INDEX: 18.71 KG/M2 | OXYGEN SATURATION: 98 % | DIASTOLIC BLOOD PRESSURE: 65 MMHG | TEMPERATURE: 98 F | HEART RATE: 91 BPM | WEIGHT: 126.31 LBS

## 2018-06-13 DIAGNOSIS — R55 SYNCOPE, UNSPECIFIED SYNCOPE TYPE: ICD-10-CM

## 2018-06-13 DIAGNOSIS — Z95.9 PRESENCE OF CARDIAC AND VASCULAR IMPLANT AND GRAFT: Primary | ICD-10-CM

## 2018-06-13 LAB
ANION GAP SERPL CALC-SCNC: 7 MMOL/L
BUN SERPL-MCNC: 13 MG/DL
CALCIUM SERPL-MCNC: 8.5 MG/DL
CHLORIDE SERPL-SCNC: 108 MMOL/L
CO2 SERPL-SCNC: 25 MMOL/L
CREAT SERPL-MCNC: 0.7 MG/DL
EST. GFR  (AFRICAN AMERICAN): >60 ML/MIN/1.73 M^2
EST. GFR  (NON AFRICAN AMERICAN): >60 ML/MIN/1.73 M^2
GLUCOSE SERPL-MCNC: 89 MG/DL
POTASSIUM SERPL-SCNC: 3.8 MMOL/L
SODIUM SERPL-SCNC: 140 MMOL/L

## 2018-06-13 PROCEDURE — 36415 COLL VENOUS BLD VENIPUNCTURE: CPT

## 2018-06-13 PROCEDURE — 99239 HOSP IP/OBS DSCHRG MGMT >30: CPT | Mod: ,,, | Performed by: NURSE PRACTITIONER

## 2018-06-13 PROCEDURE — 80048 BASIC METABOLIC PNL TOTAL CA: CPT

## 2018-06-13 PROCEDURE — 99232 SBSQ HOSP IP/OBS MODERATE 35: CPT | Mod: ,,, | Performed by: PEDIATRICS

## 2018-06-13 PROCEDURE — 25000003 PHARM REV CODE 250: Performed by: HOSPITALIST

## 2018-06-13 RX ADMIN — ACETAMINOPHEN 650 MG: 325 TABLET ORAL at 07:06

## 2018-06-13 RX ADMIN — LISINOPRIL 5 MG: 5 TABLET ORAL at 09:06

## 2018-06-13 NOTE — NURSING TRANSFER
Nursing Transfer Note      6/12/2018     Transfer to: [id number]     Transfer via: Stretcher    Transfer with: Cardiac Monitor in place; IVF on Dialflow    Transported by: RN x2     Medicines sent: N/A    Chart send with patient: Yes    Notified: Report called to LEANDER Sauer    Patient reassessed at: 2000

## 2018-06-13 NOTE — PLAN OF CARE
Patient is stable s/p normal EPS and ILR implant  To follow up with Peds Cardiology as o/p  EP will sign off    Tim Crow MD  PGY 4 Cardiology  [phone]

## 2018-06-13 NOTE — ASSESSMENT & PLAN NOTE
- followed by Dr. Patrick Godfrey Adult congenital, Dr. Godfrey will see at wound check / device check

## 2018-06-13 NOTE — ASSESSMENT & PLAN NOTE
- Pediatric EP and adult EP have both seen and collaborating on patient care  - CTA looked at coronary anatomy 6/11/18  - EPS 6/12/18 normal

## 2018-06-13 NOTE — NURSING TRANSFER
Nursing Transfer Note      6/12/2018     Transfer From: PACU    Transfer via stretcher    Transfer with cardiac monitoring    Transported by PACU    Medicines sent: N/A    Chart send with patient: Yes    Notified: Mother    Patient reassessed at:  6/12/18, 2047    Upon arrival to floor: cardiac monitor applied, patient oriented to room, call bell in reach and bed in lowest position

## 2018-06-13 NOTE — DISCHARGE SUMMARY
[facility] Hospital Medicine  Discharge Summary      Patient Name: Livia May  MRN: [id number]  Admission Date: 6/9/2018  Hospital Length of Stay: 3 days  Discharge Date and Time:  06/13/2018 11:56 AM  Attending Physician: Dr. Pb Garcia    Discharging Provider: Gaby Miles NP  Primary Care Provider: Isaías Lambert MD  [facility]: [facility] Gaby Miles NP    HPI:   Livia May is a 22 y.o. male who presents for follow-up of complex congenital heart disease who has been followed by Dr. Patrick Godfrey in the Adult Cardiology clinic here.  To summarize, his diagnoses are as follows:  1.  TGA/VSD s/p arterial switch procedure and [facility] closure ([facility])   2.  Coarctation stent (2012 -[facility]).  3.  Residual LV to RA shunt (1.4:1 shunt on cath 2007, very small shunt noted on 2012 cardiac catheterization)  4.  Normal coronaries on cath 2007  5.  Mild aortic insufficiency and aortic root enlargement  6.  Likely very mild branch pulmonary artery stenosis  7.  Likely PFO (cath 2007)  8.  Mildly abnormal appearing tricuspid valve without significant tricuspid insufficiency or stenosis     HPI:  He was doing well until earlier yesterday around 6 pm when  suddenly he became dizzy and his vision became blurry while trimming his nails standing up in the kitchen. He turned pale and diaphoretic, then passed out while his mother was there with him. Mother caught him and lay him to the floor to prevent the fall.  She was unable to feel a pulse at the time.  In the past he has had a holter which showed SVT.  She activated EMS and he slowly gained consciousness prior to EMS arrival. By the time he arrived at the ED he was back to baseline.  The pediatric Cardiologist there felt he needed to come to [facility], so cardiology was contacted and is willing to assist in his care. He has annual clinic visit coming up with Dr. Godfrey in august.      VS: /77, P 88, RR 13, RA  100%, T98.0F     LABS: BMP - NML, CBC - NML, TROP - NEGATIVE, BNP - NML     RADIOGRAPHS:  CXR: no acute findings     EKG: RBBB with no changes from old, and no ischemic changes     Currently he is asymptomatic. Conversant, AAO X 4. Mother at bedside reports he looks back to his normal self.        Procedure(s) (LRB):  ABLATION (N/A)  STUDY-EP (N/A)      Hospital Course:   Admitted to hospital medicine for witnessed syncopal episode.  He revived on his own.   Congenital cards, and pediatric EP were consulted as he has h/o NSVT (5 beats on a holter), he also was getting chest tightness with sinus tachycardia, which he states he normally doesn't get.  He was seen by Dr. Arvind Acuña from pediatric EP who believes this was a vasovagal episode, however with his anatomy, should have a full EPS.  Dr Ley was contacted to perform procedure as Dr. Acuña will be out of town.  Dr. Ley stated his DDx of syncope includes vasovagal, bradycardia (especially given RBBB/LPFB), VT/VF (though recovered without CPR or electrical cardioversion). Also with hx of nonsustained atrial arrhythmias, by report, on prior monitoring.  S/p Echo and CTA results below.  EPS 6/12/18 normal, s/p ILR.  Recovered well o/n, no issues post procedure.     Dispo: f/u pediatric EP and Dr. Patrick Godfrey with congenital cardiology, no home needs, mom is an ED NP       Consults:   Consults         Status Ordering Provider     Inpatient consult to Electrophysiology  Once     Provider:  (Not yet assigned)    Completed YARELI MONTE     Inpatient consult to Electrophysiology  Once     Provider:  Octaviano Ley MD    Completed DRAKE VELAZQUEZ     Inpatient consult to Pediatric Cardiology  Once     Provider:  Patrick Godfrey MD    Completed DRAKE VELAZQUEZ      Review of Systems   Constitutional: Negative for fatigue (resolved). Negative for activity change, appetite change, chills and fever.   HENT: Negative for congestion, sore throat and  trouble swallowing.    Eyes: Negative for redness and visual disturbance.   Respiratory: Negative for cough, shortness of breath and wheezing.    Cardiovascular: Negative for chest pain, palpitations and leg swelling.   Gastrointestinal: Negative for abdominal pain, constipation, diarrhea, nausea and vomiting.   Endocrine: Negative for cold intolerance and heat intolerance.   Genitourinary: Negative for decreased urine volume, difficulty urinating and hematuria.   Musculoskeletal: Negative for back pain and myalgias.   Skin: Negative for color change, rash and wound.   Allergic/Immunologic: Negative for immunocompromised state.   Neurological: Negative for dizziness (resolved) and light-headedness (resolved). Negative for weakness, numbness and headaches.   Hematological: Does not bruise/bleed easily.   Psychiatric/Behavioral: Negative for agitation, decreased concentration and sleep disturbance. The patient is no longer nervous/anxious.      Physical Exam   Constitutional: He is oriented to person, place, and time. He appears well-developed and well-nourished. No distress.   HENT:   Head: Normocephalic and atraumatic.   Right Ear: External ear normal.   Left Ear: External ear normal.   Nose: Nose normal.   Eyes: Conjunctivae and EOM are normal.   Neck: Normal range of motion. Neck supple. No JVD present.   Cardiovascular: Normal rate, regular rhythm and intact distal pulses.    Murmur (high pitched blowing and low pitched rumbling) heard.  Pulmonary/Chest: Effort normal and breath sounds normal. No respiratory distress. He has no wheezes. He has no rales.   Abdominal: Soft. Bowel sounds are normal. He exhibits no distension and no mass. There is no tenderness. There is no guarding.   Musculoskeletal: Normal range of motion. He exhibits no edema.   Neurological: He is alert and oriented to person, place, and time. No cranial nerve deficit or sensory deficit. Coordination normal.   Skin: Skin is warm and dry.  Capillary refill takes less than 2 seconds. No rash noted. He is not diaphoretic. No erythema.   Psychiatric: He has a normal mood and affect. His behavior is normal. Judgment and thought content normal.     * Vasovagal syncope    - Internal loop recorder placed   - f/u in EP clinic for wound check and device check /Dr. Godfrey will come see him at that visit          Syncope    - Pediatric EP and adult EP have both seen and collaborating on patient care  - CTA looked at coronary anatomy 6/11/18  - EPS 6/12/18 normal           Adult congenital heart disease    His diagnoses are as follows:  1.  TGA/VSD s/p arterial switch procedure and VSD closure ([facility]. [facility])   2.  Coarctation stent (2012 -[facility]).  3.  Residual LV to RA shunt (1.4:1 shunt on cath 2007, very small shunt noted on 2012 cardiac catheterization)  4.  Normal coronaries on cath 2007  5.  Mild aortic insufficiency and aortic root enlargement  6.  Likely very mild branch pulmonary artery stenosis  7.  Likely PFO (cath 2007)  8.  Mildly abnormal appearing tricuspid valve without significant tricuspid insufficiency or stenosis          Essential hypertension    - related to his congenital heart disease.   - continue lisinopril           Chest tightness or pressure    - during episode of sinus tachycardia, ECG without changes or ischemia   - troponin on admit < 0.006  - Normal coronaries [facility] 2007, going for cardiac CTA to look at coronary anatomy           Transposition of great arteries, corrected    - followed by Dr. Patrick Godfrey Adult congenital, Dr. Godfrey will see at wound check / device check             Final Active Diagnoses:    Diagnosis Date Noted POA    PRINCIPAL PROBLEM:  Vasovagal syncope [R55] 06/13/2018 Yes    Syncope [R55] 06/09/2018 Yes    Adult congenital heart disease [Q24.9] 05/19/2016 Not Applicable    Essential hypertension [I10] 06/09/2018 Yes    Transposition of great arteries, corrected [Q20.5] 06/10/2018 Not Applicable     Chest tightness or pressure [R07.89] 06/10/2018 No      Problems Resolved During this Admission:    Diagnosis Date Noted Date Resolved POA       Discharged Condition: good    Disposition: Home or Self Care    Follow Up:  Follow-up Information     Call Patrick Godfrey MD.    Specialties:  Pediatric Cardiology, Cardiology  Why:  Dr Nathan office will call you with follow up appointment. Call them on Monday if you have not heard from them  Contact information:  [address]  [place] [id number]  [phone]             Call Octaviano Ley MD.    Specialties:  Cardiology, Electrophysiology  Why:  Dr Ley's office will call you on Monday for follow up Wound check. Call them on Monday if you have not heard from them.  Contact information:  [address]  [facility] [id number] [phone]                 Patient Instructions:     Ambulatory referral to Adult Congenital Cardiology Clinic   Referral Priority: Routine Referral Type: Consultation   Referral Reason: Specialty Services Required    Referred to Provider: PATRICK GODFREY Requested Specialty: Cardiology   Number of Visits Requested: 1      Diet Adult Regular     Activity as tolerated     Notify your health care provider if you experience any of the following:  temperature >100.4     Notify your health care provider if you experience any of the following:  persistent nausea and vomiting or diarrhea     Notify your health care provider if you experience any of the following:  severe uncontrolled pain     Notify your health care provider if you experience any of the following:  redness, tenderness, or signs of infection (pain, swelling, redness, odor or green/yellow discharge around incision site)     Notify your health care provider if you experience any of the following:  difficulty breathing or increased cough     Notify your health care provider if you experience any of the following:  severe persistent headache     Notify your health care  provider if you experience any of the following:  worsening rash     Notify your health care provider if you experience any of the following:  persistent dizziness, light-headedness, or visual disturbances     Notify your health care provider if you experience any of the following:  increased confusion or weakness     Notify your health care provider if you experience any of the following:         Significant Diagnostic Studies: Labs:   BMP:   Recent Labs  Lab 06/12/18  0548 06/13/18  0611   GLU 93 89    140   K 4.1 3.8    108   CO2 27 25   BUN 12 13   CREATININE 0.8 0.7   CALCIUM 9.1 8.5*       Pending Diagnostic Studies:     None         Medications:  Reconciled Home Medications:      Medication List      CHANGE how you take these medications    lisinopril 5 MG tablet  Commonly known as:  PRINIVIL,ZESTRIL  TAKE 1 TABLET BY MOUTH DAILY **May Cause Dizziness**  What changed:  Another medication with the same name was removed. Continue taking this medication, and follow the directions you see here.            Indwelling Lines/Drains at time of discharge:   Lines/Drains/Airways          No matching active lines, drains, or airways          Time spent on the discharge of patient: 32 minutes  Patient was seen and examined on the date of discharge and determined to be suitable for discharge.         Gaby Miles NP  Department of Hospital Medicine  [facility]  Spectra:  [phone]  Pager: [phone]

## 2018-06-13 NOTE — ASSESSMENT & PLAN NOTE
- Internal loop recorder placed   - f/u in EP clinic for wound check and device check /Dr. Godfrey will come see him at that visit

## 2018-06-13 NOTE — ASSESSMENT & PLAN NOTE
- during episode of sinus tachycardia, ECG without changes or ischemia   - troponin on admit < 0.006  - Normal coronaries [facility] 2007, going for cardiac CTA to look at coronary anatomy

## 2018-06-13 NOTE — SUBJECTIVE & OBJECTIVE
Interval History: EP study yesterday looked great.  Tolerated procedure well.  No with implantable loop.    Objective:     Vital Signs (Most Recent):  Temp: 98.1 °F (36.7 °C) (06/13/18 0747)  Pulse: 90 (06/13/18 0747)  Resp: 16 (06/13/18 0747)  BP: (!) 124/59 (06/13/18 0747)  SpO2: 96 % (06/13/18 0747) Vital Signs (24h Range):  Temp:  [96.8 °F (36 °C)-98.7 °F (37.1 °C)] 98.1 °F (36.7 °C)  Pulse:  [] 90  Resp:  [11-18] 16  SpO2:  [95 %-100 %] 96 %  BP: ()/(53-59) 124/59     Weight: 57.3 kg (126 lb 5.2 oz)  Body mass index is 18.65 kg/m².     SpO2: 96 %  O2 Device (Oxygen Therapy): room air    Intake/Output - Last 3 Shifts       06/11 0700 - 06/12 0659 06/12 0700 - 06/13 0659 06/13 0700 - 06/14 0659    P.O. 420 240     I.V. (mL/kg)  1525 (26.6)     Total Intake(mL/kg) 420 (7.3) 1765 (30.8)     Urine (mL/kg/hr) 1565 (1.1) 200 (0.1)     Stool 0 (0) 2 (0)     Total Output 1565 202      Net -1145 +1563             Urine Occurrence  1 x           Lines/Drains/Airways     Peripheral Intravenous Line                 Peripheral IV - Single Lumen 06/12/18 1438 Right Hand less than 1 day              Scheduled Medications:    lisinopril  5 mg Oral Daily     Continuous Medications:    sodium chloride 0.9% 1,000 mL (06/12/18 1900)     PRN Medications: acetaminophen, dextrose 50%, dextrose 50%, glucagon (human recombinant), glucose, glucose, metoprolol, ondansetron, sodium chloride 0.9%    Physical Exam     In general, he is a very healthy-appearing, thin, nondysmorphic male in no apparent distress.  The eyes, nares, and oropharynx are clear.  Eyelids and conjunctiva are normal without drainage or erythema.  Pupils equal and round bilaterally.  The head is normocephalic and atraumatic.  The neck is supple without jugular venous distention or thyroid enlargement.  The lungs are clear to auscultation bilaterally.  There is a well healed sternotomy.  Loop recorder site dressing looks good.  The first heart sound is  normal.  The second is fixed and split.  There is a 2/6 harsh early systolic murmur at the LLSB and apex.  The abdominal exam is benign without hepatosplenomegaly, tenderness, or distention.  Pulses are normal in all 4 extremities with brisk capillary refill and no clubbing, cyanosis, or edema.  No rashes are noted.    Significant Labs: Labs reviewed.  No significant abnormalities.

## 2018-06-13 NOTE — PLAN OF CARE
Pt AAOx4. Mild complaints of soreness to right groin site. PRN pain medication administered as ordered. Dressing remains CDI to groin site and left mid chest. Pulses 2+ bilaterally. VSS. Denies nausea. Tolerating sips of water. Safety maintained.

## 2018-06-13 NOTE — ASSESSMENT & PLAN NOTE
1.  TGA/VSD s/p arterial switch procedure and VSD closure (Dr. Raygoza)   2.  Coarctation stent (2012 -[facility]).  3.  Residual LV to RA shunt (1.4:1 shunt on cath 2007, 1.3:1 noted on 2012 cardiac catheterization)  4.  Normal coronaries on cath 2007  5.  mild aortic insufficiency and aortic root enlargement  6.  Likely very mild branch pulmonary artery stenosis  7.  Likely PFO (cath 2007)  8.  Mildly abnormal appearing tricuspid valve without significant tricuspid insufficiency or stenosis  9.  Nonsustained SVT noted on holter 2/2018  10.  Syncope - likely vasovagal, very reassuring EP study 6/12/18.  Implantable loop placed 6/12/18.  11.  Right to left bubble study - unclear source of shunt.    Recommendations:  1.  OK to discharge home.   2.  Wound check follow up as per EP.  I can see him the same day.  3.  If he does well, I will see him again next summer.  4.  At home, increase fluid intake, lay down if prodromal symptoms develop.  5.  We will present him at our [facility] conference next week, especially regarding the positive bubble study.  I jp add a baby aspirin to his meds after that discussion.

## 2018-06-13 NOTE — PROGRESS NOTES
Pt received on stretcher, vitals were stable. Pt didn't complain of any pain at this time. Right groin site was stable, intact, dressing clean, dry and intact. No signs of hematoma, bleeding, or bruising. Bilateral pulses were intact, 2+. No complaints of any numbness or tingling. Right leg is warm.     Vitals taken Q15 for one hour on arrival. Pt off bedrest at 9:15, will be able to eat as well. Will continue to monitor.

## 2018-06-13 NOTE — TELEPHONE ENCOUNTER
Called to book loop/wound check. Spoke to pt's mother who states Dr. Godfrey wanted to see pt on 6/27/18 at 1pm.  Nothing scheduled of this in EPIC.  Scheduled pt in device clinic for 2:20pm.  They will call clinic if they need to reschedule appt.

## 2018-06-13 NOTE — PLAN OF CARE
06/13/18 1226   Final Note   Assessment Type Final Discharge Note   Discharge Disposition Home   Hospital Follow Up  Appt(s) scheduled? (Dr Godfrey and EP to contact the pt for follow up appointments.)

## 2018-06-13 NOTE — PLAN OF CARE
Problem: Patient Care Overview  Goal: Plan of Care Review  Outcome: Outcome(s) achieved Date Met: 06/13/18  Pt discharged per MD orders.  Tele and IV discontinued.  Catheter tip intact x1.  Medication list and prescriptions reviewed. Pt verbalizes understanding of all written and verbal discharge instructions.  Pt awaiting family arrival.  Will continue to monitor.

## 2018-06-13 NOTE — ANESTHESIA POSTPROCEDURE EVALUATION
"Anesthesia Post Evaluation    Patient: Livia May    Procedure(s) Performed: Procedure(s) (LRB):  ABLATION (N/A)  STUDY-EP (N/A)    Final Anesthesia Type: general  Patient location during evaluation: telemetry step down  Patient participation: Yes- Able to Participate  Level of consciousness: awake and alert  Post-procedure vital signs: reviewed and stable  Pain management: adequate  Airway patency: patent  PONV status at discharge: No PONV  Anesthetic complications: no      Cardiovascular status: hemodynamically stable  Respiratory status: unassisted  Hydration status: euvolemic  Follow-up not needed.        Visit Vitals  BP (!) 124/59 (Patient Position: Sitting)   Pulse 90   Temp 36.7 °C (98.1 °F) (Oral)   Resp 16   Ht 5' 9" (1.753 m)   Wt 57.3 kg (126 lb 5.2 oz)   SpO2 96%   BMI 18.65 kg/m²       Pain/Shahida Score: Pain Assessment Performed: Yes (6/13/2018  7:02 AM)  Presence of Pain: complains of pain/discomfort (6/13/2018  7:02 AM)  Pain Rating Prior to Med Admin: 4 (6/13/2018  7:13 AM)  Pain Rating Post Med Admin: 0 (6/12/2018 10:30 PM)  Shahida Score: 10 (6/12/2018  7:15 PM)      "

## 2018-06-13 NOTE — PROGRESS NOTES
[facility]  Pediatric Cardiology  Progress Note    Patient Name: Livia May  MRN: [id number]  Admission Date: 6/9/2018  Hospital Length of Stay: 3 days  Code Status: Full Code   Attending Physician: Pb Garcia MD   Primary Care Physician: Isaías Lambert MD  Expected Discharge Date: 6/14/2018  Principal Problem:Syncope    Subjective:     Interval History: EP study yesterday looked great.  Tolerated procedure well.  No with implantable loop.    Objective:     Vital Signs (Most Recent):  Temp: 98.1 °F (36.7 °C) (06/13/18 0747)  Pulse: 90 (06/13/18 0747)  Resp: 16 (06/13/18 0747)  BP: (!) 124/59 (06/13/18 0747)  SpO2: 96 % (06/13/18 0747) Vital Signs (24h Range):  Temp:  [96.8 °F (36 °C)-98.7 °F (37.1 °C)] 98.1 °F (36.7 °C)  Pulse:  [] 90  Resp:  [11-18] 16  SpO2:  [95 %-100 %] 96 %  BP: ()/(53-59) 124/59     Weight: 57.3 kg (126 lb 5.2 oz)  Body mass index is 18.65 kg/m².     SpO2: 96 %  O2 Device (Oxygen Therapy): room air    Intake/Output - Last 3 Shifts       06/11 0700 - 06/12 0659 06/12 0700 - 06/13 0659 06/13 0700 - 06/14 0659    P.O. 420 240     I.V. (mL/kg)  1525 (26.6)     Total Intake(mL/kg) 420 (7.3) 1765 (30.8)     Urine (mL/kg/hr) 1565 (1.1) 200 (0.1)     Stool 0 (0) 2 (0)     Total Output 1565 202      Net -1145 +1563             Urine Occurrence  1 x           Lines/Drains/Airways     Peripheral Intravenous Line                 Peripheral IV - Single Lumen 06/12/18 1438 Right Hand less than 1 day              Scheduled Medications:    lisinopril  5 mg Oral Daily     Continuous Medications:    sodium chloride 0.9% 1,000 mL (06/12/18 1900)     PRN Medications: acetaminophen, dextrose 50%, dextrose 50%, glucagon (human recombinant), glucose, glucose, metoprolol, ondansetron, sodium chloride 0.9%    Physical Exam     In general, he is a very healthy-appearing, thin, nondysmorphic male in no apparent distress.  The eyes, nares, and oropharynx are clear.  Eyelids and  conjunctiva are normal without drainage or erythema.  Pupils equal and round bilaterally.  The head is normocephalic and atraumatic.  The neck is supple without jugular venous distention or thyroid enlargement.  The lungs are clear to auscultation bilaterally.  There is a well healed sternotomy.  Loop recorder site dressing looks good.  The first heart sound is normal.  The second is fixed and split.  There is a 2/6 harsh early systolic murmur at the LLSB and apex.  The abdominal exam is benign without hepatosplenomegaly, tenderness, or distention.  Pulses are normal in all 4 extremities with brisk capillary refill and no clubbing, cyanosis, or edema.  No rashes are noted.    Significant Labs: Labs reviewed.  No significant abnormalities.        Assessment and Plan:     Cardiac/Vascular   Adult congenital heart disease    1.  [facility]   2.  Coarctation stent (2012 -[facility]).  3.  Residual LV to RA shunt (1.4:1 shunt on cath 2007, 1.3:1 noted on 2012 cardiac catheterization)  4.  Normal coronaries on cath 2007  5.  mild aortic insufficiency and aortic root enlargement  6.  Likely very mild branch pulmonary artery stenosis  7.  Likely PFO (cath 2007)  8.  Mildly abnormal appearing tricuspid valve without significant tricuspid insufficiency or stenosis  9.  Nonsustained SVT noted on holter 2/2018  10.  Syncope - likely vasovagal, very reassuring EP study 6/12/18.  Implantable loop placed 6/12/18.  11.  Right to left bubble study - unclear source of shunt.    Recommendations:  1.  OK to discharge home.   2.  Wound check follow up as per EP.  I can see him the same day.  3.  If he does well, I will see him again next summer.  4.  At home, increase fluid intake, lay down if prodromal symptoms develop.  5.  We will present him at our [facility] conference next week, especially regarding the positive bubble study.  I jp add a baby aspirin to his meds after that  discussion.            Patrick Godfrey MD  Pediatric Cardiology  [facility]

## 2018-06-15 ENCOUNTER — PATIENT OUTREACH (OUTPATIENT)
Dept: ADMINISTRATIVE | Facility: CLINIC | Age: 23
End: 2018-06-15

## 2018-06-15 DIAGNOSIS — R55 VASOVAGAL SYNCOPE: ICD-10-CM

## 2018-06-15 DIAGNOSIS — Z95.818 STATUS POST PLACEMENT OF IMPLANTABLE LOOP RECORDER: Primary | ICD-10-CM

## 2018-06-15 NOTE — PATIENT INSTRUCTIONS
Fainting: Vagal Reaction  Fainting (syncope) is a temporary loss of consciousness that is associated with a loss of postural tone. Its also called passing out. It occurs when blood flow to the brain is less than normal. Your healthcare provider believes that your fainting was because of a vagal reaction. This condition is not a sign of serious disease.  A vagal reaction is a response in your body that causes your pulse to slow down or the blood vessels to expand. This causes your blood pressure to fall. And this sends less blood to your brain if you are standing or sitting. That results in dizziness, near-fainting, or fainting. Lying down usually stops the reaction within 60 seconds.  This response can occur during sudden fear, severe pain, emotional stress, overexertion, overheating, hunger, nausea or vomiting, prolonged standing, or standing up after sitting or lying for a long time.  Home care  Follow these guidelines when caring for yourself at home:  · Rest today. Go back to your normal activities as soon as you are feeling back to normal.  · Stay hydrated and avoid skipping meals.  · If you feel lightheaded or dizzy, lie down right away. Or sit with your head lowered between your knees.  Follow-up care  Follow up with your healthcare provider, or as advised.  When to seek medical advice  Call your healthcare provider right away if any of these occur:  · Another fainting spell thats not explained by the common causes listed above  · Pain in your chest, arm, neck, jaw, back, or abdomen  · Shortness of breath  · Severe headache or seizure  · Your heart beats very rapidly, very slowly, or irregularly (palpitations)  Date Last Reviewed: 12/1/2016 © 2000-2017 [employer]. [address] [place] [address], [place] [address]. All rights reserved. This information is not intended as a substitute for professional medical care. Always follow your healthcare professional's instructions.

## 2018-06-18 ENCOUNTER — TELEPHONE (OUTPATIENT)
Dept: PEDIATRIC CARDIOLOGY | Facility: CLINIC | Age: 23
End: 2018-06-18

## 2018-06-18 NOTE — TELEPHONE ENCOUNTER
----- Message from Guerline Knowles sent at 6/18/2018  9:59 AM CDT -----  Contact: Efe Albright [phone]  Same Day Appointment Request    Was an appointment with another provider offered?   N/A    Reason for FST appt.: hospital f/u      Communication Preference: Efe Albright [phone]    Additional Information:    Mom is trying to see why the appt on 06/27 was cancelled and also see if the pt can still be seen on that day for the hospital follow-up because they are trying to coordinate this appt with another appt that same day. Mom is requesting a call back

## 2018-06-18 NOTE — TELEPHONE ENCOUNTER
Spoke to mom and explained reason for prior cancellation as pt was still admitted per Dr. Godfrey. Added appt for 6/17 to follow Dr. Ley. She verbalized understanding.

## 2018-06-21 ENCOUNTER — TELEPHONE (OUTPATIENT)
Dept: ELECTROPHYSIOLOGY | Facility: CLINIC | Age: 23
End: 2018-06-21

## 2018-06-21 NOTE — TELEPHONE ENCOUNTER
Left voicemail for patient to inquire about the symptom transmission he sent today for his SJM Confirm Rx ILR.  Egram show probable NSR, no ectopy noted.  Will await patient's call back.

## 2018-06-27 ENCOUNTER — OFFICE VISIT (OUTPATIENT)
Dept: CARDIOLOGY | Facility: CLINIC | Age: 23
End: 2018-06-27
Payer: COMMERCIAL

## 2018-06-27 ENCOUNTER — CLINICAL SUPPORT (OUTPATIENT)
Dept: ELECTROPHYSIOLOGY | Facility: CLINIC | Age: 23
End: 2018-06-27
Attending: INTERNAL MEDICINE
Payer: COMMERCIAL

## 2018-06-27 VITALS
BODY MASS INDEX: 19.11 KG/M2 | SYSTOLIC BLOOD PRESSURE: 161 MMHG | DIASTOLIC BLOOD PRESSURE: 100 MMHG | WEIGHT: 129 LBS | HEART RATE: 103 BPM | OXYGEN SATURATION: 99 % | HEIGHT: 69 IN

## 2018-06-27 DIAGNOSIS — Q24.9 ADULT CONGENITAL HEART DISEASE: ICD-10-CM

## 2018-06-27 DIAGNOSIS — Z87.74 S/P COARCTATION STENT: ICD-10-CM

## 2018-06-27 DIAGNOSIS — Q20.5: ICD-10-CM

## 2018-06-27 DIAGNOSIS — Z95.828 S/P COARCTATION STENT: ICD-10-CM

## 2018-06-27 DIAGNOSIS — R55 VASOVAGAL SYNCOPE: Primary | ICD-10-CM

## 2018-06-27 DIAGNOSIS — Z98.890 PERSONAL HISTORY OF SURGERY TO HEART AND GREAT VESSELS, PRESENTING HAZARDS TO HEALTH: ICD-10-CM

## 2018-06-27 DIAGNOSIS — I10 ESSENTIAL HYPERTENSION: ICD-10-CM

## 2018-06-27 DIAGNOSIS — Z95.9 PRESENCE OF CARDIAC AND VASCULAR IMPLANT AND GRAFT: ICD-10-CM

## 2018-06-27 DIAGNOSIS — Q25.1 AORTIC COARCTATION: ICD-10-CM

## 2018-06-27 DIAGNOSIS — Q21.0 VSD (VENTRICULAR SEPTAL DEFECT): ICD-10-CM

## 2018-06-27 DIAGNOSIS — R55 SYNCOPE, UNSPECIFIED SYNCOPE TYPE: ICD-10-CM

## 2018-06-27 PROBLEM — R07.89 CHEST TIGHTNESS OR PRESSURE: Status: RESOLVED | Noted: 2018-06-10 | Resolved: 2018-06-27

## 2018-06-27 PROCEDURE — 99999 PR PBB SHADOW E&M-EST. PATIENT-LVL III: CPT | Mod: PBBFAC,,, | Performed by: PEDIATRICS

## 2018-06-27 PROCEDURE — 3078F DIAST BP <80 MM HG: CPT | Mod: CPTII,S$GLB,, | Performed by: PEDIATRICS

## 2018-06-27 PROCEDURE — 3008F BODY MASS INDEX DOCD: CPT | Mod: CPTII,S$GLB,, | Performed by: PEDIATRICS

## 2018-06-27 PROCEDURE — 3074F SYST BP LT 130 MM HG: CPT | Mod: CPTII,S$GLB,, | Performed by: PEDIATRICS

## 2018-06-27 PROCEDURE — 99213 OFFICE O/P EST LOW 20 MIN: CPT | Mod: S$GLB,,, | Performed by: PEDIATRICS

## 2018-06-28 NOTE — PROGRESS NOTES
2018    re:Livia May  :1995    [facility]    Isaías Lambert MD  [address] [facility] [address]        Patient ID:  Livia May is a 22 y.o. male who presents for follow-up of complex congenital heart disease.  To summarize, his diagnoses are as follows:  1.  TGA/VSD s/p arterial switch procedure and VSD closure (Dr. Raygoza)   2.  Coarctation stent ( -Claudio).  3.  Residual LV to RA shunt (1.4:1 shunt on cath , very small shunt noted on  cardiac catheterization)  4.  Normal coronaries on cath ,   5.  mild aortic insufficiency and aortic root enlargement  6.  Very mild branch pulmonary artery stenosis  7.  Likely PFO (cath )  8.  Mildly abnormal appearing tricuspid valve without significant tricuspid insufficiency or stenosis  9.  Syncope, 2018, likely vasovagal in origin.  Extensive workup negative.  Now status post implantable loop recorder 2018.  10.  Positive right to left bubble study.  Unclear if shunting at atrial or ventricular level.  No migraines or TIA symptoms.    My recommendations are as follows:  1.  Continue lisinopril although I would consider discontinuing this medication if he had more syncope.  2.  Follow up with me in 1 year with echocardiogram and EKG.  3.  Report any syncope immediately.  We would arrange an evaluation of his implantable loop recorder.  4.  Continue increased fluid intake.  Sit down immediately if he becomes lightheaded.  Otherwise, there is no need for activity restriction or endocarditis prophylaxis.  5.  Any migraines or TIA like symptoms would prompt consideration of a repeat cardiac catheterization with transesophageal echocardiogram and closure of his patent foramen ovale given the right to left bubble study.  A repeat bubble study would be obtained in the lab to make sure there was no ventricular level shunting although this is obviously extremely  unlikely.    Discussion:  His recent episode of syncope was almost definitely vasovagal in origin.  While in the hospital, he had an extensive workup.  A CT scan confirmed that the coronaries were without obstruction.  His echocardiogram was very reassuring although he did have evidence of right to left shunting with Valsalva.  An EP study was normal. An implantable loop recorder was placed.    History of present illness:  I last saw him while he was in the hospital earlier this month.  He had an episode of syncope while cutting his fingernails.  An extensive workup while in the hospital failed to show any evidence of new cardiac pathology, and an implantable loop recorder was placed although we strongly suspect vasovagal syncope.  He has done extremely well since that time. He is back to work.  He denies any chest pain, palpitations, syncope, near syncope, cyanosis, edema, or exercise intolerance since leaving the hospital.  The loop recorder site is healing well.      Review of Systems   Constitution: Negative.   HENT: Negative.    Eyes: Negative.    Cardiovascular: Negative.    Respiratory: Negative.    Endocrine: Negative.    Hematologic/Lymphatic: Negative.    Skin: Negative.    Musculoskeletal: Negative.    Gastrointestinal: Negative.    Genitourinary: Negative.    Neurological: Negative.    Psychiatric/Behavioral: Negative.    Allergic/Immunologic: Negative.      Past Medical History:   Diagnosis Date    Coarctation of aorta, congenital     Diaphragmatic paralysis     Strabismus     Syncope and collapse 06/2018    TGA/VSD (transposition of great arteries, ventricular septal defect)      Past Surgical History:   Procedure Laterality Date    ABLATION N/A 6/12/2018    Procedure: ABLATION;  Surgeon: Octaviano Ley MD;  Location: [facility] CATH LAB;  Service: Cardiology;  Laterality: N/A;  syncope/SVT, EPS, +/-RFA, +/-ICD, +/-PPM, +/-ILR, Anes AO, DM, 353A (peds congenital)    ARTERIAL SWITCH W/ VENTRICULAR  "SEPTAL DEFECT CLOSURE      CARDIAC CATHETERIZATION  10/11/07    CARDIAC CATHETERIZATION  1/17/12    CARDIAC ELECTROPHYSIOLOGY STUDY N/A 6/12/2018    Procedure: STUDY-EP;  Surgeon: Octaviano Ley MD;  Location: [facility] CATH LAB;  Service: Cardiology;  Laterality: N/A;    coarctation of aorta repair/with TGA switch      DIAPHRAGM PLICATION      LOOP RECORDER IMPLANT  06/12/2018    STRABISMUS SURGERY       Family History   Problem Relation Age of Onset    Asthma Brother     Congenital heart disease Neg Hx     Early death Neg Hx      Social History     Social History    Marital status: Single     Spouse name: N/A    Number of children: N/A    Years of education: N/A     Social History Main Topics    Smoking status: Never Smoker    Smokeless tobacco: Never Used    Alcohol use No    Drug use: No    Sexual activity: Not Asked     Other Topics Concern    None     Social History Narrative    Just graduated from [employer] with a degree in general studies.  Looking for a job.     Current Outpatient Prescriptions on File Prior to Visit   Medication Sig Dispense Refill    lisinopril (PRINIVIL,ZESTRIL) 5 MG tablet TAKE 1 TABLET BY MOUTH DAILY **May Cause Dizziness** 30 tablet 12     No current facility-administered medications on file prior to visit.      Review of patient's allergies indicates:  No Known Allergies    BP (!) 161/100 (BP Location: Right leg, Patient Position: Sitting, BP Method: Large (Automatic))   Pulse 103   Ht 5' 9" (1.753 m)   Wt 58.5 kg (128 lb 15.5 oz)   SpO2 99%   BMI 19.05 kg/m² RL BP 93/52       Objective:    Physical Exam   Constitutional: He is oriented to person, place, and time. He appears well-developed and well-nourished. No distress.   HENT:   Head: Normocephalic and atraumatic.   Right Ear: External ear normal.   Left Ear: External ear normal.   Nose: Nose normal.   Mouth/Throat: Oropharynx is clear and moist. No oropharyngeal exudate.   Eyes: Conjunctivae and EOM are " normal. Pupils are equal, round, and reactive to light. Right eye exhibits no discharge. Left eye exhibits no discharge. No scleral icterus.   Neck: Normal range of motion. Neck supple. No JVD present. No tracheal deviation present. No thyromegaly present.   Cardiovascular: Normal rate, regular rhythm, S1 normal, S2 normal and intact distal pulses.  Exam reveals no gallop and no friction rub.    Murmur heard.  High-pitched blowing early systolic murmur is present with a grade of 1/6  at the lower left sternal border  Pulses:       Carotid pulses are 2+ on the right side, and 2+ on the left side.       Radial pulses are 2+ on the right side, and 2+ on the left side.        Femoral pulses are 2+ on the right side, and 2+ on the left side.       Dorsalis pedis pulses are 2+ on the right side, and 2+ on the left side.   2/6 harsh, squeaky murmur at LLSB and apex   Pulmonary/Chest: Effort normal and breath sounds normal. No stridor. No respiratory distress. He has no wheezes. He has no rales. He exhibits no tenderness.   well healed sternotomy.  Loop recorder site healing well.   Abdominal: Soft. Bowel sounds are normal. He exhibits no distension and no mass. There is no tenderness. There is no rebound and no guarding.   Musculoskeletal: Normal range of motion. He exhibits no edema, tenderness or deformity.   Lymphadenopathy:     He has no cervical adenopathy.   Neurological: He is alert and oriented to person, place, and time. No cranial nerve deficit. He exhibits normal muscle tone. Coordination normal.   Skin: Skin is warm and dry. He is not diaphoretic.   Psychiatric: He has a normal mood and affect. His behavior is normal. Judgment and thought content normal.         No tests in clinic today.    Echo 6/11/18:  CONCLUSIONS     1 - Right heart size is normal with Normal RV systolic function.     2 - Mild tricuspid regurgitation.     3 - The estimated RV systolic pressure is 25 mmHg.     4 - Pulmonary valve not well  seen; mild PI. [place] Maneuvar. Proximal PA not well seen.     5 - Normal left ventricular systolic function (EF 55-60%).     6 - Normal left ventricular diastolic function.     7 - On agitated saline injection, there is opacification of the right heart and then LV, without opacification of the LA. Consistent with known LV to RA shunt.     8 - Mild aortic regurgitation.     9 - Descending aorta with peak velocity of 2.1m/sec with stent noted..     CTA June 2018:  1.  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  No stenosis identified in the reimplanted coronary arteries.  2.  Possible thickening of the anterior leaflet of the mitral valve; please correlate with echocardiography.  3.  I suspect perimembranous VSD patch.  4.  Measurements are provided for the aorta and pulmonary arteries.  Stent in the proximal descending aorta represents treatment for coarctation.  5.  Arch and isthmus are incompletely included on this study diverted to assessment for coronary artery anatomy and caliber.    EP study 6/12/18:  1. Not inducible for sustained VT or VF with up to 3 VES, from 2 different RV sites, in the baseline state or with isuprel infusion.  2. Normal AH, HV.  3. Normal CSNRT.  4. Normal response to procainamide challenge.  5. Not inducible for sustained SVT with up to double AES.  6. Successful placement, and later removal, of a right femoral arterial sheath for BP monitoring.    CPX 2016:  Moderate functional impairment associated with a borderline reduced breathing reserve, normal oxygen saturation, a good effort, and a borderline reduced AT. These findings are indicative of functional impairment secondary to deconditioning and possible   ventilatory impairment.  4) The PkVO2 was 29.9 ml/kg/min which is 62% of predicted equating to a functional capacity of 8.5 METS indicating moderate functional impairment    I reviewed the cardiac  catheterization report from January 12, 2012:  1.  A small residual left ventricle to right atrial shunt was noted.  2.  The coarctation was stented with a 26 x 10 Tito LD stent  3.  There was about a 14-16 mmHg gradient from the right ventricle into the branch pulmonary arteries    A cardiac catheterization in 2007 revealed normal coronary arteries.     Thank you for referring this patient to our clinic.  Please call with any questions.    Sincerely,        Patrick Godfrey MD  Pediatric Cardiology  Adult Congenital Heart Disease  Pediatric Heart Failure and Transplantation  [facility] [address] [place]  [address]  [phone]

## 2018-07-31 ENCOUNTER — CLINICAL SUPPORT (OUTPATIENT)
Dept: ELECTROPHYSIOLOGY | Facility: CLINIC | Age: 23
End: 2018-07-31
Attending: INTERNAL MEDICINE
Payer: COMMERCIAL

## 2018-07-31 DIAGNOSIS — R55 VASOVAGAL SYNCOPE: ICD-10-CM

## 2018-07-31 DIAGNOSIS — Z95.818 STATUS POST PLACEMENT OF IMPLANTABLE LOOP RECORDER: ICD-10-CM

## 2018-07-31 PROCEDURE — 93299 LOOP RECORDER REMOTE: CPT | Mod: S$GLB,,, | Performed by: INTERNAL MEDICINE

## 2018-07-31 PROCEDURE — 93298 REM INTERROG DEV EVAL SCRMS: CPT | Mod: S$GLB,,, | Performed by: INTERNAL MEDICINE

## 2018-09-04 ENCOUNTER — CLINICAL SUPPORT (OUTPATIENT)
Dept: ELECTROPHYSIOLOGY | Facility: CLINIC | Age: 23
End: 2018-09-04
Payer: COMMERCIAL

## 2018-09-04 DIAGNOSIS — Z95.818 STATUS POST PLACEMENT OF IMPLANTABLE LOOP RECORDER: ICD-10-CM

## 2018-09-04 DIAGNOSIS — R55 VASOVAGAL SYNCOPE: ICD-10-CM

## 2018-09-04 PROCEDURE — 93299 LOOP RECORDER REMOTE: CPT | Mod: S$GLB,,, | Performed by: INTERNAL MEDICINE

## 2018-09-04 PROCEDURE — 93298 REM INTERROG DEV EVAL SCRMS: CPT | Mod: S$GLB,,, | Performed by: INTERNAL MEDICINE

## 2018-09-08 DIAGNOSIS — R55 SYNCOPE AND COLLAPSE: Primary | ICD-10-CM

## 2018-09-12 ENCOUNTER — OFFICE VISIT (OUTPATIENT)
Dept: ELECTROPHYSIOLOGY | Facility: CLINIC | Age: 23
End: 2018-09-12
Payer: COMMERCIAL

## 2018-09-12 ENCOUNTER — HOSPITAL ENCOUNTER (OUTPATIENT)
Dept: CARDIOLOGY | Facility: CLINIC | Age: 23
Discharge: HOME | End: 2018-09-12
Payer: COMMERCIAL

## 2018-09-12 VITALS
WEIGHT: 127 LBS | BODY MASS INDEX: 18.81 KG/M2 | DIASTOLIC BLOOD PRESSURE: 55 MMHG | HEART RATE: 81 BPM | HEIGHT: 69 IN | SYSTOLIC BLOOD PRESSURE: 110 MMHG

## 2018-09-12 DIAGNOSIS — I10 ESSENTIAL HYPERTENSION: ICD-10-CM

## 2018-09-12 DIAGNOSIS — Q24.9 ADULT CONGENITAL HEART DISEASE: ICD-10-CM

## 2018-09-12 DIAGNOSIS — R55 SYNCOPE AND COLLAPSE: ICD-10-CM

## 2018-09-12 DIAGNOSIS — Q21.0 VSD (VENTRICULAR SEPTAL DEFECT): Primary | ICD-10-CM

## 2018-09-12 DIAGNOSIS — Q25.1 AORTIC COARCTATION: ICD-10-CM

## 2018-09-12 DIAGNOSIS — R55 VASOVAGAL SYNCOPE: ICD-10-CM

## 2018-09-12 PROCEDURE — 99999 PR PBB SHADOW E&M-EST. PATIENT-LVL III: CPT | Mod: PBBFAC,,, | Performed by: INTERNAL MEDICINE

## 2018-09-12 PROCEDURE — 3074F SYST BP LT 130 MM HG: CPT | Mod: CPTII,S$GLB,, | Performed by: INTERNAL MEDICINE

## 2018-09-12 PROCEDURE — 3008F BODY MASS INDEX DOCD: CPT | Mod: CPTII,S$GLB,, | Performed by: INTERNAL MEDICINE

## 2018-09-12 PROCEDURE — 99214 OFFICE O/P EST MOD 30 MIN: CPT | Mod: S$GLB,,, | Performed by: INTERNAL MEDICINE

## 2018-09-12 PROCEDURE — 93000 ELECTROCARDIOGRAM COMPLETE: CPT | Mod: S$GLB,,, | Performed by: INTERNAL MEDICINE

## 2018-09-12 PROCEDURE — 3078F DIAST BP <80 MM HG: CPT | Mod: CPTII,S$GLB,, | Performed by: INTERNAL MEDICINE

## 2018-09-12 NOTE — PROGRESS NOTES
Subjective:    Patient ID:  Livia May is a 23 y.o. male who presents for evaluation of Loss of Consciousness      HPI   23 y.o. M  TGA and VSD, s/p arterial switch and VSD closure, with residual LV->RA shunt  Ao coarctation -> stented 2012    Had syncope, thought vasovagal (during fingernail clipping).  Extensive workup, including EPS 6/18, unrevealing. ILR placed out of abundance of caution.  No recurrence. Feels well.    ILR: SR.    My interpretation of today's ECG is NSR. RBBB.    Review of Systems   Constitution: Negative. Negative for weakness and malaise/fatigue.   HENT: Negative.  Negative for ear pain and tinnitus.    Eyes: Negative for blurred vision.   Cardiovascular: Negative.  Negative for chest pain, dyspnea on exertion, near-syncope, palpitations and syncope.   Respiratory: Negative.  Negative for shortness of breath.    Endocrine: Negative.  Negative for polyuria.   Hematologic/Lymphatic: Does not bruise/bleed easily.   Skin: Negative.  Negative for rash.   Musculoskeletal: Negative.  Negative for joint pain and muscle weakness.   Gastrointestinal: Negative.  Negative for abdominal pain and change in bowel habit.   Genitourinary: Negative for frequency.   Neurological: Negative.  Negative for dizziness.   Psychiatric/Behavioral: Negative.  Negative for depression. The patient is not nervous/anxious.    Allergic/Immunologic: Negative for environmental allergies.        Objective:    Physical Exam   Constitutional: He is oriented to person, place, and time. He appears well-developed and well-nourished.   HENT:   Head: Normocephalic and atraumatic.   Eyes: Conjunctivae, EOM and lids are normal. No scleral icterus.   Neck: Normal range of motion. No JVD present. No tracheal deviation present. No thyromegaly present.   Cardiovascular: Normal rate, regular rhythm and intact distal pulses.  No extrasystoles are present. PMI is not displaced. Exam reveals no gallop and no friction rub.   Murmur  heard.  High-pitched blowing holosystolic murmur is present with a grade of 2/6 at the apex.  Pulses:       Radial pulses are 2+ on the right side, and 2+ on the left side.   Pulmonary/Chest: Effort normal and breath sounds normal. No accessory muscle usage. No tachypnea. No respiratory distress. He has no wheezes. He has no rales.   Abdominal: Soft. Bowel sounds are normal. He exhibits no distension. There is no hepatosplenomegaly. There is no tenderness.   Musculoskeletal: Normal range of motion. He exhibits no edema.   Neurological: He is alert and oriented to person, place, and time. He has normal reflexes. He exhibits normal muscle tone.   Skin: Skin is warm and dry. No rash noted.   Psychiatric: He has a normal mood and affect. His behavior is normal.   Nursing note and vitals reviewed.        Assessment:       1. VSD (ventricular septal defect)    2. Aortic coarctation    3. Adult congenital heart disease    4. Essential hypertension    5. Vasovagal syncope         Plan:       Doing well s/p ILR. No syncope, no arrhythmia.  f/u with primary cardiologist Dr Epifanio Alvarenga in 1 year, or earlier prn.

## 2018-10-08 ENCOUNTER — CLINICAL SUPPORT (OUTPATIENT)
Dept: ELECTROPHYSIOLOGY | Facility: CLINIC | Age: 23
End: 2018-10-08
Attending: INTERNAL MEDICINE
Payer: COMMERCIAL

## 2018-10-08 DIAGNOSIS — R55 VASOVAGAL SYNCOPE: ICD-10-CM

## 2018-10-08 DIAGNOSIS — Z95.818 STATUS POST PLACEMENT OF IMPLANTABLE LOOP RECORDER: ICD-10-CM

## 2018-10-08 PROCEDURE — 93299 LOOP RECORDER REMOTE: CPT | Mod: S$GLB,,, | Performed by: INTERNAL MEDICINE

## 2018-10-08 PROCEDURE — 93298 REM INTERROG DEV EVAL SCRMS: CPT | Mod: S$GLB,,, | Performed by: INTERNAL MEDICINE

## 2018-11-12 ENCOUNTER — CLINICAL SUPPORT (OUTPATIENT)
Dept: CARDIOLOGY | Facility: HOSPITAL | Age: 23
End: 2018-11-12
Attending: INTERNAL MEDICINE
Payer: COMMERCIAL

## 2018-11-12 DIAGNOSIS — Z95.818 STATUS POST PLACEMENT OF IMPLANTABLE LOOP RECORDER: ICD-10-CM

## 2018-11-12 DIAGNOSIS — R55 VASOVAGAL SYNCOPE: ICD-10-CM

## 2018-11-12 PROCEDURE — 93299 CARDIAC DEVICE CHECK CHECK - REMOTE: CPT

## 2018-11-12 PROCEDURE — 93298 REM INTERROG DEV EVAL SCRMS: CPT | Mod: ,,, | Performed by: INTERNAL MEDICINE

## 2018-12-13 ENCOUNTER — CLINICAL SUPPORT (OUTPATIENT)
Dept: CARDIOLOGY | Facility: HOSPITAL | Age: 23
End: 2018-12-13
Attending: INTERNAL MEDICINE
Payer: COMMERCIAL

## 2018-12-13 DIAGNOSIS — Z95.818 STATUS POST PLACEMENT OF IMPLANTABLE LOOP RECORDER: ICD-10-CM

## 2018-12-13 DIAGNOSIS — R55 VASOVAGAL SYNCOPE: ICD-10-CM

## 2018-12-13 PROCEDURE — 93299 CARDIAC DEVICE CHECK CHECK - REMOTE: CPT

## 2018-12-31 PROCEDURE — 93298 CARDIAC DEVICE CHECK CHECK - REMOTE: ICD-10-PCS | Mod: ,,, | Performed by: INTERNAL MEDICINE

## 2018-12-31 PROCEDURE — 93298 REM INTERROG DEV EVAL SCRMS: CPT | Mod: ,,, | Performed by: INTERNAL MEDICINE

## 2018-12-31 PROCEDURE — 93299 PR INTERROG EVAL, REMOTE, UP TO 30 DAYS, CV MON/LOOP REC,TECH REVIEW: CPT | Mod: ,,, | Performed by: INTERNAL MEDICINE

## 2018-12-31 PROCEDURE — 93299 PR INTERROG EVAL, REMOTE, UP TO 30 DAYS, CV MON/LOOP REC,TECH REVIEW: ICD-10-PCS | Mod: ,,, | Performed by: INTERNAL MEDICINE

## 2019-01-04 RX ORDER — LISINOPRIL 5 MG/1
TABLET ORAL
Qty: 30 TABLET | Refills: 12 | Status: SHIPPED | OUTPATIENT
Start: 2019-01-04 | End: 2020-01-06

## 2019-01-28 ENCOUNTER — CLINICAL SUPPORT (OUTPATIENT)
Dept: CARDIOLOGY | Facility: HOSPITAL | Age: 24
End: 2019-01-28
Attending: INTERNAL MEDICINE
Payer: COMMERCIAL

## 2019-01-28 DIAGNOSIS — Z46.9 FITTING AND ADJUSTMENT OF DEVICE: ICD-10-CM

## 2019-01-28 PROCEDURE — 93299 CARDIAC DEVICE CHECK - REMOTE: CPT

## 2019-02-04 DIAGNOSIS — Z46.9 FITTING AND ADJUSTMENT OF DEVICE: Primary | ICD-10-CM

## 2019-02-15 ENCOUNTER — CLINICAL SUPPORT (OUTPATIENT)
Dept: CARDIOLOGY | Facility: HOSPITAL | Age: 24
End: 2019-02-15
Attending: INTERNAL MEDICINE
Payer: COMMERCIAL

## 2019-02-15 DIAGNOSIS — Z46.9 FITTING AND ADJUSTMENT OF DEVICE: ICD-10-CM

## 2019-02-15 PROCEDURE — 93299 CARDIAC DEVICE CHECK - REMOTE: CPT

## 2019-02-18 DIAGNOSIS — Z46.9 FITTING AND ADJUSTMENT OF DEVICE: Primary | ICD-10-CM

## 2019-03-18 ENCOUNTER — CLINICAL SUPPORT (OUTPATIENT)
Dept: CARDIOLOGY | Facility: HOSPITAL | Age: 24
End: 2019-03-18
Attending: INTERNAL MEDICINE
Payer: COMMERCIAL

## 2019-03-18 DIAGNOSIS — Z46.9 FITTING AND ADJUSTMENT OF DEVICE: ICD-10-CM

## 2019-03-18 PROCEDURE — 93299 CARDIAC DEVICE CHECK - REMOTE: CPT

## 2019-04-16 ENCOUNTER — CLINICAL SUPPORT (OUTPATIENT)
Dept: CARDIOLOGY | Facility: HOSPITAL | Age: 24
End: 2019-04-16
Attending: INTERNAL MEDICINE
Payer: COMMERCIAL

## 2019-04-16 DIAGNOSIS — Z46.9 FITTING AND ADJUSTMENT OF DEVICE: ICD-10-CM

## 2019-04-16 PROCEDURE — 93299 CARDIAC DEVICE CHECK - REMOTE: CPT

## 2019-05-20 ENCOUNTER — CLINICAL SUPPORT (OUTPATIENT)
Dept: CARDIOLOGY | Facility: HOSPITAL | Age: 24
End: 2019-05-20
Attending: INTERNAL MEDICINE
Payer: COMMERCIAL

## 2019-05-20 DIAGNOSIS — Z46.9 FITTING AND ADJUSTMENT OF DEVICE: ICD-10-CM

## 2019-05-20 PROCEDURE — 93299 CARDIAC DEVICE CHECK - REMOTE: CPT

## 2019-06-17 ENCOUNTER — CLINICAL SUPPORT (OUTPATIENT)
Dept: CARDIOLOGY | Facility: HOSPITAL | Age: 24
End: 2019-06-17
Attending: INTERNAL MEDICINE
Payer: COMMERCIAL

## 2019-06-17 DIAGNOSIS — Z46.9 FITTING AND ADJUSTMENT OF DEVICE: ICD-10-CM

## 2019-06-17 PROCEDURE — 93299 CARDIAC DEVICE CHECK - REMOTE: CPT

## 2019-08-28 ENCOUNTER — CLINICAL SUPPORT (OUTPATIENT)
Dept: CARDIOLOGY | Facility: HOSPITAL | Age: 24
End: 2019-08-28
Payer: COMMERCIAL

## 2019-08-28 PROCEDURE — 93298 REM INTERROG DEV EVAL SCRMS: CPT | Mod: ,,, | Performed by: INTERNAL MEDICINE

## 2019-08-28 PROCEDURE — 93298 CARDIAC DEVICE CHECK - REMOTE: ICD-10-PCS | Mod: ,,, | Performed by: INTERNAL MEDICINE

## 2019-08-28 PROCEDURE — 93299 CARDIAC DEVICE CHECK - REMOTE: CPT | Performed by: INTERNAL MEDICINE

## 2019-09-10 ENCOUNTER — TELEPHONE (OUTPATIENT)
Dept: ELECTROPHYSIOLOGY | Facility: CLINIC | Age: 24
End: 2019-09-10

## 2019-09-10 DIAGNOSIS — I49.8 OTHER SPECIFIED CARDIAC ARRHYTHMIAS: Primary | ICD-10-CM

## 2019-09-10 DIAGNOSIS — Z87.74 S/P COARCTATION STENT: ICD-10-CM

## 2019-09-10 DIAGNOSIS — Q25.1 AORTIC COARCTATION: ICD-10-CM

## 2019-09-10 DIAGNOSIS — Q24.9 ADULT CONGENITAL HEART DISEASE: Primary | ICD-10-CM

## 2019-09-10 DIAGNOSIS — Z95.828 S/P COARCTATION STENT: ICD-10-CM

## 2019-09-10 NOTE — TELEPHONE ENCOUNTER
Spoke w/ pts mother & scheduled appts.   ----- Message from RT Therese sent at 9/10/2019 12:10 PM CDT -----  Contact: Mother      ----- Message -----  From: Chacha Connell  Sent: 9/10/2019  11:37 AM  To: [facility] Arrhythmia Device Staff    Pt needs to schedule 1yr FU has device. Thanks    [phone]

## 2019-09-20 DIAGNOSIS — Q25.1 AORTIC COARCTATION: ICD-10-CM

## 2019-09-20 DIAGNOSIS — Q21.0 VSD (VENTRICULAR SEPTAL DEFECT): Primary | ICD-10-CM

## 2019-09-20 DIAGNOSIS — Z87.74 S/P COARCTATION STENT: ICD-10-CM

## 2019-09-20 DIAGNOSIS — Z95.828 S/P COARCTATION STENT: ICD-10-CM

## 2019-09-25 ENCOUNTER — CLINICAL SUPPORT (OUTPATIENT)
Dept: PEDIATRIC CARDIOLOGY | Facility: CLINIC | Age: 24
End: 2019-09-25
Payer: COMMERCIAL

## 2019-09-25 ENCOUNTER — OFFICE VISIT (OUTPATIENT)
Dept: ELECTROPHYSIOLOGY | Facility: CLINIC | Age: 24
End: 2019-09-25
Payer: COMMERCIAL

## 2019-09-25 ENCOUNTER — OFFICE VISIT (OUTPATIENT)
Dept: PEDIATRIC CARDIOLOGY | Facility: CLINIC | Age: 24
End: 2019-09-25
Payer: COMMERCIAL

## 2019-09-25 VITALS
BODY MASS INDEX: 19.73 KG/M2 | SYSTOLIC BLOOD PRESSURE: 130 MMHG | HEIGHT: 69 IN | DIASTOLIC BLOOD PRESSURE: 64 MMHG | HEART RATE: 88 BPM | WEIGHT: 133.19 LBS

## 2019-09-25 VITALS
HEIGHT: 69 IN | DIASTOLIC BLOOD PRESSURE: 81 MMHG | WEIGHT: 132.25 LBS | BODY MASS INDEX: 19.59 KG/M2 | SYSTOLIC BLOOD PRESSURE: 134 MMHG | HEART RATE: 91 BPM

## 2019-09-25 DIAGNOSIS — I10 ESSENTIAL HYPERTENSION: ICD-10-CM

## 2019-09-25 DIAGNOSIS — Z98.890 HISTORY OF LOOP RECORDER: ICD-10-CM

## 2019-09-25 DIAGNOSIS — I49.8 OTHER SPECIFIED CARDIAC ARRHYTHMIAS: ICD-10-CM

## 2019-09-25 DIAGNOSIS — Q25.9 CONGENITAL MALFORMATION OF GREAT ARTERIES: ICD-10-CM

## 2019-09-25 DIAGNOSIS — Q25.1 AORTIC COARCTATION: ICD-10-CM

## 2019-09-25 DIAGNOSIS — Q21.0 VSD (VENTRICULAR SEPTAL DEFECT): ICD-10-CM

## 2019-09-25 DIAGNOSIS — Z87.74 S/P COARCTATION STENT: ICD-10-CM

## 2019-09-25 DIAGNOSIS — Q24.9 ADULT CONGENITAL HEART DISEASE: ICD-10-CM

## 2019-09-25 DIAGNOSIS — R55 VASOVAGAL SYNCOPE: Primary | ICD-10-CM

## 2019-09-25 DIAGNOSIS — Z95.828 S/P COARCTATION STENT: ICD-10-CM

## 2019-09-25 DIAGNOSIS — Q21.0 TGA/VSD (TRANSPOSITION OF GREAT ARTERIES, VENTRICULAR SEPTAL DEFECT): ICD-10-CM

## 2019-09-25 DIAGNOSIS — Q20.3 TGA/VSD (TRANSPOSITION OF GREAT ARTERIES, VENTRICULAR SEPTAL DEFECT): ICD-10-CM

## 2019-09-25 DIAGNOSIS — Z98.890 PERSONAL HISTORY OF SURGERY TO HEART AND GREAT VESSELS, PRESENTING HAZARDS TO HEALTH: Primary | ICD-10-CM

## 2019-09-25 PROCEDURE — 93303 PR ECHO XTHORACIC,CONG A2M,COMPLETE: ICD-10-PCS | Mod: S$GLB,,, | Performed by: PEDIATRICS

## 2019-09-25 PROCEDURE — 93325 PR DOPPLER COLOR FLOW VELOCITY MAP: ICD-10-PCS | Mod: S$GLB,,, | Performed by: PEDIATRICS

## 2019-09-25 PROCEDURE — 93010 RHYTHM STRIP: ICD-10-PCS | Mod: S$GLB,,, | Performed by: INTERNAL MEDICINE

## 2019-09-25 PROCEDURE — 93303 ECHO TRANSTHORACIC: CPT | Mod: PBBFAC | Performed by: PEDIATRICS

## 2019-09-25 PROCEDURE — 99213 OFFICE O/P EST LOW 20 MIN: CPT | Mod: PBBFAC,27,25 | Performed by: PEDIATRICS

## 2019-09-25 PROCEDURE — 99214 OFFICE O/P EST MOD 30 MIN: CPT | Mod: S$GLB,,, | Performed by: NURSE PRACTITIONER

## 2019-09-25 PROCEDURE — 99214 PR OFFICE/OUTPT VISIT, EST, LEVL IV, 30-39 MIN: ICD-10-PCS | Mod: 25,S$GLB,, | Performed by: PEDIATRICS

## 2019-09-25 PROCEDURE — 93005 RHYTHM STRIP: ICD-10-PCS | Mod: S$GLB,,, | Performed by: INTERNAL MEDICINE

## 2019-09-25 PROCEDURE — 93320 DOPPLER ECHO COMPLETE: CPT | Mod: PBBFAC | Performed by: PEDIATRICS

## 2019-09-25 PROCEDURE — 93320 DOPPLER ECHO COMPLETE: CPT | Mod: S$GLB,,, | Performed by: PEDIATRICS

## 2019-09-25 PROCEDURE — 99213 OFFICE O/P EST LOW 20 MIN: CPT | Mod: PBBFAC | Performed by: NURSE PRACTITIONER

## 2019-09-25 PROCEDURE — 99214 PR OFFICE/OUTPT VISIT, EST, LEVL IV, 30-39 MIN: ICD-10-PCS | Mod: S$GLB,,, | Performed by: NURSE PRACTITIONER

## 2019-09-25 PROCEDURE — 99999 PR PBB SHADOW E&M-EST. PATIENT-LVL III: ICD-10-PCS | Mod: PBBFAC,,, | Performed by: NURSE PRACTITIONER

## 2019-09-25 PROCEDURE — 93303 ECHO TRANSTHORACIC: CPT | Mod: S$GLB,,, | Performed by: PEDIATRICS

## 2019-09-25 PROCEDURE — 99999 PR PBB SHADOW E&M-EST. PATIENT-LVL III: ICD-10-PCS | Mod: PBBFAC,,, | Performed by: PEDIATRICS

## 2019-09-25 PROCEDURE — 99999 PR PBB SHADOW E&M-EST. PATIENT-LVL III: CPT | Mod: PBBFAC,,, | Performed by: NURSE PRACTITIONER

## 2019-09-25 PROCEDURE — 93325 DOPPLER ECHO COLOR FLOW MAPG: CPT | Mod: S$GLB,,, | Performed by: PEDIATRICS

## 2019-09-25 PROCEDURE — 93325 DOPPLER ECHO COLOR FLOW MAPG: CPT | Mod: PBBFAC | Performed by: PEDIATRICS

## 2019-09-25 PROCEDURE — 99999 PR PBB SHADOW E&M-EST. PATIENT-LVL III: CPT | Mod: PBBFAC,,, | Performed by: PEDIATRICS

## 2019-09-25 PROCEDURE — 93005 ELECTROCARDIOGRAM TRACING: CPT | Mod: PBBFAC | Performed by: INTERNAL MEDICINE

## 2019-09-25 PROCEDURE — 93010 ELECTROCARDIOGRAM REPORT: CPT | Mod: S$GLB,,, | Performed by: INTERNAL MEDICINE

## 2019-09-25 PROCEDURE — 93005 ELECTROCARDIOGRAM TRACING: CPT | Mod: S$GLB,,, | Performed by: INTERNAL MEDICINE

## 2019-09-25 PROCEDURE — 93320 PR DOPPLER ECHO HEART,COMPLETE: ICD-10-PCS | Mod: S$GLB,,, | Performed by: PEDIATRICS

## 2019-09-25 PROCEDURE — 99214 OFFICE O/P EST MOD 30 MIN: CPT | Mod: 25,S$GLB,, | Performed by: PEDIATRICS

## 2019-09-25 NOTE — PROGRESS NOTES
2019    re:Livia May  :1995    [facility]    Brigida Arellano MD  [address] [facility] [address]    Dear Dr. Arellano:        Patient ID:  Livia May is a 24 y.o. male who presents for follow-up of complex congenital heart disease.  To summarize, his diagnoses are as follows:  1.  TGA/VSD s/[facility]   2.  Coarctation stent ( -Claudio).  3.  Residual LV to RA shunt (1.4:1 shunt on cath , very small shunt noted on  cardiac catheterization)  4.  Normal coronaries on cath  and on cardiac CTA 2018  5.  mild aortic insufficiency and mild to moderate aortic root enlargement (looks a little enlarged on today's echo compared to last year)  6.  Likely very mild branch pulmonary artery stenosis  7.  Likely PFO (cath ).  Positive bubble study.  No migraines or TIA symptoms.  8.  Mildly abnormal appearing tricuspid valve without significant tricuspid insufficiency or stenosis   9.  Syncope, 2018.  Likely vasovagal in origin.  Extensive workup negative including EP study.  Now status post implantable loop recorder 2018.   - follows with Dr. Ley in EP    Interval history:  Overall, he has done well over the past year.  He has had no more syncope since the episode that occurred in .  He has an implantable loop recorder, and he has had no arrhythmias.  His only complaint is occasional chest pain.  This occurs at rest, often when he is supine.  He describes a midsternal chest pressure that does not radiate.  It lasts about 5 min and resolved gradually.  He thinks that drinking water makes it better.  He knows of no other precipitating, exacerbating, or relieving maneuvers.  He sometimes feels short of breath during these episodes.  He denies palpitations.  He denies any association with eating or with exercise.  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  He occasionally will  "walk around his neighborhood for exercise.  He has no dyspnea on exertion or any chest pain with exertion.  His mom has seen him have 1 of these episodes of chest pain.  He looks pale and concerned.  He denies cough.  He denies any recent fevers.  He has had no edema.  He denies orthopnea.    Review of Systems   Constitution: Negative.   HENT: Negative.    Eyes: Negative.    Cardiovascular: Negative.    Respiratory: Negative.    Endocrine: Negative.    Hematologic/Lymphatic: Negative.    Skin: Negative.    Musculoskeletal: Negative.    Gastrointestinal: Negative.    Genitourinary: Negative.    Neurological: Negative.    Psychiatric/Behavioral: Negative.    Allergic/Immunologic: Negative.        /81   Pulse 91   Ht 5' 9.29" (1.76 m)   Wt 60 kg (132 lb 4.4 oz)   BMI 19.37 kg/m²         Objective:    Physical Exam   Constitutional: He is oriented to person, place, and time. He appears well-developed and well-nourished. No distress.   HENT:   Head: Normocephalic and atraumatic.   Right Ear: External ear normal.   Left Ear: External ear normal.   Nose: Nose normal.   Mouth/Throat: Oropharynx is clear and moist. No oropharyngeal exudate.   Eyes: Pupils are equal, round, and reactive to light. Conjunctivae and EOM are normal. Right eye exhibits no discharge. Left eye exhibits no discharge. No scleral icterus.   Neck: Normal range of motion. Neck supple. No JVD present. No tracheal deviation present. No thyromegaly present.   Cardiovascular: Normal rate, regular rhythm, S1 normal, S2 normal and intact distal pulses. Exam reveals no gallop and no friction rub.   Murmur heard.  High-pitched blowing early systolic murmur is present with a grade of 1/6 at the lower left sternal border.  Pulses:       Carotid pulses are 2+ on the right side, and 2+ on the left side.       Radial pulses are 2+ on the right side, and 2+ on the left side.        Femoral pulses are 2+ on the right side, and 2+ on the left side.       " Dorsalis pedis pulses are 2+ on the right side, and 2+ on the left side.   2/6 harsh, squeaky murmur at LLSB and apex   Pulmonary/Chest: Effort normal and breath sounds normal. No stridor. No respiratory distress. He has no wheezes. He has no rales. He exhibits no tenderness.   well healed sternotomy   Abdominal: Soft. Bowel sounds are normal. He exhibits no distension and no mass. There is no tenderness. There is no rebound and no guarding.   Musculoskeletal: Normal range of motion. He exhibits no edema, tenderness or deformity.   Lymphadenopathy:     He has no cervical adenopathy.   Neurological: He is alert and oriented to person, place, and time. No cranial nerve deficit. He exhibits normal muscle tone. Coordination normal.   Skin: Skin is warm and dry. He is not diaphoretic.   Psychiatric: He has a normal mood and affect. His behavior is normal. Judgment and thought content normal.         The official echo report is pending.  I reviewed the study.  Imaging is quite difficult.  He has good biventricular function.  There appears to be prolapse of both the tricuspid and the mitral valve, but no significant insufficiency.  A small ventricular septal defect is noted.  I cannot rule out a tiny atrial level shunt.  The branch pulmonary arteries are not well seen.  Pulsatile flow in the abdominal aorta is noted without any diastolic runoff.  The peak velocity across the stent in the coarctation is about 2.2 m/sec.  There is some dilation of the aortic root.  This is difficult to evaluate on echo, but a diameter as high as 4.2 cm is noted.  This is significantly different compared to the diameter of about 3.2 cm obtained a year ago.    I reviewed the cardiac catheterization report from January 12, 2012:  1.  A small residual left ventricle to right atrial shunt was noted.  2.  The coarctation was stented with a 26 x 10 Tito LD stent  3.  There was about a 14-16 mmHg gradient from the right ventricle into the branch  pulmonary arteries    A cardiac catheterization in 2007 revealed normal coronary arteries.    CTA performed June 12, 2018:  1.  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  No stenosis identified in the reimplanted coronary arteries.  2.  Possible thickening of the anterior leaflet of the mitral valve; please correlate with echocardiography.  3.  I suspect perimembranous VSD patch.  4.  Measurements are provided for the aorta and pulmonary arteries.  Stent in the proximal descending aorta represents treatment for coarctation.  5.  Arch and isthmus are incompletely included on this study diverted to assessment for coronary artery anatomy and caliber.  Ascending aorta measurements:  --Sinotubular junction measures 3.1 cm (sagittal series 105, image 51)  --Mid ascending aorta between the left and right pulmonary arteries measures 2.3 cm (coronal series 106, image 31).  Pledgets are present at the posterior margin of the aorta at this level.  -Aortic arch and isthmus are incompletely included on the study which was devoted to assessment of the origins and proximal extents of the reimplanted coronary arteries.  -Stent in the proximal descending aorta has an internal dimension of 1.4 cm (axial series 4, image 17) over a distance of 2.9 cm.     Plan:  1.  Continue lisinopril, consider increasing dose if hypertension noted with exercise stress test.  2.  Cardiac MRI, CPX testing  3.  SBEP is indicated due to residual VSD  4.  Provided his blood pressure looks good with the CPX and the MRI is reassuring, I will see him again in a year.    5.  Follow-up as scheduled with electrophysiology  6.  Continue increased fluid intake, sit down immediately if he becomes lightheaded.    Discussion:  Overall, he looks good.  His echo is difficult.  His windows are suboptimal.  It is difficult to truly evaluate the stent across the coarctation, but he has excellent  distal pulses, no diastolic runoff, and a higher blood pressure in his legs.  I do not suspect a significant residual coarctation.  I do have some concerns about his aortic root.  On echo today, it looks larger than it did a year ago, but the imaging is quite suboptimal.  A cardiac MRI should show this well.  I checked with our MRI team, and the loop recorder should not prevent an MRI.  I do not think that his chest pain is cardiac in origin, but the combination of a stress test and MRI should help reassure us.    Sincerely,        Patrick Godfrey MD  Pediatric Cardiology  Adult Congenital Heart Disease  Pediatric Heart Failure and Transplantation  [facility]  [address] [place]  [address]  [phone]

## 2019-09-25 NOTE — Clinical Note
Hi - patient is signing up for patient portal and is requesting monthly ILR reports sent to portal if possible? Thanks.

## 2019-09-25 NOTE — PROGRESS NOTES
"Mr. May is a patient of Dr. Ley and was last seen in clinic 9/12/2018.      Subjective:   Patient ID:  Livai May is a 24 y.o. male who presents for follow-up of Palpitations  .     HPI:    Mr. May is a 24 y.o. male with TGA and VSD, coarctation of aorta, syncope, ILR here for annual follow up.     Background:    TGA and VSD, s/p arterial switch and VSD closure, with residual LV->RA shunt  Ao coarctation -> stented 2012    Had syncope, thought vasovagal (during fingernail clipping).  Extensive workup, including EPS 6/18, unrevealing. ILR placed out of abundance of caution.  No recurrence. Feels well.    Update (09/25/2019):    Today he says he has had some rare (monthly at most) episodes of a few minutes of heart racing and SOB. Pressed the symptom button once. Reviewed ILR report from march. Patient "active" at the time. SVT vs ST, 130s-140s, Did not meet escalation criteria. He has not pressed the symptom button since. ILR reports have found no arrhythmia since. He has not had any recurrent syncopal episodes.    I have personally reviewed the patient's EKG today, which shows sinus rhythm with RBBB at 92bpm. DE interval is 122. QRS is 138. QTc is 479.    Recent Cardiac Tests:    2D Echo (6/11/2018):  CONCLUSIONS     1 - Right heart size is normal with Normal RV systolic function.     2 - Mild tricuspid regurgitation.     3 - The estimated RV systolic pressure is 25 mmHg.     4 - Pulmonary valve not well seen; mild PI. Rao Maneuvar. Proximal PA not well seen.     5 - Normal left ventricular systolic function (EF 55-60%).     6 - Normal left ventricular diastolic function.     7 - On agitated saline injection, there is opacification of the right heart and then LV, without opacification of the LA. Consistent with known LV to RA shunt.     8 - Mild aortic regurgitation.     9 - Descending aorta with peak velocity of 2.1m/sec with stent noted..     Current Outpatient Medications   Medication Sig    " "lisinopril (PRINIVIL,ZESTRIL) 5 MG tablet TAKE 1 TABLET BY MOUTH DAILY **May Cause Dizziness**    lisinopril (PRINIVIL,ZESTRIL) 5 MG tablet TAKE 1 TABLET BY MOUTH DAILY **May Cause Dizziness**     No current facility-administered medications for this visit.        Review of Systems   Constitution: Negative for malaise/fatigue.   Cardiovascular: Positive for palpitations. Negative for chest pain, dyspnea on exertion, irregular heartbeat and leg swelling.   Respiratory: Negative for shortness of breath.    Hematologic/Lymphatic: Negative for bleeding problem.   Skin: Negative for rash.   Musculoskeletal: Negative for myalgias.   Gastrointestinal: Negative for hematemesis, hematochezia and nausea.   Genitourinary: Negative for hematuria.   Neurological: Negative for light-headedness.   Psychiatric/Behavioral: Negative for altered mental status.   Allergic/Immunologic: Negative for persistent infections.         Objective:          /64   Pulse 88   Ht 5' 9" (1.753 m)   Wt 60.4 kg (133 lb 2.5 oz)   BMI 19.66 kg/m²     Physical Exam   Constitutional: He is oriented to person, place, and time. He appears well-developed and well-nourished.   HENT:   Head: Normocephalic.   Nose: Nose normal.   Eyes: Pupils are equal, round, and reactive to light.   Cardiovascular: Normal rate, regular rhythm, S1 normal and S2 normal.   No murmur heard.  Pulses:       Radial pulses are 2+ on the right side, and 2+ on the left side.   Pulmonary/Chest: Breath sounds normal. No respiratory distress.   Abdominal: Normal appearance.   Musculoskeletal: Normal range of motion. He exhibits no edema.   Neurological: He is alert and oriented to person, place, and time.   Skin: Skin is warm and dry. No erythema.   Psychiatric: He has a normal mood and affect. His speech is normal and behavior is normal.   Nursing note and vitals reviewed.    Lab Results   Component Value Date     06/13/2018    K 3.8 06/13/2018    MG 2.4 06/09/2018    " BUN 13 06/13/2018    CREATININE 0.7 06/13/2018    ALT 16 06/09/2018    AST 14 06/09/2018    HGB 15.1 06/11/2018    HCT 44.7 06/11/2018    TSH 0.877 06/09/2018       Recent Labs   Lab 06/09/18  0617 06/11/18  0459   INR 1.2 1.1       Assessment:     1. Vasovagal syncope    2. History of loop recorder    3. Essential hypertension      Plan:     In summary, Mr. May is a 24 y.o. male with TGA and VSD, coarctation of aorta, syncope, ILR here for annual follow up.   Reporting rare palpitations. No auto capture episodes on ILR. One symptom episode from March 2019 associated with SVT vs ST in 130s while active. I instructed him to press the button every time he has symptoms so we can collect more data. He has not had any syncopal episodes since device implantation.    Press symptom button for symptoms.  Continue routine device checks.  RTC one year, sooner if needed.      *A copy of this note has been sent to Dr. Ley*    Follow up in about 1 year (around 9/25/2020).    ------------------------------------------------------------------    SNEHAL Lyles, NP-C  Cardiac Electrophysiology

## 2019-09-27 ENCOUNTER — CLINICAL SUPPORT (OUTPATIENT)
Dept: CARDIOLOGY | Facility: HOSPITAL | Age: 24
End: 2019-09-27
Payer: COMMERCIAL

## 2019-09-27 DIAGNOSIS — R55 VASOVAGAL SYNCOPE: ICD-10-CM

## 2019-09-27 DIAGNOSIS — Z95.818 STATUS POST PLACEMENT OF IMPLANTABLE LOOP RECORDER: ICD-10-CM

## 2019-09-27 PROCEDURE — 93298 CARDIAC DEVICE CHECK - REMOTE: ICD-10-PCS | Mod: ,,, | Performed by: INTERNAL MEDICINE

## 2019-09-27 PROCEDURE — 93299 CARDIAC DEVICE CHECK - REMOTE: CPT | Performed by: INTERNAL MEDICINE

## 2019-09-27 PROCEDURE — 93298 REM INTERROG DEV EVAL SCRMS: CPT | Mod: ,,, | Performed by: INTERNAL MEDICINE

## 2019-10-22 ENCOUNTER — TELEPHONE (OUTPATIENT)
Dept: PEDIATRIC CARDIOLOGY | Facility: CLINIC | Age: 24
End: 2019-10-22

## 2019-10-22 ENCOUNTER — HOSPITAL ENCOUNTER (OUTPATIENT)
Dept: RADIOLOGY | Facility: HOSPITAL | Age: 24
Discharge: HOME | End: 2019-10-22
Attending: PEDIATRICS
Payer: COMMERCIAL

## 2019-10-22 ENCOUNTER — HOSPITAL ENCOUNTER (OUTPATIENT)
Dept: CARDIOLOGY | Facility: CLINIC | Age: 24
Discharge: HOME | End: 2019-10-22
Attending: PEDIATRICS
Payer: COMMERCIAL

## 2019-10-22 VITALS
DIASTOLIC BLOOD PRESSURE: 51 MMHG | HEIGHT: 69 IN | SYSTOLIC BLOOD PRESSURE: 121 MMHG | WEIGHT: 131 LBS | BODY MASS INDEX: 19.4 KG/M2 | HEART RATE: 100 BPM

## 2019-10-22 DIAGNOSIS — Z98.890 PERSONAL HISTORY OF SURGERY TO HEART AND GREAT VESSELS, PRESENTING HAZARDS TO HEALTH: ICD-10-CM

## 2019-10-22 DIAGNOSIS — Q25.1 AORTIC COARCTATION: ICD-10-CM

## 2019-10-22 DIAGNOSIS — Q21.0 TGA/VSD (TRANSPOSITION OF GREAT ARTERIES, VENTRICULAR SEPTAL DEFECT): ICD-10-CM

## 2019-10-22 DIAGNOSIS — Q20.3 TRANSPOSITION OF THE GREAT ARTERIES, ISOLATED (SDD): Primary | ICD-10-CM

## 2019-10-22 DIAGNOSIS — Q25.9 CONGENITAL MALFORMATION OF GREAT ARTERIES: ICD-10-CM

## 2019-10-22 DIAGNOSIS — Q21.0 VSD (VENTRICULAR SEPTAL DEFECT): ICD-10-CM

## 2019-10-22 DIAGNOSIS — Q24.9 ADULT CONGENITAL HEART DISEASE: ICD-10-CM

## 2019-10-22 DIAGNOSIS — Q20.3 TGA/VSD (TRANSPOSITION OF GREAT ARTERIES, VENTRICULAR SEPTAL DEFECT): ICD-10-CM

## 2019-10-22 LAB
CV STRESS BASE HR: 100 BPM
DIASTOLIC BLOOD PRESSURE: 51 MMHG
OHS CV CPX 1 MINUTE RECOVERY HEART RATE: 139 BPM
OHS CV CPX 85 PERCENT MAX PREDICTED HEART RATE MALE: 167
OHS CV CPX ANAEROBIC THRESHOLD DIASTOLIC BLOOD PRESSURE: 40 MMHG
OHS CV CPX ANAEROBIC THRESHOLD HEART RATE: 129
OHS CV CPX ANAEROBIC THRESHOLD RATE PRESSURE PRODUCT: NORMAL
OHS CV CPX ANAEROBIC THRESHOLD SYSTOLIC BLOOD PRESSURE: 139
OHS CV CPX DATA GRADE - AT: 14.9
OHS CV CPX DATA GRADE - PEAK: 18
OHS CV CPX DATA O2 SAT - PEAK: 98
OHS CV CPX DATA O2 SAT - REST: 99
OHS CV CPX DATA SPEED - AT: 3.9
OHS CV CPX DATA SPEED - PEAK: 5
OHS CV CPX DATA TIME - AT: 7.17
OHS CV CPX DATA TIME - PEAK: 8.6
OHS CV CPX DATA VE/VCO2 - AT: 29
OHS CV CPX DATA VE/VCO2 - PEAK: 31
OHS CV CPX DATA VE/VO2 - AT: 31
OHS CV CPX DATA VE/VO2 - PEAK: 39
OHS CV CPX DATA VO2 - AT: 21.2
OHS CV CPX DATA VO2 - PEAK: 27.1
OHS CV CPX DATA VO2 - REST: 8.1
OHS CV CPX FEV1/FVC: 0.91
OHS CV CPX FORCED EXPIRATORY VOLUME: 3.15
OHS CV CPX FORCED VITAL CAPACITY (FVC): 3.46
OHS CV CPX HIGHEST VO: 53
OHS CV CPX MAX PREDICTED HEART RATE: 196
OHS CV CPX MAXIMAL VOLUNTARY VENTILATION (MVV) PREDICTED: 126
OHS CV CPX MAXIMAL VOLUNTARY VENTILATION (MVV): 94
OHS CV CPX MAXIUMUM EXERCISE VENTILATION (VE MAX): 58.5
OHS CV CPX PATIENT AGE: 24
OHS CV CPX PATIENT HEIGHT IN: 69
OHS CV CPX PATIENT IS FEMALE AGE 11-19: 0
OHS CV CPX PATIENT IS FEMALE AGE GREATER THAN 19: 0
OHS CV CPX PATIENT IS FEMALE AGE LESS THAN 11: 0
OHS CV CPX PATIENT IS FEMALE: 0
OHS CV CPX PATIENT IS MALE AGE 11-25: 1
OHS CV CPX PATIENT IS MALE AGE GREATER THAN 25: 0
OHS CV CPX PATIENT IS MALE AGE LESS THAN 11: 0
OHS CV CPX PATIENT IS MALE GREATER THAN 18: 1
OHS CV CPX PATIENT IS MALE LESS THAN OR EQUAL TO 18: 0
OHS CV CPX PATIENT IS MALE: 1
OHS CV CPX PATIENT WEIGHT RETURNED IN OZ: 2096
OHS CV CPX PEAK DIASTOLIC BLOOD PRESSURE: 57 MMHG
OHS CV CPX PEAK HEAR RATE: 148 BPM
OHS CV CPX PEAK RATE PRESSURE PRODUCT: NORMAL
OHS CV CPX PEAK SYSTOLIC BLOOD PRESSURE: 130 MMHG
OHS CV CPX PERCENT BODY FAT: 5.9
OHS CV CPX PERCENT MAX PREDICTED HEART RATE ACHIEVED: 76
OHS CV CPX PREDICTED VO2: 53 ML/KG/MIN
OHS CV CPX RATE PRESSURE PRODUCT PRESENTING: NORMAL
OHS CV CPX REST PET CO2: 38
OHS CV CPX VE/VCO2 SLOPE: 26.9
STRESS ECHO POST EXERCISE DUR MIN: 8 MINUTES
STRESS ECHO POST EXERCISE DUR SEC: 36 SECONDS
SYSTOLIC BLOOD PRESSURE: 121 MMHG

## 2019-10-22 PROCEDURE — 75561 MRI CARDIAC MORPHOLOGY FUNCTION W WO: ICD-10-PCS | Mod: 26,,, | Performed by: INTERNAL MEDICINE

## 2019-10-22 PROCEDURE — 75561 CARDIAC MRI FOR MORPH W/DYE: CPT | Mod: 26,,, | Performed by: INTERNAL MEDICINE

## 2019-10-22 PROCEDURE — 94621 CARDIOPULM EXERCISE TESTING: CPT

## 2019-10-22 PROCEDURE — 25500020 PHARM REV CODE 255: Performed by: PEDIATRICS

## 2019-10-22 PROCEDURE — 94621 CARDIOPULM EXERCISE TESTING: CPT | Mod: 26,,, | Performed by: INTERNAL MEDICINE

## 2019-10-22 PROCEDURE — 94621 CARDIOPULMONARY EXERCISE TESTING (CUPID ONLY): ICD-10-PCS | Mod: 26,,, | Performed by: INTERNAL MEDICINE

## 2019-10-22 PROCEDURE — A9577 INJ MULTIHANCE: HCPCS | Performed by: PEDIATRICS

## 2019-10-22 PROCEDURE — 75561 CARDIAC MRI FOR MORPH W/DYE: CPT | Mod: TC

## 2019-10-22 RX ADMIN — GADOBENATE DIMEGLUMINE 26 ML: 529 INJECTION, SOLUTION INTRAVENOUS at 02:10

## 2019-10-22 NOTE — TELEPHONE ENCOUNTER
Cardiac MRI and CPX results reviewed with the mother.  No changes necessary.  I will see him in a year with an echo and EKG.  We will likely repeat an MRI and CPX testing in 2 years unless the echo next year looks significantly worse.

## 2019-10-27 ENCOUNTER — CLINICAL SUPPORT (OUTPATIENT)
Dept: CARDIOLOGY | Facility: HOSPITAL | Age: 24
End: 2019-10-27
Attending: INTERNAL MEDICINE
Payer: COMMERCIAL

## 2019-10-27 DIAGNOSIS — R55 VASOVAGAL SYNCOPE: ICD-10-CM

## 2019-10-27 DIAGNOSIS — Z95.818 STATUS POST PLACEMENT OF IMPLANTABLE LOOP RECORDER: ICD-10-CM

## 2019-10-27 PROCEDURE — 93299 CARDIAC DEVICE CHECK - REMOTE: CPT | Performed by: INTERNAL MEDICINE

## 2019-10-27 PROCEDURE — 93298 REM INTERROG DEV EVAL SCRMS: CPT | Mod: ,,, | Performed by: INTERNAL MEDICINE

## 2019-10-27 PROCEDURE — 93298 CARDIAC DEVICE CHECK - REMOTE: ICD-10-PCS | Mod: ,,, | Performed by: INTERNAL MEDICINE

## 2019-11-26 ENCOUNTER — CLINICAL SUPPORT (OUTPATIENT)
Dept: CARDIOLOGY | Facility: HOSPITAL | Age: 24
End: 2019-11-26
Payer: COMMERCIAL

## 2019-11-26 DIAGNOSIS — Z95.818 PRESENCE OF OTHER CARDIAC IMPLANTS AND GRAFTS: ICD-10-CM

## 2019-11-26 PROCEDURE — 93298 CARDIAC DEVICE CHECK - REMOTE: ICD-10-PCS | Mod: ,,, | Performed by: INTERNAL MEDICINE

## 2019-11-26 PROCEDURE — 93298 REM INTERROG DEV EVAL SCRMS: CPT | Mod: ,,, | Performed by: INTERNAL MEDICINE

## 2019-12-11 ENCOUNTER — TELEPHONE (OUTPATIENT)
Dept: CARDIOLOGY | Facility: HOSPITAL | Age: 24
End: 2019-12-11

## 2019-12-11 NOTE — TELEPHONE ENCOUNTER
[facility].  Tachy event in progress at the beginning of the recording so no onset can be seen.  Pt's mom stated he is having SOB and chest discomfort.  Rhythm strip retrieved from ILR recorded at 1:22pm, demonstrates possible ST vs. AT (p waves are discernable throughout most of strip), CL 457ms, IVCD.    Because of patient's symptoms, his mother was advised that he seek treatment at the local ED.  Mom is an ER PA.  She voiced understanding of the recommendation

## 2019-12-17 ENCOUNTER — OFFICE VISIT (OUTPATIENT)
Dept: PEDIATRIC CARDIOLOGY | Facility: CLINIC | Age: 24
End: 2019-12-17
Payer: COMMERCIAL

## 2019-12-17 ENCOUNTER — CLINICAL SUPPORT (OUTPATIENT)
Dept: PEDIATRIC CARDIOLOGY | Facility: CLINIC | Age: 24
End: 2019-12-17
Payer: COMMERCIAL

## 2019-12-17 VITALS
OXYGEN SATURATION: 100 % | DIASTOLIC BLOOD PRESSURE: 59 MMHG | SYSTOLIC BLOOD PRESSURE: 117 MMHG | HEIGHT: 70 IN | HEART RATE: 82 BPM | BODY MASS INDEX: 18.93 KG/M2 | WEIGHT: 132.25 LBS

## 2019-12-17 DIAGNOSIS — Z87.74 S/P COARCTATION STENT: ICD-10-CM

## 2019-12-17 DIAGNOSIS — Q24.9 ADULT CONGENITAL HEART DISEASE: ICD-10-CM

## 2019-12-17 DIAGNOSIS — Q25.1 AORTIC COARCTATION: ICD-10-CM

## 2019-12-17 DIAGNOSIS — Z98.890 PERSONAL HISTORY OF SURGERY TO HEART AND GREAT VESSELS, PRESENTING HAZARDS TO HEALTH: ICD-10-CM

## 2019-12-17 DIAGNOSIS — Q20.3 TRANSPOSITION OF THE GREAT ARTERIES, ISOLATED (SDD): ICD-10-CM

## 2019-12-17 DIAGNOSIS — Q21.0 VSD (VENTRICULAR SEPTAL DEFECT): Primary | ICD-10-CM

## 2019-12-17 DIAGNOSIS — Z95.828 S/P COARCTATION STENT: ICD-10-CM

## 2019-12-17 PROCEDURE — 99214 OFFICE O/P EST MOD 30 MIN: CPT | Mod: 25,S$GLB,, | Performed by: PEDIATRICS

## 2019-12-17 PROCEDURE — 3074F SYST BP LT 130 MM HG: CPT | Mod: CPTII,S$GLB,, | Performed by: PEDIATRICS

## 2019-12-17 PROCEDURE — 99999 PR PBB SHADOW E&M-EST. PATIENT-LVL III: CPT | Mod: PBBFAC,,, | Performed by: PEDIATRICS

## 2019-12-17 PROCEDURE — 93000 EKG 12-LEAD: ICD-10-PCS | Mod: S$GLB,,, | Performed by: PEDIATRICS

## 2019-12-17 PROCEDURE — 99214 PR OFFICE/OUTPT VISIT, EST, LEVL IV, 30-39 MIN: ICD-10-PCS | Mod: 25,S$GLB,, | Performed by: PEDIATRICS

## 2019-12-17 PROCEDURE — 99999 PR PBB SHADOW E&M-EST. PATIENT-LVL III: ICD-10-PCS | Mod: PBBFAC,,, | Performed by: PEDIATRICS

## 2019-12-17 PROCEDURE — 93000 ELECTROCARDIOGRAM COMPLETE: CPT | Mod: S$GLB,,, | Performed by: PEDIATRICS

## 2019-12-17 PROCEDURE — 3008F PR BODY MASS INDEX (BMI) DOCUMENTED: ICD-10-PCS | Mod: CPTII,S$GLB,, | Performed by: PEDIATRICS

## 2019-12-17 PROCEDURE — 3078F PR MOST RECENT DIASTOLIC BLOOD PRESSURE < 80 MM HG: ICD-10-PCS | Mod: CPTII,S$GLB,, | Performed by: PEDIATRICS

## 2019-12-17 PROCEDURE — 3074F PR MOST RECENT SYSTOLIC BLOOD PRESSURE < 130 MM HG: ICD-10-PCS | Mod: CPTII,S$GLB,, | Performed by: PEDIATRICS

## 2019-12-17 PROCEDURE — 3008F BODY MASS INDEX DOCD: CPT | Mod: CPTII,S$GLB,, | Performed by: PEDIATRICS

## 2019-12-17 PROCEDURE — 3078F DIAST BP <80 MM HG: CPT | Mod: CPTII,S$GLB,, | Performed by: PEDIATRICS

## 2019-12-17 RX ORDER — METOPROLOL TARTRATE 25 MG/1
TABLET, FILM COATED ORAL
Refills: 0 | COMMUNITY
Start: 2019-12-12 | End: 2020-09-17

## 2019-12-17 NOTE — PROGRESS NOTES
2019    re:Livia May  :1995    [facility]    Brigida Arellano MD  [address] [address]        Patient ID:  Livia May is a 24 y.o. male who presents for follow-up of complex congenital heart disease.  To summarize, his diagnoses are as follows:  1.  TGA/VSD s/p arterial switch [facility]   2.  Coarctation stent ( -Claudio).  3.  Residual LV to RA shunt (1.4:1 shunt on cath , very small shunt noted on  cardiac catheterization)  4.  Normal coronaries on cath ,   5.  mild aortic insufficiency and aortic root enlargement  6.  Very mild branch pulmonary artery stenosis  7.  Likely PFO (cath )  8.  Mildly abnormal appearing tricuspid valve without significant tricuspid insufficiency or stenosis  9.  Syncope, 2018, likely vasovagal in origin.  Extensive workup negative.  Now status post implantable loop recorder 2018.  10.  Positive right to left bubble study.  Unclear if shunting at atrial or ventricular level.  No migraines or TIA symptoms.  11.  Recent episode of chest tightness and palpitations, strongly consider anxiety    My recommendations are as follows:  1.  I would recommend endocarditis prophylaxis before dental work due to the residual ventricular septal defect.  2.  Follow up as scheduled tomorrow with electrophysiology.  3.  Report any syncope immediately.   4.  Continue increased fluid intake.  Sit down immediately if he becomes lightheaded.  Otherwise, there is no need for activity restriction or endocarditis prophylaxis.  5.  Any migraines or TIA like symptoms would prompt consideration of a repeat cardiac catheterization with transesophageal echocardiogram and closure of his patent foramen ovale given the right to left bubble study.  A repeat bubble study would be obtained in the lab to make sure there was no ventricular level shunting although this is obviously  extremely unlikely.  6.  I really think psychology evaluation for anxiety would be helpful.  They want to think about that for now, but I do think this would help with his symptoms.    Discussion:  I strongly suspect that this recent episode was related to anxiety and not to a true cardiac arrhythmia.  They are going to see electrophysiology tomorrow, and the results from the loop recorder will be further evaluated.  We had a long discussion about beta-blockers.  From a cardiac standpoint, I have no problems with him being on a beta-blocker although I do worry about it dropping his blood pressure more.  It could potentially help with some of the symptoms of anxiety, but I really think a psychology evaluation would be more helpful.  They are going to think about this.    History of present illness:  The history is provided by the patient and his mother.  They are good historians.  Last week, he developed generalized chest pressure associated with diaphoresis, shortness of breath, and a rapid heartbeat.  He admits that he was anxious at the time.  He was thinking about a very difficult social situation involving his girlfriend.  He ate something, but that did not help his symptoms.  He activated his loop recorder.  Almost immediately, the company called back to states that he had tachycardia with a heart rate around 140.  Over the next 30 min or so, this did not improve.  It was recommended that he go to the emergency room.  During the trip to the emergency room, he gradually felt better.  As per his mother's history, the heart rate was in the 120s in the emergency room for about an hour, but it gradually resolved.  He was sent home on a beta-blocker.  The 1st day, he accidentally took his mother's long-acting metoprolol.  He felt fine on that medication.  He then started taking the shorter acting twice a day metoprolol that was prescribed by the emergency room.  After the 3rd dose, he felt very nauseous.  He had  multiple episodes of emesis, and his mom notes that his pulse was thready.  His heart rate was 80, but his blood pressure was actually elevated 148/90.  Since then, he has been taking his mother's longer-acting metoprolol, and he has felt well.  Otherwise, he has done well.  He denies baseline dyspnea on exertion.  He denies any recurrent syncope.    I was able to review blood work performed in the emergency room December 11, 2019.  His troponin was normal.  A CBC and CMP were normal.  An EKG revealed sinus tachycardia.  A chest x-ray was reassuring.    Review of Systems   Constitution: Negative.   HENT: Negative.    Eyes: Negative.    Cardiovascular: Negative.    Respiratory: Negative.    Endocrine: Negative.    Hematologic/Lymphatic: Negative.    Skin: Negative.    Musculoskeletal: Negative.    Gastrointestinal: Negative.    Genitourinary: Negative.    Neurological: Negative.    Psychiatric/Behavioral: Negative.    Allergic/Immunologic: Negative.      Past Medical History:   Diagnosis Date    Coarctation of aorta, congenital     Diaphragmatic paralysis     Strabismus     Syncope and collapse 06/2018    TGA/VSD (transposition of great arteries, ventricular septal defect)      Past Surgical History:   Procedure Laterality Date    ABLATION N/A 6/12/2018    Procedure: ABLATION;  Surgeon: Octaviano Ley MD;  Location: [facility] CATH LAB;  Service: Cardiology;  Laterality: N/A;  syncope/SVT, EPS, +/-RFA, +/-ICD, +/-PPM, +/-ILR, Anes AO, DM, 353A (peds congenital)    ARTERIAL SWITCH W/ VENTRICULAR SEPTAL DEFECT CLOSURE      CARDIAC CATHETERIZATION  10/11/07    CARDIAC CATHETERIZATION  1/17/12    CARDIAC ELECTROPHYSIOLOGY STUDY N/A 6/12/2018    Procedure: STUDY-EP;  Surgeon: Octaviano Ley MD;  Location: [facility] CATH LAB;  Service: Cardiology;  Laterality: N/A;    coarctation of aorta repair/with TGA switch      DIAPHRAGM PLICATION      LOOP RECORDER IMPLANT  06/12/2018    STRABISMUS SURGERY       Family  History   Problem Relation Age of Onset    Asthma Brother     Congenital heart disease Neg Hx     Early death Neg Hx      Social History     Socioeconomic History    Marital status: Single     Spouse name: Not on file    Number of children: Not on file    Years of education: Not on file    Highest education level: Not on file   Occupational History    Not on file   Social Needs    Financial resource strain: Not on file    Food insecurity:     Worry: Not on file     Inability: Not on file    Transportation needs:     Medical: Not on file     Non-medical: Not on file   Tobacco Use    Smoking status: Never Smoker    Smokeless tobacco: Never Used   Substance and Sexual Activity    Alcohol use: No    Drug use: No    Sexual activity: Not on file   Lifestyle    Physical activity:     Days per week: Not on file     Minutes per session: Not on file    Stress: Not on file   Relationships    Social connections:     Talks on phone: Not on file     Gets together: Not on file     Attends [employer] service: Not on file     Active member of club or organization: Not on file     Attends meetings of clubs or organizations: Not on file     Relationship status: Not on file   Other Topics Concern    Not on file   Social History Narrative    Just graduated from [employer] with a degree in general studies.  Looking for a job.    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  Current Outpatient Medications on File Prior to Visit   Medication Sig Dispense Refill    lisinopril (PRINIVIL,ZESTRIL) 5 MG tablet TAKE 1 TABLET BY MOUTH DAILY **May Cause Dizziness** 30 tablet 12    metoprolol tartrate (LOPRESSOR) 25 MG tablet TAKE 1/2 TABLET BY MOUTH TWICE DAILY FOR 14 DAYS  0    lisinopril (PRINIVIL,ZESTRIL) 5 MG tablet TAKE 1 TABLET BY MOUTH DAILY **May Cause Dizziness** (Patient not taking: Reported on 12/17/2019) 30 tablet 12     No current facility-administered medications on file prior to visit.  "     Review of patient's allergies indicates:  No Known Allergies    BP (!) 117/59 (BP Location: Left leg, Patient Position: Lying, BP Method: Medium (Automatic))   Pulse 82   Ht 5' 9.65" (1.769 m)   Wt 60 kg (132 lb 4.4 oz)   SpO2 100%   BMI 19.17 kg/m² RL BP 93/52       Objective:    Physical Exam   Constitutional: He is oriented to person, place, and time. He appears well-developed and well-nourished. No distress.   HENT:   Head: Normocephalic and atraumatic.   Right Ear: External ear normal.   Left Ear: External ear normal.   Nose: Nose normal.   Mouth/Throat: Oropharynx is clear and moist. No oropharyngeal exudate.   Eyes: Pupils are equal, round, and reactive to light. Conjunctivae and EOM are normal. Right eye exhibits no discharge. Left eye exhibits no discharge. No scleral icterus.   Neck: Normal range of motion. Neck supple. No JVD present. No tracheal deviation present. No thyromegaly present.   Cardiovascular: Normal rate, regular rhythm, S1 normal, S2 normal and intact distal pulses. Exam reveals no gallop and no friction rub.   Murmur heard.  High-pitched blowing early systolic murmur is present with a grade of 1/6 at the lower left sternal border.  Pulses:       Carotid pulses are 2+ on the right side, and 2+ on the left side.       Radial pulses are 2+ on the right side, and 2+ on the left side.        Femoral pulses are 2+ on the right side, and 2+ on the left side.       Dorsalis pedis pulses are 2+ on the right side, and 2+ on the left side.   2/6 harsh, squeaky murmur at LLSB and apex   Pulmonary/Chest: Effort normal and breath sounds normal. No stridor. No respiratory distress. He has no wheezes. He has no rales. He exhibits no tenderness.   well healed sternotomy.  Loop recorder site healing well.   Abdominal: Soft. Bowel sounds are normal. He exhibits no distension and no mass. There is no tenderness. There is no rebound and no guarding.   Musculoskeletal: Normal range of motion. He " exhibits no edema, tenderness or deformity.   Lymphadenopathy:     He has no cervical adenopathy.   Neurological: He is alert and oriented to person, place, and time. No cranial nerve deficit. He exhibits normal muscle tone. Coordination normal.   Skin: Skin is warm and dry. He is not diaphoretic.   Psychiatric: He has a normal mood and affect. His behavior is normal. Judgment and thought content normal.         An EKG performed in clinic today reveals normal sinus rhythm with a right bundle branch block.  It is unchanged.    I reviewed his recent CPX testing.    He had an echocardiogram September 2019:  Normal left ventricle structure and size.  The right ventricle appears to be of normal size.  Normal left ventricular systolic function.  Normal right ventricular systolic function.  There is decreased motion of the interventricular septum noted.  No pericardial effusion.  Trivial tricuspid valve insufficiency.  Right ventricle systolic pressure estimate normal.  Normal pulmonic valve velocity.  Mild pulmonic valve insufficiency.  The pulmonary artery branches could not be visualized / interrogated.  No mitral valve insufficiency.  Normal aortic valve velocity.  Trivial aortic valve insufficiency.  Descending aorta peak gradient measures 22 mm Hg.  Descending aorta mean gradient measures 13 mm Hg.  No drag in descending aorta.    Cardiac MRI performed October 22, 2019:  Conclusion: D-TGA with arterial switch. History of Coarcatation stent. Dilated aortic root  1. Increased RV diastolic volume with RVEF of 44%  2. There is mild PI with peak velocity through the valve of 1.8 m/sec   3. There is no stenosis of the right or left PA. There is equal flow through both PA.  4. Increased LV volumes with LVEF of 46%  5. Aortic valve appears tricuspid with trivial to mild AI  6. The sinus of Valsalva mildly dilated measures 39 to 40 mm depending on the method.    7. Ascending aorta measures 25 mm  8. Good visualization of the  stent in the descending aorta without significant stenosis (both on angio and candy cane black blood images)    CTA June 2018:  1.  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  No stenosis identified in the reimplanted coronary arteries.  2.  Possible thickening of the anterior leaflet of the mitral valve; please correlate with echocardiography.  3.  I suspect perimembranous VSD patch.  4.  Measurements are provided for the aorta and pulmonary arteries.  Stent in the proximal descending aorta represents treatment for coarctation.  5.  Arch and isthmus are incompletely included on this study diverted to assessment for coronary artery anatomy and caliber.    EP study 6/12/18:  1. Not inducible for sustained VT or VF with up to 3 VES, from 2 different RV sites, in the baseline state or with isuprel infusion.  2. Normal AH, HV.  3. Normal CSNRT.  4. Normal response to procainamide challenge.  5. Not inducible for sustained SVT with up to double AES.  6. Successful placement, and later removal, of a right femoral arterial sheath for BP monitoring.    CPX 2016:  Moderate functional impairment associated with a borderline reduced breathing reserve, normal oxygen saturation, a good effort, and a borderline reduced AT. These findings are indicative of functional impairment secondary to deconditioning and possible   ventilatory impairment.  4) The PkVO2 was 29.9 ml/kg/min which is 62% of predicted equating to a functional capacity of 8.5 METS indicating moderate functional impairment    I reviewed the cardiac catheterization report from January 12, 2012:  1.  A small residual left ventricle to right atrial shunt was noted.  2.  The coarctation was stented with a 26 x 10 Tito LD stent  3.  There was about a 14-16 mmHg gradient from the right ventricle into the branch pulmonary arteries    A cardiac catheterization in 2007 revealed normal coronary  arteries.     Thank you for referring this patient to our clinic.  Please call with any questions.    Sincerely,        Patrick Godfrey MD  Pediatric Cardiology  Adult Congenital Heart Disease  Pediatric Heart Failure and Transplantation  [facility] [address] [place]  [address]  [phone]

## 2019-12-18 ENCOUNTER — OFFICE VISIT (OUTPATIENT)
Dept: ELECTROPHYSIOLOGY | Facility: CLINIC | Age: 24
End: 2019-12-18
Payer: COMMERCIAL

## 2019-12-18 VITALS
HEART RATE: 85 BPM | SYSTOLIC BLOOD PRESSURE: 122 MMHG | HEIGHT: 69 IN | BODY MASS INDEX: 19.95 KG/M2 | WEIGHT: 134.69 LBS | DIASTOLIC BLOOD PRESSURE: 64 MMHG

## 2019-12-18 DIAGNOSIS — I49.8 OTHER SPECIFIED CARDIAC ARRHYTHMIAS: ICD-10-CM

## 2019-12-18 DIAGNOSIS — I10 ESSENTIAL HYPERTENSION: ICD-10-CM

## 2019-12-18 DIAGNOSIS — Q21.0 VSD (VENTRICULAR SEPTAL DEFECT): Primary | ICD-10-CM

## 2019-12-18 DIAGNOSIS — Q24.9 ADULT CONGENITAL HEART DISEASE: ICD-10-CM

## 2019-12-18 DIAGNOSIS — Q25.1 AORTIC COARCTATION: ICD-10-CM

## 2019-12-18 DIAGNOSIS — Z95.828 S/P COARCTATION STENT: ICD-10-CM

## 2019-12-18 DIAGNOSIS — Z98.890 HISTORY OF LOOP RECORDER: ICD-10-CM

## 2019-12-18 DIAGNOSIS — Z87.74 S/P COARCTATION STENT: ICD-10-CM

## 2019-12-18 PROCEDURE — 93005 ELECTROCARDIOGRAM TRACING: CPT | Mod: S$GLB,,, | Performed by: INTERNAL MEDICINE

## 2019-12-18 PROCEDURE — 93005 RHYTHM STRIP: ICD-10-PCS | Mod: S$GLB,,, | Performed by: INTERNAL MEDICINE

## 2019-12-18 PROCEDURE — 99999 PR PBB SHADOW E&M-EST. PATIENT-LVL III: ICD-10-PCS | Mod: PBBFAC,,, | Performed by: INTERNAL MEDICINE

## 2019-12-18 PROCEDURE — 3074F SYST BP LT 130 MM HG: CPT | Mod: CPTII,S$GLB,, | Performed by: INTERNAL MEDICINE

## 2019-12-18 PROCEDURE — 99214 PR OFFICE/OUTPT VISIT, EST, LEVL IV, 30-39 MIN: ICD-10-PCS | Mod: S$GLB,,, | Performed by: INTERNAL MEDICINE

## 2019-12-18 PROCEDURE — 99214 OFFICE O/P EST MOD 30 MIN: CPT | Mod: S$GLB,,, | Performed by: INTERNAL MEDICINE

## 2019-12-18 PROCEDURE — 3074F PR MOST RECENT SYSTOLIC BLOOD PRESSURE < 130 MM HG: ICD-10-PCS | Mod: CPTII,S$GLB,, | Performed by: INTERNAL MEDICINE

## 2019-12-18 PROCEDURE — 99999 PR PBB SHADOW E&M-EST. PATIENT-LVL III: CPT | Mod: PBBFAC,,, | Performed by: INTERNAL MEDICINE

## 2019-12-18 PROCEDURE — 3008F PR BODY MASS INDEX (BMI) DOCUMENTED: ICD-10-PCS | Mod: CPTII,S$GLB,, | Performed by: INTERNAL MEDICINE

## 2019-12-18 PROCEDURE — 93010 RHYTHM STRIP: ICD-10-PCS | Mod: S$GLB,,, | Performed by: INTERNAL MEDICINE

## 2019-12-18 PROCEDURE — 3078F PR MOST RECENT DIASTOLIC BLOOD PRESSURE < 80 MM HG: ICD-10-PCS | Mod: CPTII,S$GLB,, | Performed by: INTERNAL MEDICINE

## 2019-12-18 PROCEDURE — 3008F BODY MASS INDEX DOCD: CPT | Mod: CPTII,S$GLB,, | Performed by: INTERNAL MEDICINE

## 2019-12-18 PROCEDURE — 93010 ELECTROCARDIOGRAM REPORT: CPT | Mod: S$GLB,,, | Performed by: INTERNAL MEDICINE

## 2019-12-18 PROCEDURE — 3078F DIAST BP <80 MM HG: CPT | Mod: CPTII,S$GLB,, | Performed by: INTERNAL MEDICINE

## 2019-12-18 NOTE — PROGRESS NOTES
Mr. May is a patient of Dr. Ley and was last seen in clinic 9/12/2018.      Subjective:   Patient ID:  Livia May is a 24 y.o. male who presents for follow-up of Shortness of Breath  .     HPI:    Mr. May is a 24 y.o. male with TGA and VSD, coarctation of aorta, syncope, ILR here for annual follow up.     Background:    TGA and VSD, s/p arterial switch and VSD closure, with residual LV->RA shunt  Ao coarctation -> stented 2012    Had syncope, thought vasovagal (during fingernail clipping).  Extensive workup, including EPS 6/18, unrevealing. ILR placed out of abundance of caution.  No recurrence. Feels well.    While dealing with a very emotional/stressful occurrence recently, developed anxiety and palpitations. ILR shows ST, 125-133 bpm. Clear P waves, and gradual ramp-up, cool-down.    I have personally reviewed the patient's EKG today, which shows sinus rhythm with RBBB at 85   Recent Cardiac Tests:      Review of Systems   Constitution: Negative. Negative for malaise/fatigue.   HENT: Negative.  Negative for ear pain and tinnitus.    Eyes: Negative for blurred vision.   Cardiovascular: Positive for palpitations. Negative for chest pain, dyspnea on exertion, irregular heartbeat, leg swelling, near-syncope and syncope.   Respiratory: Negative.  Negative for shortness of breath.    Endocrine: Negative.  Negative for polyuria.   Hematologic/Lymphatic: Negative for bleeding problem. Does not bruise/bleed easily.   Skin: Negative.  Negative for rash.   Musculoskeletal: Negative.  Negative for joint pain, muscle weakness and myalgias.   Gastrointestinal: Negative.  Negative for abdominal pain, change in bowel habit, hematemesis, hematochezia and nausea.   Genitourinary: Negative for frequency and hematuria.   Neurological: Negative.  Negative for dizziness, light-headedness and weakness.   Psychiatric/Behavioral: Negative.  Negative for altered mental status and depression. The patient is not nervous/anxious.   "  Allergic/Immunologic: Negative for environmental allergies and persistent infections.         Objective:          /64   Pulse 85   Ht 5' 9" (1.753 m)   Wt 61.1 kg (134 lb 11.2 oz)   BMI 19.89 kg/m²     Physical Exam   Constitutional: He is oriented to person, place, and time. He appears well-developed and well-nourished.   HENT:   Head: Normocephalic and atraumatic.   Nose: Nose normal.   Eyes: Pupils are equal, round, and reactive to light. Conjunctivae, EOM and lids are normal. No scleral icterus.   Neck: Normal range of motion. No JVD present. No tracheal deviation present. No thyromegaly present.   Cardiovascular: Normal rate, regular rhythm, S1 normal, S2 normal and normal heart sounds.  No extrasystoles are present. PMI is not displaced. Exam reveals no gallop and no friction rub.   No murmur heard.  Pulses:       Radial pulses are 2+ on the right side, and 2+ on the left side.   Pulmonary/Chest: Effort normal and breath sounds normal. No accessory muscle usage. No tachypnea. No respiratory distress. He has no wheezes. He has no rales.   Abdominal: Soft. Normal appearance and bowel sounds are normal. He exhibits no distension. There is no hepatosplenomegaly. There is no tenderness.   Musculoskeletal: Normal range of motion. He exhibits no edema.   Neurological: He is alert and oriented to person, place, and time. He has normal reflexes. He exhibits normal muscle tone.   Skin: Skin is warm and dry. No rash noted. No erythema.   Psychiatric: He has a normal mood and affect. His speech is normal and behavior is normal.   Nursing note and vitals reviewed.    Lab Results   Component Value Date     06/13/2018    K 3.8 06/13/2018    MG 2.4 06/09/2018    BUN 13 06/13/2018    CREATININE 0.7 06/13/2018    ALT 16 06/09/2018    AST 14 06/09/2018    HGB 15.1 06/11/2018    HCT 44.7 06/11/2018    TSH 0.877 06/09/2018       Recent Labs   Lab 06/09/18  0617 06/11/18  0459   INR 1.2 1.1       Assessment: "     1. VSD (ventricular septal defect)    2. Other specified cardiac arrhythmias    3. Aortic coarctation    4. S/P coarctation stent    5. Essential hypertension    6. Adult congenital heart disease    7. History of loop recorder      Plan:     In summary, Mr. May is a 24 y.o. male with TGA and VSD, coarctation of aorta, syncope, ILR here for annual follow up.   Reporting rare palpitations. No auto capture episodes on ILR. One symptom episode from March 2019 associated with SVT vs ST in 130s while active, and one also for ST during emotional upset.    Clearly sinus tachycardia. Reassurance given.  Agree with low-dose BB; could use prn.  Consider psychiatry consultation if anxiety becomes a common/chronic problem.  f/u 1 year or earlier prn.

## 2019-12-26 ENCOUNTER — CLINICAL SUPPORT (OUTPATIENT)
Dept: CARDIOLOGY | Facility: HOSPITAL | Age: 24
End: 2019-12-26
Attending: INTERNAL MEDICINE
Payer: COMMERCIAL

## 2019-12-26 DIAGNOSIS — Z46.9 FITTING AND ADJUSTMENT OF DEVICE: ICD-10-CM

## 2019-12-26 PROCEDURE — 93298 REM INTERROG DEV EVAL SCRMS: CPT | Mod: ,,, | Performed by: INTERNAL MEDICINE

## 2019-12-26 PROCEDURE — 93298 CARDIAC DEVICE CHECK - REMOTE: ICD-10-PCS | Mod: ,,, | Performed by: INTERNAL MEDICINE

## 2019-12-26 PROCEDURE — 93299 CARDIAC DEVICE CHECK - REMOTE: CPT | Performed by: INTERNAL MEDICINE

## 2020-01-06 RX ORDER — LISINOPRIL 5 MG/1
TABLET ORAL
Qty: 30 TABLET | Refills: 12 | Status: SHIPPED | OUTPATIENT
Start: 2020-01-06 | End: 2021-06-24 | Stop reason: SDUPTHER

## 2020-01-25 ENCOUNTER — CLINICAL SUPPORT (OUTPATIENT)
Dept: CARDIOLOGY | Facility: HOSPITAL | Age: 25
End: 2020-01-25
Payer: COMMERCIAL

## 2020-01-25 DIAGNOSIS — Z95.818 PRESENCE OF OTHER CARDIAC IMPLANTS AND GRAFTS: ICD-10-CM

## 2020-01-25 PROCEDURE — G2066 INTER DEVC REMOTE 30D: HCPCS | Performed by: INTERNAL MEDICINE

## 2020-01-25 PROCEDURE — 93298 REM INTERROG DEV EVAL SCRMS: CPT | Mod: ,,, | Performed by: INTERNAL MEDICINE

## 2020-01-25 PROCEDURE — 93298 CARDIAC DEVICE CHECK - REMOTE: ICD-10-PCS | Mod: ,,, | Performed by: INTERNAL MEDICINE

## 2020-02-24 ENCOUNTER — CLINICAL SUPPORT (OUTPATIENT)
Dept: CARDIOLOGY | Facility: HOSPITAL | Age: 25
End: 2020-02-24
Attending: INTERNAL MEDICINE
Payer: COMMERCIAL

## 2020-02-24 DIAGNOSIS — Z46.9 FITTING AND ADJUSTMENT OF DEVICE: ICD-10-CM

## 2020-02-24 PROCEDURE — G2066 INTER DEVC REMOTE 30D: HCPCS | Performed by: INTERNAL MEDICINE

## 2020-02-24 PROCEDURE — 93298 REM INTERROG DEV EVAL SCRMS: CPT | Mod: ,,, | Performed by: INTERNAL MEDICINE

## 2020-02-24 PROCEDURE — 93298 CARDIAC DEVICE CHECK - REMOTE: ICD-10-PCS | Mod: ,,, | Performed by: INTERNAL MEDICINE

## 2020-03-25 ENCOUNTER — CLINICAL SUPPORT (OUTPATIENT)
Dept: CARDIOLOGY | Facility: HOSPITAL | Age: 25
End: 2020-03-25
Attending: INTERNAL MEDICINE
Payer: COMMERCIAL

## 2020-03-25 DIAGNOSIS — Z46.9 FITTING AND ADJUSTMENT OF DEVICE: ICD-10-CM

## 2020-03-25 PROCEDURE — G2066 INTER DEVC REMOTE 30D: HCPCS | Performed by: INTERNAL MEDICINE

## 2020-03-25 PROCEDURE — 93298 REM INTERROG DEV EVAL SCRMS: CPT | Mod: ,,, | Performed by: INTERNAL MEDICINE

## 2020-03-25 PROCEDURE — 93298 CARDIAC DEVICE CHECK - REMOTE: ICD-10-PCS | Mod: ,,, | Performed by: INTERNAL MEDICINE

## 2020-04-08 ENCOUNTER — TELEPHONE (OUTPATIENT)
Dept: PEDIATRIC CARDIOLOGY | Facility: CLINIC | Age: 25
End: 2020-04-08

## 2020-04-08 NOTE — TELEPHONE ENCOUNTER
Explained to mom to continue to follow CDC guidelines: strict handwashing, wear a mask, social distancing.   ----- Message from Sergo Anne sent at 4/8/2020  8:26 AM CDT -----  Contact: Mom [phone]  Type:  Needs Medical Advice    Who Called: Mom     Would the patient rather a call back or a response via [facility]? Call back     Best Call Back Number: [phone]    Additional Information: Mom [phone]----calling to spk with the nurse regarding the pt. 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 Mom also states that she needs medical advice on if he should just go to work and use social distance. Mom is requesting a call back

## 2020-04-24 ENCOUNTER — CLINICAL SUPPORT (OUTPATIENT)
Dept: CARDIOLOGY | Facility: HOSPITAL | Age: 25
End: 2020-04-24
Attending: INTERNAL MEDICINE
Payer: COMMERCIAL

## 2020-04-24 DIAGNOSIS — Z46.9 FITTING AND ADJUSTMENT OF DEVICE: ICD-10-CM

## 2020-04-24 PROCEDURE — 93298 REM INTERROG DEV EVAL SCRMS: CPT | Mod: ,,, | Performed by: INTERNAL MEDICINE

## 2020-04-24 PROCEDURE — 93298 CARDIAC DEVICE CHECK - REMOTE: ICD-10-PCS | Mod: ,,, | Performed by: INTERNAL MEDICINE

## 2020-04-24 PROCEDURE — G2066 INTER DEVC REMOTE 30D: HCPCS | Performed by: INTERNAL MEDICINE

## 2020-05-24 ENCOUNTER — CLINICAL SUPPORT (OUTPATIENT)
Dept: CARDIOLOGY | Facility: HOSPITAL | Age: 25
End: 2020-05-24
Attending: INTERNAL MEDICINE
Payer: COMMERCIAL

## 2020-05-24 DIAGNOSIS — Z95.818 PRESENCE OF OTHER CARDIAC IMPLANTS AND GRAFTS: ICD-10-CM

## 2020-05-24 DIAGNOSIS — Z46.9 FITTING AND ADJUSTMENT OF DEVICE: ICD-10-CM

## 2020-05-24 PROCEDURE — G2066 INTER DEVC REMOTE 30D: HCPCS | Performed by: INTERNAL MEDICINE

## 2020-05-24 PROCEDURE — 93298 CARDIAC DEVICE CHECK - REMOTE: ICD-10-PCS | Mod: ,,, | Performed by: INTERNAL MEDICINE

## 2020-05-24 PROCEDURE — 93298 REM INTERROG DEV EVAL SCRMS: CPT | Mod: ,,, | Performed by: INTERNAL MEDICINE

## 2020-05-28 ENCOUNTER — TELEPHONE (OUTPATIENT)
Dept: PEDIATRIC CARDIOLOGY | Facility: CLINIC | Age: 25
End: 2020-05-28

## 2020-06-25 ENCOUNTER — CLINICAL SUPPORT (OUTPATIENT)
Dept: CARDIOLOGY | Facility: HOSPITAL | Age: 25
End: 2020-06-25
Attending: INTERNAL MEDICINE
Payer: COMMERCIAL

## 2020-06-25 DIAGNOSIS — Z46.9 FITTING AND ADJUSTMENT OF DEVICE: ICD-10-CM

## 2020-06-25 DIAGNOSIS — Z95.818 PRESENCE OF OTHER CARDIAC IMPLANTS AND GRAFTS: ICD-10-CM

## 2020-06-25 PROCEDURE — 93298 CARDIAC DEVICE CHECK - REMOTE: ICD-10-PCS | Mod: ,,, | Performed by: INTERNAL MEDICINE

## 2020-06-25 PROCEDURE — 93298 REM INTERROG DEV EVAL SCRMS: CPT | Mod: ,,, | Performed by: INTERNAL MEDICINE

## 2020-06-25 PROCEDURE — G2066 INTER DEVC REMOTE 30D: HCPCS | Performed by: INTERNAL MEDICINE

## 2020-07-25 ENCOUNTER — CLINICAL SUPPORT (OUTPATIENT)
Dept: CARDIOLOGY | Facility: HOSPITAL | Age: 25
End: 2020-07-25
Attending: INTERNAL MEDICINE
Payer: COMMERCIAL

## 2020-07-25 DIAGNOSIS — Z46.9 FITTING AND ADJUSTMENT OF DEVICE: ICD-10-CM

## 2020-07-25 DIAGNOSIS — Z95.818 PRESENCE OF OTHER CARDIAC IMPLANTS AND GRAFTS: ICD-10-CM

## 2020-07-25 PROCEDURE — G2066 INTER DEVC REMOTE 30D: HCPCS | Performed by: INTERNAL MEDICINE

## 2020-07-25 PROCEDURE — 93298 CARDIAC DEVICE CHECK - REMOTE: ICD-10-PCS | Mod: ,,, | Performed by: INTERNAL MEDICINE

## 2020-07-25 PROCEDURE — 93298 REM INTERROG DEV EVAL SCRMS: CPT | Mod: ,,, | Performed by: INTERNAL MEDICINE

## 2020-08-11 DIAGNOSIS — Q24.9 ADULT CONGENITAL HEART DISEASE: ICD-10-CM

## 2020-08-11 DIAGNOSIS — Z95.828 S/P COARCTATION STENT: ICD-10-CM

## 2020-08-11 DIAGNOSIS — Z87.74 S/P COARCTATION STENT: ICD-10-CM

## 2020-08-11 DIAGNOSIS — Q21.0 VSD (VENTRICULAR SEPTAL DEFECT): Primary | ICD-10-CM

## 2020-08-11 DIAGNOSIS — Q25.1 AORTIC COARCTATION: ICD-10-CM

## 2020-08-11 DIAGNOSIS — Z98.890 PERSONAL HISTORY OF SURGERY TO HEART AND GREAT VESSELS, PRESENTING HAZARDS TO HEALTH: ICD-10-CM

## 2020-08-17 ENCOUNTER — TELEPHONE (OUTPATIENT)
Dept: CARDIOLOGY | Facility: HOSPITAL | Age: 25
End: 2020-08-17

## 2020-08-17 NOTE — TELEPHONE ENCOUNTER
----- Message from Octaviano Ley MD sent at 8/17/2020  2:13 PM CDT -----  Regarding: RE: ILR at RRT  schedule removal please  ----- Message -----  From: aHrriet Whiteside  Sent: 8/17/2020  12:52 PM CDT  To: Octaviano Ley MD, Mikki Abebe RN  Subject: ILR at RRT                                       Daxa Garcíae ILR at RRT.    -Harriet       Message left on machine for patient to call back.

## 2020-08-24 ENCOUNTER — CLINICAL SUPPORT (OUTPATIENT)
Dept: CARDIOLOGY | Facility: HOSPITAL | Age: 25
End: 2020-08-24
Attending: INTERNAL MEDICINE
Payer: COMMERCIAL

## 2020-08-24 DIAGNOSIS — Z95.818 PRESENCE OF OTHER CARDIAC IMPLANTS AND GRAFTS: ICD-10-CM

## 2020-08-24 PROCEDURE — 93298 CARDIAC DEVICE CHECK - REMOTE: ICD-10-PCS | Mod: ,,, | Performed by: INTERNAL MEDICINE

## 2020-08-24 PROCEDURE — G2066 INTER DEVC REMOTE 30D: HCPCS | Performed by: INTERNAL MEDICINE

## 2020-08-24 PROCEDURE — 93298 REM INTERROG DEV EVAL SCRMS: CPT | Mod: ,,, | Performed by: INTERNAL MEDICINE

## 2020-09-10 ENCOUNTER — TELEPHONE (OUTPATIENT)
Dept: ELECTROPHYSIOLOGY | Facility: CLINIC | Age: 25
End: 2020-09-10

## 2020-09-10 NOTE — TELEPHONE ENCOUNTER
Spoke with patient too schedule ILR removal on 10/19/2020. Instructed npo after midnight, arrival time of 1300 to sscu. Instructed that there will be lab work and a covid test prior and that one person could accompany him. Patient verbalizes understanding.

## 2020-09-14 DIAGNOSIS — Z98.890 HISTORY OF LOOP RECORDER: ICD-10-CM

## 2020-09-14 DIAGNOSIS — R55 VASOVAGAL SYNCOPE: ICD-10-CM

## 2020-09-14 DIAGNOSIS — Z01.818 PRE-OP EXAM: ICD-10-CM

## 2020-09-14 DIAGNOSIS — Z95.818 PRESENCE OF OTHER CARDIAC IMPLANTS AND GRAFTS: Primary | ICD-10-CM

## 2020-09-15 ENCOUNTER — TELEPHONE (OUTPATIENT)
Dept: ELECTROPHYSIOLOGY | Facility: CLINIC | Age: 25
End: 2020-09-15

## 2020-09-15 NOTE — TELEPHONE ENCOUNTER
September 15, 2020    Livia May  [address]  [place] [address]             [facility] [id number]  [address]  [place] [address]  Phone: [phone]  Fax: [phone] Dear Mr Livia May:    Patient Instructions  Removal of Loop Recorder       Pre-Procedure Testing:      Blood Work   October 17, 2020 at [facility], slip enclosed    · You do NOT need to fast for this blood work.        - COVID 19 Nasal Swab  Scheduled October 17, 2020 in [facility], slip enclosed    · You do NOT need to fast for this.       Important Medication Information:  .  · You may take your other usual morning medications with a sip of water on the day of your procedure.       **Prior to your procedure please check your skin thoroughly for any signs or symptoms of infection such as rashes, open sores, yeast infection, or heat rashes.  If you have any concerns with your skin you should be evaluated by your Primary Care Provider or an Urgent Care for treatment prior to your procedure. Your procedure will be cancelled if not treated prior to the day of your procedure.**      Day of Procedure:    October 19,2020   [facility] [address] [place] [address]  Please report to Cardiology Waiting Room on the 3rd floor of the Hospital,      · Do not eat or drink anything after 12 midnight on the night before your procedure.  · Wash your chest with HIBICLENS OR AN ANTIBACTERIAL SOAP (such as Dial) on the night before and the morning of your procedure.  · Please do not wear makeup, especially mascara, when arriving for your procedure.  · You will be going home after your procedure. You are allowed one adult guest to accompany you pre and post procedure.       Directions to Cardiology Waiting Room  If you park in the Parking Garage:  Take elevators to the 2nd floor  Walk up ramp and turn right by Gold Elevators  Take elevator to the 3rd floor  Upon exiting the  elevator, turn away from the clinic areas  Walk long herrera around to front of hospital [facility]  Check in at Reception Desk  OR  If family is dropping you off:  Have them drop you off at the front of the Hospital  (Near the ER, where all the flags are hung).  Take the E elevators to the 3rd floor.  Check in at the Reception Desk in the waiting room.          Post-Procedure Patient Discharge Instructions  Loop Recorder Removal    Wound Care   If you are discharged with a standard dressing over the incision, you may remove the dressing after 24 hours.    If there are Steri-strips (strips of tape) over your incision, leave them on until your follow-up appointment. They may begin to fall off on their own, which is normal. If there is Dermabond (clear glue) over your incision, do not scrub it off. It acts as a barrier and will eventually disappear.   You may be discharged with 5 days of oral antibiotics. Please take the full prescription until it is gone.   Do not get the incision wet for 48 hours following the procedure. You may sponge bath during this period, working around the incision. After 48 hours, you may shower, but you should still try to keep this area as dry as possible, and avoid direct water contact to the incision (allow the water to hit back of your shoulder rather than directly on the incision). Gently pat the incision dry if it does get wet.   You may take regular showers after 2 weeks, unless otherwise indicated at your follow-up visit.   Do not submerge the incision in a tub, pool, or body of water for 6 weeks.   If you notice unusual swelling, redness, drainage, have more device site pain, chest pain, shortness of breath, or have a fever greater than 100 degrees, call our device clinic immediately: [phone] or [phone] during normal office hours. You may call [phone] after-hours or on weekends and ask for the electrophysiologist on  call.  Activity    If you were driving prior to the procedure, you may resume driving right after your procedure (as long you did not receive any sedation). If you have a history of passing out or a history of certain arrhythmias, there may be driving restrictions unrelated to the procedure. Please clarify with your physician if this is the case.   Avoid rough contact at the incision site for 6 weeks.   You may participate in sexual activity unless otherwise instructed.   You may return to work the follow day unless told otherwise by your provider.      Any need to reschedule or cancel procedures, or any questions regarding your procedures should be addressed directly with the Arrhythmia Department Nurses at the following phone number: [phone].    If you have any questions or concerns, please don't hesitate to call.    Sincerely,    Shelby Sung    [email or web address]

## 2020-09-15 NOTE — TELEPHONE ENCOUNTER
Spoke with mother who is requesting lab work be drawn at the urgent care in [place]. Appointment reschedule, slip mailed

## 2020-09-15 NOTE — TELEPHONE ENCOUNTER
Attempted to call patient. , voice message left to call Shelby juares. Appointments made, instructions printed and mailed to patient    Dear Mr. Livia May:    Patient Instructions  Removal of Loop Recorder       Pre-Procedure Testing:     10/16/2020 - Blood Work   At 750 am at [facility] (slip enclosed)    · You do NOT need to fast for this blood work.       *10/17/2020 - COVID 19 Nasal Swab  At [facility], slip enclosed    · You do NOT need to fast for this.       Important Medication Information:    · You may take your other usual morning medications with a sip of water on the day of your procedure.       **Prior to your procedure please check your skin thoroughly for any signs or symptoms of infection such as rashes, open sores, yeast infection, or heat rashes.  If you have any concerns with your skin you should be evaluated by your Primary Care Provider or an Urgent Care for treatment prior to your procedure. Your procedure will be cancelled if not treated prior to the day of your procedure.**      Day of Procedure:    October 19, 2020  [facility] - [address] [place] [id number]  Please report to Cardiology Waiting Room on the 3rd floor of the Hospital,      · Do not eat or drink anything after 12 midnight on the night before your procedure.  · Wash your chest with HIBICLENS OR AN ANTIBACTERIAL SOAP (such as Dial) on the night before and the morning of your procedure.  · Please do not wear makeup, especially mascara, when arriving for your procedure.  · You will be going home after your procedure. You are allowed one adult guest to accompany you pre and post procedure.       Directions to Cardiology Waiting Room  If you park in the Parking Garage:  Take elevators to the 2nd floor  Walk up ramp and turn right by Gold Elevators  Take elevator to the 3rd floor  Upon exiting the elevator, turn away from the clinic areas  Walk long herrera around to front of hospital to area  with windows overlooking [facility]  Check in at Reception Desk  OR  If family is dropping you off:  Have them drop you off at the front of the Hospital  (Near the ER, where all the flags are hung).  Take the E elevators to the 3rd floor.  Check in at the Reception Desk in the waiting room.          Post-Procedure Patient Discharge Instructions  Loop Recorder Removal    Wound Care   If you are discharged with a standard dressing over the incision, you may remove the dressing after 24 hours.    If there are Steri-strips (strips of tape) over your incision, leave them on until your follow-up appointment. They may begin to fall off on their own, which is normal. If there is Dermabond (clear glue) over your incision, do not scrub it off. It acts as a barrier and will eventually disappear.   You may be discharged with 5 days of oral antibiotics. Please take the full prescription until it is gone.   Do not get the incision wet for 48 hours following the procedure. You may sponge bath during this period, working around the incision. After 48 hours, you may shower, but you should still try to keep this area as dry as possible, and avoid direct water contact to the incision (allow the water to hit back of your shoulder rather than directly on the incision). Gently pat the incision dry if it does get wet.   You may take regular showers after 2 weeks, unless otherwise indicated at your follow-up visit.   Do not submerge the incision in a tub, pool, or body of water for 6 weeks.   If you notice unusual swelling, redness, drainage, have more device site pain, chest pain, shortness of breath, or have a fever greater than 100 degrees, call our device clinic immediately: [phone] or [phone] during normal office hours. You may call [phone] after-hours or on weekends and ask for the electrophysiologist on call.  Activity    If you were driving prior to the procedure, you may resume driving right  after your procedure (as long you did not receive any sedation). If you have a history of passing out or a history of certain arrhythmias, there may be driving restrictions unrelated to the procedure. Please clarify with your physician if this is the case.   Avoid rough contact at the incision site for 6 weeks.   You may participate in sexual activity unless otherwise instructed.   You may return to work the follow day unless told otherwise by your provider.      Any need to reschedule or cancel procedures, or any questions regarding your procedures should be addressed directly with the Arrhythmia Department Nurses at the following phone number: [phone].    If you have any questions or concerns, please don't hesitate to call.    Sincerely,    Shelby Sung

## 2020-09-17 ENCOUNTER — HOSPITAL ENCOUNTER (OUTPATIENT)
Dept: CARDIOLOGY | Facility: CLINIC | Age: 25
Discharge: HOME | End: 2020-09-17
Payer: COMMERCIAL

## 2020-09-17 ENCOUNTER — HOSPITAL ENCOUNTER (OUTPATIENT)
Dept: CARDIOLOGY | Facility: HOSPITAL | Age: 25
Discharge: HOME | End: 2020-09-17
Attending: PEDIATRICS
Payer: COMMERCIAL

## 2020-09-17 ENCOUNTER — OFFICE VISIT (OUTPATIENT)
Dept: CARDIOLOGY | Facility: CLINIC | Age: 25
End: 2020-09-17
Payer: COMMERCIAL

## 2020-09-17 VITALS
BODY MASS INDEX: 19.59 KG/M2 | WEIGHT: 134 LBS | DIASTOLIC BLOOD PRESSURE: 80 MMHG | OXYGEN SATURATION: 100 % | HEIGHT: 69 IN | SYSTOLIC BLOOD PRESSURE: 148 MMHG | HEIGHT: 69 IN | WEIGHT: 132.25 LBS | HEART RATE: 79 BPM | HEART RATE: 83 BPM | BODY MASS INDEX: 19.85 KG/M2

## 2020-09-17 DIAGNOSIS — Q24.9 ADULT CONGENITAL HEART DISEASE: Primary | ICD-10-CM

## 2020-09-17 DIAGNOSIS — Q24.9 ADULT CONGENITAL HEART DISEASE: ICD-10-CM

## 2020-09-17 DIAGNOSIS — R55 VASOVAGAL SYNCOPE: ICD-10-CM

## 2020-09-17 DIAGNOSIS — Z95.828 S/P COARCTATION STENT: ICD-10-CM

## 2020-09-17 DIAGNOSIS — Z98.890 PERSONAL HISTORY OF SURGERY TO HEART AND GREAT VESSELS, PRESENTING HAZARDS TO HEALTH: ICD-10-CM

## 2020-09-17 DIAGNOSIS — Q25.1 AORTIC COARCTATION: ICD-10-CM

## 2020-09-17 DIAGNOSIS — I10 ESSENTIAL HYPERTENSION: ICD-10-CM

## 2020-09-17 DIAGNOSIS — Q21.0 VSD (VENTRICULAR SEPTAL DEFECT): ICD-10-CM

## 2020-09-17 DIAGNOSIS — Z87.74 S/P COARCTATION STENT: ICD-10-CM

## 2020-09-17 LAB
ASCENDING AORTA: 2.49 CM
AV INDEX (PROSTH): 0.98
AV MEAN GRADIENT: 2 MMHG
AV PEAK GRADIENT: 3 MMHG
AV VALVE AREA: 3.6 CM2
AV VELOCITY RATIO: 0.96
BSA FOR ECHO PROCEDURE: 1.72 M2
CV ECHO LV RWT: 0.35 CM
DOP CALC AO PEAK VEL: 0.82 M/S
DOP CALC AO VTI: 15.72 CM
DOP CALC LVOT AREA: 3.7 CM2
DOP CALC LVOT DIAMETER: 2.17 CM
DOP CALC LVOT PEAK VEL: 0.79 M/S
DOP CALC LVOT STROKE VOLUME: 56.67 CM3
DOP CALCLVOT PEAK VEL VTI: 15.33 CM
E WAVE DECELERATION TIME: 116.61 MSEC
E/A RATIO: 3.54
E/E' RATIO: 9.43 M/S
ECHO LV POSTERIOR WALL: 0.82 CM (ref 0.6–1.1)
FRACTIONAL SHORTENING: 30 % (ref 28–44)
INTERVENTRICULAR SEPTUM: 0.93 CM (ref 0.6–1.1)
LA MAJOR: 4 CM
LA MINOR: 2.91 CM
LA WIDTH: 2.95 CM
LEFT ATRIUM SIZE: 2.6 CM
LEFT ATRIUM VOLUME INDEX: 12.6 ML/M2
LEFT ATRIUM VOLUME: 21.96 CM3
LEFT INTERNAL DIMENSION IN SYSTOLE: 3.34 CM (ref 2.1–4)
LEFT VENTRICLE DIASTOLIC VOLUME INDEX: 60.21 ML/M2
LEFT VENTRICLE DIASTOLIC VOLUME: 104.91 ML
LEFT VENTRICLE MASS INDEX: 80 G/M2
LEFT VENTRICLE SYSTOLIC VOLUME INDEX: 26.1 ML/M2
LEFT VENTRICLE SYSTOLIC VOLUME: 45.39 ML
LEFT VENTRICULAR INTERNAL DIMENSION IN DIASTOLE: 4.75 CM (ref 3.5–6)
LEFT VENTRICULAR MASS: 139.92 G
LV LATERAL E/E' RATIO: 7.07 M/S
LV SEPTAL E/E' RATIO: 14.14 M/S
MV PEAK A VEL: 0.28 M/S
MV PEAK E VEL: 0.99 M/S
MV STENOSIS PRESSURE HALF TIME: 33.82 MS
MV VALVE AREA P 1/2 METHOD: 6.51 CM2
PISA TR MAX VEL: 2.97 M/S
RA MAJOR: 4.09 CM
RA WIDTH: 4 CM
RIGHT VENTRICULAR END-DIASTOLIC DIMENSION: 4.31 CM
RV TISSUE DOPPLER FREE WALL SYSTOLIC VELOCITY 1 (APICAL 4 CHAMBER VIEW): 7.15 CM/S
SINUS: 3.68 CM
STJ: 2.46 CM
TDI LATERAL: 0.14 M/S
TDI SEPTAL: 0.07 M/S
TDI: 0.11 M/S
TR MAX PG: 35 MMHG
TRICUSPID ANNULAR PLANE SYSTOLIC EXCURSION: 1.85 CM

## 2020-09-17 PROCEDURE — 99214 PR OFFICE/OUTPT VISIT, EST, LEVL IV, 30-39 MIN: ICD-10-PCS | Mod: 25,S$GLB,, | Performed by: PEDIATRICS

## 2020-09-17 PROCEDURE — 93325 DOPPLER ECHO COLOR FLOW MAPG: CPT | Mod: 26,,, | Performed by: PEDIATRICS

## 2020-09-17 PROCEDURE — 93005 ELECTROCARDIOGRAM TRACING: CPT | Mod: S$GLB,,, | Performed by: PEDIATRICS

## 2020-09-17 PROCEDURE — 93010 EKG 12-LEAD: ICD-10-PCS | Mod: S$GLB,,, | Performed by: INTERNAL MEDICINE

## 2020-09-17 PROCEDURE — 3074F PR MOST RECENT SYSTOLIC BLOOD PRESSURE < 130 MM HG: ICD-10-PCS | Mod: CPTII,S$GLB,, | Performed by: PEDIATRICS

## 2020-09-17 PROCEDURE — 93325 ECHO (CUPID ONLY): ICD-10-PCS | Mod: 26,,, | Performed by: PEDIATRICS

## 2020-09-17 PROCEDURE — 3079F DIAST BP 80-89 MM HG: CPT | Mod: CPTII,S$GLB,, | Performed by: PEDIATRICS

## 2020-09-17 PROCEDURE — 93320 DOPPLER ECHO COMPLETE: CPT | Mod: 26,,, | Performed by: PEDIATRICS

## 2020-09-17 PROCEDURE — 93303 ECHO (CUPID ONLY): ICD-10-PCS | Mod: 26,,, | Performed by: PEDIATRICS

## 2020-09-17 PROCEDURE — 99214 OFFICE O/P EST MOD 30 MIN: CPT | Mod: 25,S$GLB,, | Performed by: PEDIATRICS

## 2020-09-17 PROCEDURE — 99999 PR PBB SHADOW E&M-EST. PATIENT-LVL III: ICD-10-PCS | Mod: PBBFAC,,, | Performed by: PEDIATRICS

## 2020-09-17 PROCEDURE — 93005 EKG 12-LEAD: ICD-10-PCS | Mod: S$GLB,,, | Performed by: PEDIATRICS

## 2020-09-17 PROCEDURE — 3008F BODY MASS INDEX DOCD: CPT | Mod: CPTII,S$GLB,, | Performed by: PEDIATRICS

## 2020-09-17 PROCEDURE — 3008F PR BODY MASS INDEX (BMI) DOCUMENTED: ICD-10-PCS | Mod: CPTII,S$GLB,, | Performed by: PEDIATRICS

## 2020-09-17 PROCEDURE — 99999 PR PBB SHADOW E&M-EST. PATIENT-LVL III: CPT | Mod: PBBFAC,,, | Performed by: PEDIATRICS

## 2020-09-17 PROCEDURE — 93010 ELECTROCARDIOGRAM REPORT: CPT | Mod: S$GLB,,, | Performed by: INTERNAL MEDICINE

## 2020-09-17 PROCEDURE — 3079F PR MOST RECENT DIASTOLIC BLOOD PRESSURE 80-89 MM HG: ICD-10-PCS | Mod: CPTII,S$GLB,, | Performed by: PEDIATRICS

## 2020-09-17 PROCEDURE — 93303 ECHO TRANSTHORACIC: CPT | Mod: 26,,, | Performed by: PEDIATRICS

## 2020-09-17 PROCEDURE — 93325 DOPPLER ECHO COLOR FLOW MAPG: CPT

## 2020-09-17 PROCEDURE — 3074F SYST BP LT 130 MM HG: CPT | Mod: CPTII,S$GLB,, | Performed by: PEDIATRICS

## 2020-09-17 PROCEDURE — 93320 ECHO (CUPID ONLY): ICD-10-PCS | Mod: 26,,, | Performed by: PEDIATRICS

## 2020-09-17 NOTE — PROGRESS NOTES
2020    re:Livia May  :1995    [facility]    Brigida Arellano MD  [address] [facility] [address]     Dear Dr. Arellano:        Patient ID:  Livia May is a 25 y.o. male who presents for follow-up of complex congenital heart disease.  To summarize, his diagnoses are as follows:  1.  TGA/VSD s/p arterial switch [facility]   2.  Coarctation stent ( -Claudio).   - looks great on echo with no significant gradient  3.  Residual small LV to RA shunt (1.4:1 shunt on cath , very small shunt noted on  cardiac catheterization)  4.  Normal coronaries on cath ,   5.  mild aortic insufficiency and aortic root enlargement  6.  Very mild branch pulmonary artery stenosis, difficult to image on echo  7.  Likely PFO (cath ).  Positive right to left bubble study.  Unclear if shunting at atrial or ventricular level.  No migraines or TIA symptoms.  8.  Mildly abnormal appearing tricuspid valve without significant tricuspid insufficiency or stenosis  9.  Syncope, 2018, likely vasovagal in origin (while finger nail clipping).  Extensive workup negative.  Now status post implantable loop recorder 2018.   - plan to remove the loop next month (Av)    My recommendations are as follows:  1.  I would recommend endocarditis prophylaxis before dental work due to the residual ventricular septal defect.  2.  Follow up as scheduled for removal of his loop recorder next month.  3.  Report any syncope immediately.   4.  Continue increased fluid intake.  Sit down immediately if he becomes lightheaded.  Otherwise, there is no need for activity restriction or endocarditis prophylaxis.  5.  Any migraines or TIA like symptoms would prompt consideration of a repeat cardiac catheterization with transesophageal echocardiogram and closure of his patent foramen ovale given the right to left bubble study.  A repeat bubble study  would be obtained in the lab to make sure there was no ventricular level shunting although this is obviously extremely unlikely.  6.  Otherwise follow-up with me in 1 year with echo, EKG, Holter.  7.  Continue current medications.    Discussion:  He looks great clinically.  My recommendations are as noted above.    History of present illness:  He has done very well since I last saw him in clinic a year ago.  No chest pain, palpitations, syncope, near syncope, cyanosis, or edema.  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  No problems doing his regular activities.    The review of systems is as noted above. It is otherwise negative for other symptoms related to the general, neurological, psychiatric, endocrine, gastrointestinal, genitourinary, respiratory, dermatologic, musculoskeletal, hematologic, and immunologic systems.    Past Medical History:   Diagnosis Date    Coarctation of aorta, congenital     Diaphragmatic paralysis     Strabismus     Syncope and collapse 06/2018    TGA/VSD (transposition of great arteries, ventricular septal defect)      Past Surgical History:   Procedure Laterality Date    ABLATION N/A 6/12/2018    Procedure: ABLATION;  Surgeon: Octaviano Ley MD;  Location: [facility] CATH LAB;  Service: Cardiology;  Laterality: N/A;  syncope/SVT, EPS, +/-RFA, +/-ICD, +/-PPM, +/-ILR, Anes AO, DM, 353A (peds congenital)    ARTERIAL SWITCH W/ VENTRICULAR SEPTAL DEFECT CLOSURE      CARDIAC CATHETERIZATION  10/11/07    CARDIAC CATHETERIZATION  1/17/12    CARDIAC ELECTROPHYSIOLOGY STUDY N/A 6/12/2018    Procedure: STUDY-EP;  Surgeon: Octaviano Ley MD;  Location: [facility] CATH LAB;  Service: Cardiology;  Laterality: N/A;    coarctation of aorta repair/with TGA switch      DIAPHRAGM PLICATION      LOOP RECORDER IMPLANT  06/12/2018    STRABISMUS SURGERY       Family History   Problem Relation Age of Onset    Asthma Brother     Congenital heart disease Neg Hx     Early death Neg Hx      Social  History     Socioeconomic History    Marital status: Single     Spouse name: Not on file    Number of children: Not on file    Years of education: Not on file    Highest education level: Not on file   Occupational History    Not on file   Social Needs    Financial resource strain: Not on file    Food insecurity     Worry: Not on file     Inability: Not on file    Transportation needs     Medical: Not on file     Non-medical: Not on file   Tobacco Use    Smoking status: Never Smoker    Smokeless tobacco: Never Used   Substance and Sexual Activity    Alcohol use: No    Drug use: No    Sexual activity: Not on file   Lifestyle    Physical activity     Days per week: Not on file     Minutes per session: Not on file    Stress: Not on file   Relationships    Social connections     Talks on phone: Not on file     Gets together: Not on file     Attends Religion service: Not on file     Active member of club or organization: Not on file     Attends meetings of clubs or organizations: Not on file     Relationship status: Not on file   Other Topics Concern    Not on file   Social History Narrative    Just graduated from [employer] with a degree in general studies.  Looking for a job.    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  Current Outpatient Medications on File Prior to Visit   Medication Sig Dispense Refill    lisinopril (PRINIVIL,ZESTRIL) 5 MG tablet TAKE 1 TABLET BY MOUTH ONCE A DAY **MAY CAUSE DIZZINESS OR DROWSINESS** 30 tablet 12    [DISCONTINUED] lisinopril (PRINIVIL,ZESTRIL) 5 MG tablet TAKE 1 TABLET BY MOUTH DAILY **May Cause Dizziness** (Patient not taking: Reported on 9/17/2020) 30 tablet 12    [DISCONTINUED] metoprolol tartrate (LOPRESSOR) 25 MG tablet TAKE 1/2 TABLET BY MOUTH TWICE DAILY FOR 14 DAYS  0     No current facility-administered medications on file prior to visit.      Review of patient's allergies indicates:  No Known Allergies    BP (!) 148/80 (BP  "Location: Left leg, Patient Position: Lying, BP Method: Large (Automatic))   Pulse 83   Ht 5' 9" (1.753 m)   Wt 60 kg (132 lb 4.4 oz)   SpO2 100%   BMI 19.53 kg/m² RL BP 93/52       Objective:    Physical Exam   Constitutional: He is oriented to person, place, and time. He appears well-developed and well-nourished. No distress.   HENT:   Head: Normocephalic and atraumatic.   Right Ear: External ear normal.   Left Ear: External ear normal.   Nose: Nose normal.   Mouth/Throat: Oropharynx is clear and moist. No oropharyngeal exudate.   Eyes: Pupils are equal, round, and reactive to light. Conjunctivae and EOM are normal. Right eye exhibits no discharge. Left eye exhibits no discharge. No scleral icterus.   Neck: Normal range of motion. Neck supple. No JVD present. No tracheal deviation present. No thyromegaly present.   Cardiovascular: Normal rate, regular rhythm, S1 normal, S2 normal and intact distal pulses. Exam reveals no gallop and no friction rub.   Murmur heard.  High-pitched blowing early systolic murmur is present with a grade of 1/6 at the lower left sternal border.  Pulses:       Carotid pulses are 2+ on the right side and 2+ on the left side.       Radial pulses are 2+ on the right side and 2+ on the left side.        Femoral pulses are 2+ on the right side and 2+ on the left side.       Dorsalis pedis pulses are 2+ on the right side and 2+ on the left side.   2/6 harsh, squeaky murmur at LLSB and apex   Pulmonary/Chest: Effort normal and breath sounds normal. No stridor. No respiratory distress. He has no wheezes. He has no rales. He exhibits no tenderness.   well healed sternotomy.  Loop recorder site healing well.   Abdominal: Soft. Bowel sounds are normal. He exhibits no distension and no mass. There is no abdominal tenderness. There is no rebound and no guarding.   Musculoskeletal: Normal range of motion.         General: No tenderness, deformity or edema.   Lymphadenopathy:     He has no cervical " adenopathy.   Neurological: He is alert and oriented to person, place, and time. No cranial nerve deficit. He exhibits normal muscle tone. Coordination normal.   Skin: Skin is warm and dry. He is not diaphoretic.   Psychiatric: He has a normal mood and affect. His behavior is normal. Judgment and thought content normal.         An EKG performed in clinic today reveals normal sinus rhythm with a right bundle branch block.  It is unchanged.    Echocardiogram today:  Imaging consistent with diagnosis of d-TGA s/p arterial switch & coarctation s/p stent:   There is prolapse of the tricuspid valve leaflets into the right atrium with reasonable coaptation and mild tricuspid regurgitation arising at several distinct jets.   There is a trivial jet to the RA along the atrial septum with velocity suggesting small LV to RA shunt.   Right ventricle appears mildly dilated with qualitatively good function.   Right ventricular pressure estimated >35 mmHg above right atrial pressure from reasonably defined TR doppler profile.  Color Doppler suggests at least a mild degree of pulmonary insufficiency.   Main and pulmonary branches are not well demonstrated in this study with the anatomy confounded by Rhonda maneuver for arterial switch.   Redundant anterior mitral leaflet with prominent prolapse and several views and no significant insufficiency demonstrated.  Left ventricle appears normal in size and structure.   Paradoxical septal motion with good movement of the left ventricular free wall,  SF=30 % and EF estimated  55 -60% from apical four chamber views   Normal indices of left ventricular diastolic function.  Images suggest but not diagnostic for trileaflet aortic (neoaortic) valve.  Trivial to mild aortic regurgitation.   Aortic dimensions:  Sinuses of Valsalva = 35 mm.  ST junction             = 25 mm.  Ascending aorta     = 25 mm.  Echodensity consistent with stent in descending aorta at site of coarctation with peak  velocity <2.3 m/s and no diastolic runoff.     Cardiac MRI performed October 22, 2019:  Conclusion: D-TGA with arterial switch. History of Coarcatation stent. Dilated aortic root  1. Increased RV diastolic volume with RVEF of 44%  2. There is mild PI with peak velocity through the valve of 1.8 m/sec   3. There is no stenosis of the right or left PA. There is equal flow through both PA.  4. Increased LV volumes with LVEF of 46%  5. Aortic valve appears tricuspid with trivial to mild AI  6. The sinus of Valsalva mildly dilated measures 39 to 40 mm depending on the method.    7. Ascending aorta measures 25 mm  8. Good visualization of the stent in the descending aorta without significant stenosis (both on angio and candy cane black blood images)    CTA June 2018:  1.  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  No stenosis identified in the reimplanted coronary arteries.  2.  Possible thickening of the anterior leaflet of the mitral valve; please correlate with echocardiography.  3.  I suspect perimembranous VSD patch.  4.  Measurements are provided for the aorta and pulmonary arteries.  Stent in the proximal descending aorta represents treatment for coarctation.  5.  Arch and isthmus are incompletely included on this study diverted to assessment for coronary artery anatomy and caliber.    EP study 6/12/18:  1. Not inducible for sustained VT or VF with up to 3 VES, from 2 different RV sites, in the baseline state or with isuprel infusion.  2. Normal AH, HV.  3. Normal CSNRT.  4. Normal response to procainamide challenge.  5. Not inducible for sustained SVT with up to double AES.  6. Successful placement, and later removal, of a right femoral arterial sheath for BP monitoring.    I reviewed the cardiac catheterization report from January 12, 2012:  1.  A small residual left ventricle to right atrial shunt was noted.  2.  The coarctation was stented  with a 26 x 10 Tito LD stent  3.  There was about a 14-16 mmHg gradient from the right ventricle into the branch pulmonary arteries    A cardiac catheterization in 2007 revealed normal coronary arteries.     Thank you for referring this patient to our clinic.  Please call with any questions.    Sincerely,        Patrick Godfrey MD  Pediatric Cardiology  Adult Congenital Heart Disease  Pediatric Heart Failure and Transplantation  [facility] [address] [place]  [address]  [phone]

## 2020-09-23 ENCOUNTER — CLINICAL SUPPORT (OUTPATIENT)
Dept: CARDIOLOGY | Facility: HOSPITAL | Age: 25
End: 2020-09-23
Payer: COMMERCIAL

## 2020-09-23 DIAGNOSIS — Z95.818 PRESENCE OF OTHER CARDIAC IMPLANTS AND GRAFTS: ICD-10-CM

## 2020-09-23 PROCEDURE — 93298 CARDIAC DEVICE CHECK - REMOTE: ICD-10-PCS | Mod: ,,, | Performed by: INTERNAL MEDICINE

## 2020-09-23 PROCEDURE — G2066 INTER DEVC REMOTE 30D: HCPCS | Performed by: INTERNAL MEDICINE

## 2020-09-23 PROCEDURE — 93298 REM INTERROG DEV EVAL SCRMS: CPT | Mod: ,,, | Performed by: INTERNAL MEDICINE

## 2020-10-16 ENCOUNTER — TELEPHONE (OUTPATIENT)
Dept: ELECTROPHYSIOLOGY | Facility: CLINIC | Age: 25
End: 2020-10-16

## 2020-10-17 ENCOUNTER — LAB VISIT (OUTPATIENT)
Dept: OTOLARYNGOLOGY | Facility: CLINIC | Age: 25
End: 2020-10-17
Payer: COMMERCIAL

## 2020-10-17 ENCOUNTER — LAB VISIT (OUTPATIENT)
Dept: LAB | Facility: HOSPITAL | Age: 25
End: 2020-10-17
Attending: INTERNAL MEDICINE
Payer: COMMERCIAL

## 2020-10-17 DIAGNOSIS — Z01.818 PRE-OP EXAM: ICD-10-CM

## 2020-10-17 DIAGNOSIS — Z98.890 HISTORY OF LOOP RECORDER: ICD-10-CM

## 2020-10-17 DIAGNOSIS — Z95.818 PRESENCE OF OTHER CARDIAC IMPLANTS AND GRAFTS: ICD-10-CM

## 2020-10-17 LAB
ANION GAP SERPL CALC-SCNC: 9 MMOL/L (ref 8–16)
APTT BLDCRRT: 30.3 SEC (ref 21–32)
BASOPHILS # BLD AUTO: 0.05 K/UL (ref 0–0.2)
BASOPHILS NFR BLD: 0.6 % (ref 0–1.9)
BUN SERPL-MCNC: 9 MG/DL (ref 6–20)
CALCIUM SERPL-MCNC: 9.4 MG/DL (ref 8.7–10.5)
CHLORIDE SERPL-SCNC: 103 MMOL/L (ref 95–110)
CO2 SERPL-SCNC: 28 MMOL/L (ref 23–29)
CREAT SERPL-MCNC: 0.9 MG/DL (ref 0.5–1.4)
DIFFERENTIAL METHOD: ABNORMAL
EOSINOPHIL # BLD AUTO: 0.1 K/UL (ref 0–0.5)
EOSINOPHIL NFR BLD: 1.4 % (ref 0–8)
ERYTHROCYTE [DISTWIDTH] IN BLOOD BY AUTOMATED COUNT: 13 % (ref 11.5–14.5)
EST. GFR  (AFRICAN AMERICAN): >60 ML/MIN/1.73 M^2
EST. GFR  (NON AFRICAN AMERICAN): >60 ML/MIN/1.73 M^2
GLUCOSE SERPL-MCNC: 77 MG/DL (ref 70–110)
HCT VFR BLD AUTO: 50.9 % (ref 40–54)
HGB BLD-MCNC: 16.5 G/DL (ref 14–18)
IMM GRANULOCYTES # BLD AUTO: 0.04 K/UL (ref 0–0.04)
IMM GRANULOCYTES NFR BLD AUTO: 0.5 % (ref 0–0.5)
INR PPP: 1.1 (ref 0.8–1.2)
LYMPHOCYTES # BLD AUTO: 2.5 K/UL (ref 1–4.8)
LYMPHOCYTES NFR BLD: 30.8 % (ref 18–48)
MCH RBC QN AUTO: 31.5 PG (ref 27–31)
MCHC RBC AUTO-ENTMCNC: 32.4 G/DL (ref 32–36)
MCV RBC AUTO: 97 FL (ref 82–98)
MONOCYTES # BLD AUTO: 0.7 K/UL (ref 0.3–1)
MONOCYTES NFR BLD: 8.2 % (ref 4–15)
NEUTROPHILS # BLD AUTO: 4.7 K/UL (ref 1.8–7.7)
NEUTROPHILS NFR BLD: 58.5 % (ref 38–73)
NRBC BLD-RTO: 0 /100 WBC
PLATELET # BLD AUTO: 230 K/UL (ref 150–350)
PMV BLD AUTO: 9.5 FL (ref 9.2–12.9)
POTASSIUM SERPL-SCNC: 3.9 MMOL/L (ref 3.5–5.1)
PROTHROMBIN TIME: 11.8 SEC (ref 9–12.5)
RBC # BLD AUTO: 5.24 M/UL (ref 4.6–6.2)
SODIUM SERPL-SCNC: 140 MMOL/L (ref 136–145)
WBC # BLD AUTO: 8.01 K/UL (ref 3.9–12.7)

## 2020-10-17 PROCEDURE — 85610 PROTHROMBIN TIME: CPT

## 2020-10-17 PROCEDURE — 85730 THROMBOPLASTIN TIME PARTIAL: CPT

## 2020-10-17 PROCEDURE — 80048 BASIC METABOLIC PNL TOTAL CA: CPT

## 2020-10-17 PROCEDURE — U0003 INFECTIOUS AGENT DETECTION BY NUCLEIC ACID (DNA OR RNA); SEVERE ACUTE RESPIRATORY SYNDROME CORONAVIRUS 2 (SARS-COV-2) (CORONAVIRUS DISEASE [COVID-19]), AMPLIFIED PROBE TECHNIQUE, MAKING USE OF HIGH THROUGHPUT TECHNOLOGIES AS DESCRIBED BY CMS-2020-01-R: HCPCS

## 2020-10-17 PROCEDURE — 85025 COMPLETE CBC W/AUTO DIFF WBC: CPT

## 2020-10-17 PROCEDURE — 36415 COLL VENOUS BLD VENIPUNCTURE: CPT

## 2020-10-18 LAB — SARS-COV-2 RNA RESP QL NAA+PROBE: NOT DETECTED

## 2020-10-19 ENCOUNTER — HOSPITAL ENCOUNTER (OUTPATIENT)
Facility: HOSPITAL | Age: 25
Discharge: HOME | End: 2020-10-19
Attending: INTERNAL MEDICINE | Admitting: INTERNAL MEDICINE
Payer: COMMERCIAL

## 2020-10-19 VITALS
SYSTOLIC BLOOD PRESSURE: 135 MMHG | TEMPERATURE: 97 F | OXYGEN SATURATION: 96 % | HEART RATE: 94 BPM | DIASTOLIC BLOOD PRESSURE: 63 MMHG | RESPIRATION RATE: 17 BRPM

## 2020-10-19 DIAGNOSIS — Z45.09 ENCOUNTER FOR LOOP RECORDER AT END OF BATTERY LIFE: ICD-10-CM

## 2020-10-19 DIAGNOSIS — Z95.9 CARDIAC DEVICE IN SITU: ICD-10-CM

## 2020-10-19 PROCEDURE — 25000003 PHARM REV CODE 250: Performed by: INTERNAL MEDICINE

## 2020-10-19 PROCEDURE — 99220 PR INITIAL OBSERVATION CARE,LEVL III: CPT | Mod: ,,, | Performed by: INTERNAL MEDICINE

## 2020-10-19 PROCEDURE — 63600175 PHARM REV CODE 636 W HCPCS: Performed by: INTERNAL MEDICINE

## 2020-10-19 PROCEDURE — 33286 PR REMOVAL, SUBQ CARDIAC RHYTHM MONITOR: ICD-10-PCS | Mod: ,,, | Performed by: INTERNAL MEDICINE

## 2020-10-19 PROCEDURE — 33286 RMVL SUBQ CAR RHYTHM MNTR: CPT | Performed by: INTERNAL MEDICINE

## 2020-10-19 PROCEDURE — 99220 PR INITIAL OBSERVATION CARE,LEVL III: ICD-10-PCS | Mod: ,,, | Performed by: INTERNAL MEDICINE

## 2020-10-19 PROCEDURE — 33286 RMVL SUBQ CAR RHYTHM MNTR: CPT | Mod: ,,, | Performed by: INTERNAL MEDICINE

## 2020-10-19 RX ORDER — CEFAZOLIN SODIUM 1 G/3ML
INJECTION, POWDER, FOR SOLUTION INTRAMUSCULAR; INTRAVENOUS
Status: DISCONTINUED | OUTPATIENT
Start: 2020-10-19 | End: 2020-10-19 | Stop reason: HOSPADM

## 2020-10-19 RX ORDER — VANCOMYCIN HYDROCHLORIDE 1 G/20ML
INJECTION, POWDER, LYOPHILIZED, FOR SOLUTION INTRAVENOUS
Status: DISCONTINUED | OUTPATIENT
Start: 2020-10-19 | End: 2020-10-19 | Stop reason: HOSPADM

## 2020-10-19 RX ORDER — CEFAZOLIN SODIUM 1 G/3ML
2 INJECTION, POWDER, FOR SOLUTION INTRAMUSCULAR; INTRAVENOUS
Status: DISCONTINUED | OUTPATIENT
Start: 2020-10-19 | End: 2020-10-19 | Stop reason: HOSPADM

## 2020-10-19 RX ORDER — LIDOCAINE HYDROCHLORIDE AND EPINEPHRINE 20; 10 MG/ML; UG/ML
INJECTION, SOLUTION INFILTRATION; PERINEURAL
Status: DISCONTINUED | OUTPATIENT
Start: 2020-10-19 | End: 2020-10-19 | Stop reason: HOSPADM

## 2020-10-19 NOTE — NURSING
IV d/c'd with cath tip intact.  Pt and pt mother verbalized understanding of d/c instructions.  Incision to chest wall covered with dermabond intact.  Incision to chest covered with mepilex dressing.  Ambulated off unit for discharge home with mother at pt side.

## 2020-10-19 NOTE — ASSESSMENT & PLAN NOTE
- Discussed the risks and benefits of ILR removal. Our discussion of risks included, but was not limited to the possibility of pain, infection, bleeding, and scar.  Discussed the indications, benefits, and alternatives of the planned procedure. All questions were answered. Mr. May understands and wishes to proceed. No further questioned voiced at this time   - Proceed with removal of ILR, device is JOESPH.     - Sedation and local anesthesia per nursing.

## 2020-10-19 NOTE — SUBJECTIVE & OBJECTIVE
Past Medical History:   Diagnosis Date    Coarctation of aorta, congenital     Diaphragmatic paralysis     Strabismus     Syncope and collapse 06/2018    TGA/VSD (transposition of great arteries, ventricular septal defect)        Past Surgical History:   Procedure Laterality Date    ABLATION N/A 6/12/2018    Procedure: ABLATION;  Surgeon: Octaviano Ley MD;  Location: [facility] CATH LAB;  Service: Cardiology;  Laterality: N/A;  syncope/SVT, EPS, +/-RFA, +/-ICD, +/-PPM, +/-ILR, Anes AO, DM, 353A (peds congenital)    ARTERIAL SWITCH W/ VENTRICULAR SEPTAL DEFECT CLOSURE      CARDIAC CATHETERIZATION  10/11/07    CARDIAC CATHETERIZATION  1/17/12    CARDIAC ELECTROPHYSIOLOGY STUDY N/A 6/12/2018    Procedure: STUDY-EP;  Surgeon: Octaviano Ley MD;  Location: [facility] CATH LAB;  Service: Cardiology;  Laterality: N/A;    coarctation of aorta repair/with TGA switch      DIAPHRAGM PLICATION      LOOP RECORDER IMPLANT  06/12/2018    STRABISMUS SURGERY         Review of patient's allergies indicates:  No Known Allergies    No current facility-administered medications on file prior to encounter.      Current Outpatient Medications on File Prior to Encounter   Medication Sig    lisinopril (PRINIVIL,ZESTRIL) 5 MG tablet TAKE 1 TABLET BY MOUTH ONCE A DAY **MAY CAUSE DIZZINESS OR DROWSINESS**     Family History     Problem Relation (Age of Onset)    Asthma Brother        Tobacco Use    Smoking status: Never Smoker    Smokeless tobacco: Never Used   Substance and Sexual Activity    Alcohol use: No    Drug use: No    Sexual activity: Not on file     Review of Systems   Constitution: Negative for fever and malaise/fatigue.   Cardiovascular: Negative for chest pain, dyspnea on exertion, irregular heartbeat, palpitations and syncope.   Hematologic/Lymphatic: Negative for bleeding problem.   Neurological: Negative for dizziness, light-headedness and loss of balance.   Psychiatric/Behavioral: Negative for altered mental  status. The patient is not nervous/anxious.      Objective:     Vital Signs (Most Recent):    Vital Signs (24h Range):  BP: ()/()   Arterial Line BP: ()/()           There is no height or weight on file to calculate BMI.            Physical Exam   Constitutional: He is oriented to person, place, and time. Vital signs are normal. He appears well-developed and well-nourished.   Cardiovascular: Normal rate, regular rhythm, S1 normal, S2 normal, normal heart sounds and intact distal pulses.   Pulses:       Radial pulses are 2+ on the right side and 2+ on the left side.   Pulmonary/Chest: Effort normal and breath sounds normal.   Musculoskeletal: Normal range of motion.         General: No edema.   Neurological: He is alert and oriented to person, place, and time. He has normal strength.   Skin: Skin is warm, dry and intact.   Psychiatric: He has a normal mood and affect. His speech is normal and behavior is normal. Judgment and thought content normal. Cognition and memory are normal.   Vitals reviewed.

## 2020-10-19 NOTE — HOSPITAL COURSE
Mr. May underwent ILR removal.  He tolerated the procedure well and had no acute complications.  Left chest site with mepilex dressing. Plan of care and discharge instructions discussed.  Mr. May was discharged home in stable condition.

## 2020-10-19 NOTE — ASSESSMENT & PLAN NOTE
s/p ILR removal     Plan:  - Continue PTA medications   - Routine follow up with Dr. Kemal Godfrey  - Discharge plans/instructions discussed with patient who verbalized understanding  and agreement of plans of care. No further questions or concerns voiced at this time.

## 2020-10-19 NOTE — H&P
[facility] - Short Stay Cardiac Unit  Cardiac Electrophysiology  History and Physical     Admission Date: 10/19/2020  Code Status: Prior   Attending Provider: Octaviano Ley MD   Principal Problem:<principal problem not specified>    Subjective:     Chief Complaint:  ILR EOS     HPI:  Livia May presents to short stay cardiac unit on 10/19/2020 for planned ILR removal with Dr. Ley.     Mr. May is a 25 y.o. male with TGA and VSD, coarctation of aorta, and syncope s/p ILR.     He had an episode of syncope in 2018, thought to be vasovagal.  Underwent extensive work-up, including an EPS in 06/2018 that was unrevealing.  At that time, out of an abundance of caution, an ILR was implanted for long term monitoring.     Since implant Mr. May has done well.  He has not had any recurrent episodes of syncope.  On device interrogation there was one episode of sinus tachycardia with rates 125-133 bpm.  This occurred with an emotional/stressful occurrence.  Future device interrogations have been unrevealing of arrhythmia.  Most recent interrogation on 08/16/20 indicated ILR EOS.     Today, Mr. May reports feeling well. He denies chest pain, overt shortness of breath, palpitations, syncope, or any other acute symptoms.       Past Medical History:   Diagnosis Date    Coarctation of aorta, congenital     Diaphragmatic paralysis     Strabismus     Syncope and collapse 06/2018    TGA/VSD (transposition of great arteries, ventricular septal defect)        Past Surgical History:   Procedure Laterality Date    ABLATION N/A 6/12/2018    Procedure: ABLATION;  Surgeon: Octaviano Ley MD;  Location: [facility] CATH LAB;  Service: Cardiology;  Laterality: N/A;  syncope/SVT, EPS, +/-RFA, +/-ICD, +/-PPM, +/-ILR, Anes AO, DM, 353A (peds congenital)    ARTERIAL SWITCH W/ VENTRICULAR SEPTAL DEFECT CLOSURE      CARDIAC CATHETERIZATION  10/11/07    CARDIAC CATHETERIZATION  1/17/12    CARDIAC ELECTROPHYSIOLOGY STUDY N/A  6/12/2018    Procedure: STUDY-EP;  Surgeon: Octaviano Ley MD;  Location: [facility] CATH LAB;  Service: Cardiology;  Laterality: N/A;    coarctation of aorta repair/with TGA switch      DIAPHRAGM PLICATION      LOOP RECORDER IMPLANT  06/12/2018    STRABISMUS SURGERY         Review of patient's allergies indicates:  No Known Allergies    No current facility-administered medications on file prior to encounter.      Current Outpatient Medications on File Prior to Encounter   Medication Sig    lisinopril (PRINIVIL,ZESTRIL) 5 MG tablet TAKE 1 TABLET BY MOUTH ONCE A DAY **MAY CAUSE DIZZINESS OR DROWSINESS**     Family History     Problem Relation (Age of Onset)    Asthma Brother        Tobacco Use    Smoking status: Never Smoker    Smokeless tobacco: Never Used   Substance and Sexual Activity    Alcohol use: No    Drug use: No    Sexual activity: Not on file     Review of Systems   Constitution: Negative for fever and malaise/fatigue.   Cardiovascular: Negative for chest pain, dyspnea on exertion, irregular heartbeat, palpitations and syncope.   Hematologic/Lymphatic: Negative for bleeding problem.   Neurological: Negative for dizziness, light-headedness and loss of balance.   Psychiatric/Behavioral: Negative for altered mental status. The patient is not nervous/anxious.      Objective:     Vital Signs (Most Recent):    Vital Signs (24h Range):  BP: ()/()   Arterial Line BP: ()/()           There is no height or weight on file to calculate BMI.            Physical Exam   Constitutional: He is oriented to person, place, and time. Vital signs are normal. He appears well-developed and well-nourished.   Cardiovascular: Normal rate, regular rhythm, S1 normal, S2 normal, normal heart sounds and intact distal pulses.   Pulses:       Radial pulses are 2+ on the right side and 2+ on the left side.   Pulmonary/Chest: Effort normal and breath sounds normal.   Musculoskeletal: Normal range of motion.         General: No edema.    Neurological: He is alert and oriented to person, place, and time. He has normal strength.   Skin: Skin is warm, dry and intact.   Psychiatric: He has a normal mood and affect. His speech is normal and behavior is normal. Judgment and thought content normal. Cognition and memory are normal.   Vitals reviewed.        Assessment and Plan:     Encounter for loop recorder at end of battery life  - Discussed the risks and benefits of ILR removal. Our discussion of risks included, but was not limited to the possibility of pain, infection, bleeding, and scar.  Discussed the indications, benefits, and alternatives of the planned procedure. All questions were answered. Mr. May understands and wishes to proceed. No further questioned voiced at this time   - Proceed with removal of ILR, device is JOESPH.     - Sedation and local anesthesia per nursing.       Nelly Dove NP  Cardiac Electrophysiology  [facility] Stay Cardiac [facility]

## 2020-10-19 NOTE — HPI
Livia May presents to short stay cardiac unit on 10/19/2020 for planned ILR removal with Dr. Ley.     Mr. May is a 25 y.o. male with TGA and VSD, coarctation of aorta, and syncope s/p ILR.     He had an episode of syncope in 2018, thought to be vasovagal.  Underwent extensive work-up, including an EPS in 06/2018 that was unrevealing.  At that time, out of an abundance of caution, an ILR was implanted for long term monitoring.     Since implant Mr. May has done well.  He has not had any recurrent episodes of syncope.  On device interrogation there was one episode of sinus tachycardia with rates 125-133 bpm.  This occurred with an emotional/stressful occurrence.  Future device interrogations have been unrevealing of arrhythmia.  Most recent interrogation on 08/16/20 indicated ILR EOS.     Today, Mr. May reports feeling well. He denies chest pain, overt shortness of breath, palpitations, syncope, or any other acute symptoms.

## 2020-10-19 NOTE — DISCHARGE SUMMARY
[facility]  Cardiac Electrophysiology  Discharge Summary      Patient Name: Livia May  MRN: [id number]  Admission Date: 10/19/2020  Hospital Length of Stay: 0 days  Discharge Date and Time:  10/19/2020 4:01 PM  Attending Physician: Octaviano Ley MD    Discharging Provider: Nelly Dove NP  Primary Care Physician: Brigida Arellano MD    HPI:   Livia May presents to short stay cardiac unit on 10/19/2020 for planned ILR removal with Dr. Ley.     Mr. May is a 25 y.o. male with TGA and VSD, coarctation of aorta, and syncope s/p ILR.     He had an episode of syncope in 2018, thought to be vasovagal.  Underwent extensive work-up, including an EPS in 06/2018 that was unrevealing.  At that time, out of an abundance of caution, an ILR was implanted for long term monitoring.     Since implant Mr. May has done well.  He has not had any recurrent episodes of syncope.  On device interrogation there was one episode of sinus tachycardia with rates 125-133 bpm.  This occurred with an emotional/stressful occurrence.  Future device interrogations have been unrevealing of arrhythmia.  Most recent interrogation on 08/16/20 indicated ILR EOS.     Today, Mr. May reports feeling well. He denies chest pain, overt shortness of breath, palpitations, syncope, or any other acute symptoms.         Procedure(s) (LRB):  REMOVAL, IMPLANTABLE LOOP RECORDER (N/A)     Indwelling Lines/Drains at time of discharge:  Lines/Drains/Airways     None             Hospital Course:  Mr. May underwent ILR removal.  He tolerated the procedure well and had no acute complications.  Left chest site with mepilex dressing. Plan of care and discharge instructions discussed.  Mr. May was discharged home in stable condition.     Consults: N/A    Pending Diagnostic Studies:     None          Final Active Diagnoses:    Diagnosis Date Noted POA    Encounter for loop recorder at end of battery life [Z45.09] 10/19/2020 Not Applicable     "  Problems Resolved During this Admission:     Encounter for loop recorder at end of battery life  s/p ILR removal     Plan:  - Continue PTA medications   - Routine follow up with Dr. Kemal Godfrey  - Discharge plans/instructions discussed with patient who verbalized understanding  and agreement of plans of care. No further questions or concerns voiced at this time.     Discharged Condition: good    Disposition: Home or Self Care    Follow Up:    Patient Instructions:      Diet Adult Regular     Other restrictions (specify):   Order Comments: Restrictions  You are discharged with a standard dressing over the incision, you may remove the dressing after 24 hours.     There is Dermabond (clear glue) over your incision, do not scrub it off. It acts as a barrier and will eventually disappear.    Do not get the incision wet for 48 hours following the procedure. After 48 hours, you may shower, but you should still try to keep this area as dry as possible, and avoid direct water contact to the incision (allow the water to hit back of your shoulder rather than directly on the incision). Gently pat the incision dry if it does get wet.    Do not submerge the incision in a tub, pool, or body of water for 6 weeks.    Activity   If you were driving prior to the procedure, you may resume driving right after your procedure (as long you did not receive any sedation).   Avoid "rough contact" at the device site for 6 weeks.    Long-Term Follow-Up  Your device has the ability to transmit device information from home to the doctor's office using a home monitoring system.  This remote system takes the place of a doctor's visit. Your device will be checked from home every 31 days. Every 12 months, you will be asked to come into the office for an in-office check.  Your device should last in the range of 3 years.         Any need to reschedule or cancel procedures, or any questions regarding your procedures should be addressed directly with the " Arrhythmia Department Nurses at the following phone number: [phone].           Notify your health care provider if you experience any of the following:  temperature >100.4     Notify your health care provider if you experience any of the following:  severe uncontrolled pain     Notify your health care provider if you experience any of the following:  redness, tenderness, or signs of infection (pain, swelling, redness, odor or green/yellow discharge around incision site)     Notify your health care provider if you experience any of the following:  difficulty breathing or increased cough     Notify your health care provider if you experience any of the following:  persistent dizziness, light-headedness, or visual disturbances     Activity as tolerated     Medications:  Reconciled Home Medications:      Medication List      CONTINUE taking these medications    lisinopriL 5 MG tablet  Commonly known as: PRINIVIL,ZESTRIL  TAKE 1 TABLET BY MOUTH ONCE A DAY **MAY CAUSE DIZZINESS OR DROWSINESS**            Time spent on the discharge of patient: 20 minutes    Nelly Dove NP  Cardiac Electrophysiology  [facility]

## 2021-05-13 DIAGNOSIS — Z87.74 S/P COARCTATION STENT: ICD-10-CM

## 2021-05-13 DIAGNOSIS — Z98.890 PERSONAL HISTORY OF SURGERY TO HEART AND GREAT VESSELS, PRESENTING HAZARDS TO HEALTH: ICD-10-CM

## 2021-05-13 DIAGNOSIS — Q21.0 VSD (VENTRICULAR SEPTAL DEFECT): Primary | ICD-10-CM

## 2021-05-13 DIAGNOSIS — Z95.828 S/P COARCTATION STENT: ICD-10-CM

## 2021-05-13 DIAGNOSIS — Q24.9 ADULT CONGENITAL HEART DISEASE: ICD-10-CM

## 2021-05-13 DIAGNOSIS — Q25.1 AORTIC COARCTATION: ICD-10-CM

## 2021-06-24 ENCOUNTER — HOSPITAL ENCOUNTER (OUTPATIENT)
Dept: CARDIOLOGY | Facility: HOSPITAL | Age: 26
Discharge: HOME | End: 2021-06-24
Attending: INTERNAL MEDICINE
Payer: COMMERCIAL

## 2021-06-24 ENCOUNTER — HOSPITAL ENCOUNTER (OUTPATIENT)
Dept: CARDIOLOGY | Facility: CLINIC | Age: 26
Discharge: HOME | End: 2021-06-24
Payer: COMMERCIAL

## 2021-06-24 ENCOUNTER — OFFICE VISIT (OUTPATIENT)
Dept: CARDIOLOGY | Facility: CLINIC | Age: 26
End: 2021-06-24
Payer: COMMERCIAL

## 2021-06-24 VITALS
OXYGEN SATURATION: 98 % | SYSTOLIC BLOOD PRESSURE: 156 MMHG | WEIGHT: 132.69 LBS | WEIGHT: 132 LBS | HEART RATE: 81 BPM | HEIGHT: 69 IN | HEIGHT: 69 IN | BODY MASS INDEX: 19.65 KG/M2 | BODY MASS INDEX: 19.55 KG/M2 | DIASTOLIC BLOOD PRESSURE: 75 MMHG

## 2021-06-24 DIAGNOSIS — Q21.0 VSD (VENTRICULAR SEPTAL DEFECT): ICD-10-CM

## 2021-06-24 DIAGNOSIS — R55 VASOVAGAL SYNCOPE: ICD-10-CM

## 2021-06-24 DIAGNOSIS — Z95.828 S/P COARCTATION STENT: ICD-10-CM

## 2021-06-24 DIAGNOSIS — Q25.1 AORTIC COARCTATION: ICD-10-CM

## 2021-06-24 DIAGNOSIS — Z98.890 PERSONAL HISTORY OF SURGERY TO HEART AND GREAT VESSELS, PRESENTING HAZARDS TO HEALTH: ICD-10-CM

## 2021-06-24 DIAGNOSIS — Q20.3 D-TRANSPOSITION OF THE GREAT ARTERIES: Primary | ICD-10-CM

## 2021-06-24 DIAGNOSIS — Q24.9 ADULT CONGENITAL HEART DISEASE: ICD-10-CM

## 2021-06-24 DIAGNOSIS — Z87.74 S/P COARCTATION STENT: ICD-10-CM

## 2021-06-24 LAB
ASCENDING AORTA: 3.33 CM
AV INDEX (PROSTH): 0.98
AV MEAN GRADIENT: 2 MMHG
AV PEAK GRADIENT: 3 MMHG
AV VALVE AREA: 5.04 CM2
AV VELOCITY RATIO: 0.92
BSA FOR ECHO PROCEDURE: 1.71 M2
CV ECHO LV RWT: 0.34 CM
DOP CALC AO PEAK VEL: 0.91 M/S
DOP CALC AO VTI: 16.99 CM
DOP CALC LVOT AREA: 5.1 CM2
DOP CALC LVOT DIAMETER: 2.56 CM
DOP CALC LVOT PEAK VEL: 0.84 M/S
DOP CALC LVOT STROKE VOLUME: 85.71 CM3
DOP CALC RVOT PEAK VEL: 0.87 M/S
DOP CALC RVOT VTI: 20.31 CM
DOP CALCLVOT PEAK VEL VTI: 16.66 CM
E WAVE DECELERATION TIME: 159.86 MSEC
E/A RATIO: 1.73
E/E' RATIO: 7.39 M/S
ECHO LV POSTERIOR WALL: 0.8 CM (ref 0.6–1.1)
EJECTION FRACTION: 55 %
FRACTIONAL SHORTENING: 29 % (ref 28–44)
INTERVENTRICULAR SEPTUM: 0.67 CM (ref 0.6–1.1)
LA MAJOR: 4.09 CM
LA MINOR: 3.52 CM
LA WIDTH: 2.63 CM
LEFT ATRIUM SIZE: 2.65 CM
LEFT ATRIUM VOLUME INDEX: 13 ML/M2
LEFT ATRIUM VOLUME: 22.41 CM3
LEFT INTERNAL DIMENSION IN SYSTOLE: 3.41 CM (ref 2.1–4)
LEFT VENTRICLE DIASTOLIC VOLUME INDEX: 61.38 ML/M2
LEFT VENTRICLE DIASTOLIC VOLUME: 106.19 ML
LEFT VENTRICLE MASS INDEX: 65 G/M2
LEFT VENTRICLE SYSTOLIC VOLUME INDEX: 27.6 ML/M2
LEFT VENTRICLE SYSTOLIC VOLUME: 47.67 ML
LEFT VENTRICULAR INTERNAL DIMENSION IN DIASTOLE: 4.77 CM (ref 3.5–6)
LEFT VENTRICULAR MASS: 112.45 G
LV LATERAL E/E' RATIO: 5.67 M/S
LV SEPTAL E/E' RATIO: 10.63 M/S
MV PEAK A VEL: 0.49 M/S
MV PEAK E VEL: 0.85 M/S
MV STENOSIS PRESSURE HALF TIME: 46.36 MS
MV VALVE AREA P 1/2 METHOD: 4.75 CM2
PISA TR MAX VEL: 2.7 M/S
PV MEAN GRADIENT: 2 MMHG
PV PEAK VELOCITY: 1.68 CM/S
RA MAJOR: 3.93 CM
RA WIDTH: 3.71 CM
RIGHT VENTRICULAR END-DIASTOLIC DIMENSION: 3.91 CM
RV TISSUE DOPPLER FREE WALL SYSTOLIC VELOCITY 1 (APICAL 4 CHAMBER VIEW): 8.24 CM/S
SINUS: 3.79 CM
STJ: 3.17 CM
TDI LATERAL: 0.15 M/S
TDI SEPTAL: 0.08 M/S
TDI: 0.12 M/S
TR MAX PG: 29 MMHG
TRICUSPID ANNULAR PLANE SYSTOLIC EXCURSION: 1.96 CM

## 2021-06-24 PROCEDURE — 93320 ECHO (CUPID ONLY): ICD-10-PCS | Mod: 26,,, | Performed by: PEDIATRICS

## 2021-06-24 PROCEDURE — 93303 ECHO TRANSTHORACIC: CPT | Mod: 26,,, | Performed by: PEDIATRICS

## 2021-06-24 PROCEDURE — 93325 DOPPLER ECHO COLOR FLOW MAPG: CPT | Mod: 26,,, | Performed by: PEDIATRICS

## 2021-06-24 PROCEDURE — 93010 EKG 12-LEAD: ICD-10-PCS | Mod: S$GLB,,, | Performed by: INTERNAL MEDICINE

## 2021-06-24 PROCEDURE — 93005 EKG 12-LEAD: ICD-10-PCS | Mod: S$GLB,,, | Performed by: PEDIATRICS

## 2021-06-24 PROCEDURE — 99214 PR OFFICE/OUTPT VISIT, EST, LEVL IV, 30-39 MIN: ICD-10-PCS | Mod: S$GLB,,, | Performed by: PEDIATRICS

## 2021-06-24 PROCEDURE — 99999 PR PBB SHADOW E&M-EST. PATIENT-LVL III: ICD-10-PCS | Mod: PBBFAC,,, | Performed by: PEDIATRICS

## 2021-06-24 PROCEDURE — 99214 OFFICE O/P EST MOD 30 MIN: CPT | Mod: S$GLB,,, | Performed by: PEDIATRICS

## 2021-06-24 PROCEDURE — 99999 PR PBB SHADOW E&M-EST. PATIENT-LVL III: CPT | Mod: PBBFAC,,, | Performed by: PEDIATRICS

## 2021-06-24 PROCEDURE — 93005 ELECTROCARDIOGRAM TRACING: CPT | Mod: S$GLB,,, | Performed by: PEDIATRICS

## 2021-06-24 PROCEDURE — 93325 ECHO (CUPID ONLY): ICD-10-PCS | Mod: 26,,, | Performed by: PEDIATRICS

## 2021-06-24 PROCEDURE — 93320 DOPPLER ECHO COMPLETE: CPT

## 2021-06-24 PROCEDURE — 93303 ECHO (CUPID ONLY): ICD-10-PCS | Mod: 26,,, | Performed by: PEDIATRICS

## 2021-06-24 PROCEDURE — 93010 ELECTROCARDIOGRAM REPORT: CPT | Mod: S$GLB,,, | Performed by: INTERNAL MEDICINE

## 2021-06-24 PROCEDURE — 93320 DOPPLER ECHO COMPLETE: CPT | Mod: 26,,, | Performed by: PEDIATRICS

## 2021-06-29 ENCOUNTER — PATIENT MESSAGE (OUTPATIENT)
Dept: CARDIOLOGY | Facility: CLINIC | Age: 26
End: 2021-06-29

## 2022-04-12 ENCOUNTER — TELEPHONE (OUTPATIENT)
Dept: PEDIATRIC CARDIOLOGY | Facility: CLINIC | Age: 27
End: 2022-04-12
Payer: COMMERCIAL

## 2022-04-12 DIAGNOSIS — Q24.9 ADULT CONGENITAL HEART DISEASE: ICD-10-CM

## 2022-04-12 DIAGNOSIS — Z95.828 S/P COARCTATION STENT: ICD-10-CM

## 2022-04-12 DIAGNOSIS — Z98.890 PERSONAL HISTORY OF SURGERY TO HEART AND GREAT VESSELS, PRESENTING HAZARDS TO HEALTH: ICD-10-CM

## 2022-04-12 DIAGNOSIS — Q25.1 AORTIC COARCTATION: ICD-10-CM

## 2022-04-12 DIAGNOSIS — Z87.74 S/P COARCTATION STENT: ICD-10-CM

## 2022-04-12 DIAGNOSIS — Q21.0 VSD (VENTRICULAR SEPTAL DEFECT): Primary | ICD-10-CM

## 2022-04-12 NOTE — TELEPHONE ENCOUNTER
Appt scheduled for June 9 start time 10 AM, mom verbalized understanding all information provided   ----- Message from Dana Varghese sent at 4/12/2022  2:14 PM CDT -----  Contact: Mom @[phone]  Patient would like to get medical advice.  Yearly  appt     Would you like a call back, or a response through your [facility] portal?:  call back       Comments:     Mom is requesting a call back to advise on an appt for the pt.

## 2022-06-09 ENCOUNTER — OFFICE VISIT (OUTPATIENT)
Dept: CARDIOLOGY | Facility: CLINIC | Age: 27
End: 2022-06-09
Payer: COMMERCIAL

## 2022-06-09 ENCOUNTER — HOSPITAL ENCOUNTER (OUTPATIENT)
Dept: CARDIOLOGY | Facility: HOSPITAL | Age: 27
Discharge: HOME | End: 2022-06-09
Attending: PEDIATRICS
Payer: COMMERCIAL

## 2022-06-09 ENCOUNTER — HOSPITAL ENCOUNTER (OUTPATIENT)
Dept: CARDIOLOGY | Facility: CLINIC | Age: 27
Discharge: HOME | End: 2022-06-09
Payer: COMMERCIAL

## 2022-06-09 VITALS
SYSTOLIC BLOOD PRESSURE: 146 MMHG | WEIGHT: 142.19 LBS | BODY MASS INDEX: 21.06 KG/M2 | OXYGEN SATURATION: 97 % | HEIGHT: 69 IN | BODY MASS INDEX: 19.55 KG/M2 | HEART RATE: 83 BPM | WEIGHT: 132 LBS | DIASTOLIC BLOOD PRESSURE: 88 MMHG | HEIGHT: 69 IN

## 2022-06-09 DIAGNOSIS — Z98.890 PERSONAL HISTORY OF SURGERY TO HEART AND GREAT VESSELS, PRESENTING HAZARDS TO HEALTH: ICD-10-CM

## 2022-06-09 DIAGNOSIS — Q21.0 VSD (VENTRICULAR SEPTAL DEFECT): ICD-10-CM

## 2022-06-09 DIAGNOSIS — Z87.74 S/P COARCTATION STENT: ICD-10-CM

## 2022-06-09 DIAGNOSIS — Q24.9 ADULT CONGENITAL HEART DISEASE: ICD-10-CM

## 2022-06-09 DIAGNOSIS — Q21.0 VSD (VENTRICULAR SEPTAL DEFECT): Primary | ICD-10-CM

## 2022-06-09 DIAGNOSIS — Z95.828 S/P COARCTATION STENT: ICD-10-CM

## 2022-06-09 DIAGNOSIS — Q25.1 AORTIC COARCTATION: ICD-10-CM

## 2022-06-09 DIAGNOSIS — R55 VASOVAGAL SYNCOPE: ICD-10-CM

## 2022-06-09 DIAGNOSIS — I10 ESSENTIAL HYPERTENSION: ICD-10-CM

## 2022-06-09 LAB
ASCENDING AORTA: 2.59 CM
AV INDEX (PROSTH): 0.92
AV MEAN GRADIENT: 2 MMHG
AV PEAK GRADIENT: 3 MMHG
AV VALVE AREA: 3.34 CM2
AV VELOCITY RATIO: 0.94
BSA FOR ECHO PROCEDURE: 1.71 M2
CV ECHO LV RWT: 0.35 CM
DOP CALC AO PEAK VEL: 0.81 M/S
DOP CALC AO VTI: 16.53 CM
DOP CALC LVOT AREA: 3.6 CM2
DOP CALC LVOT DIAMETER: 2.15 CM
DOP CALC LVOT PEAK VEL: 0.76 M/S
DOP CALC LVOT STROKE VOLUME: 55.19 CM3
DOP CALCLVOT PEAK VEL VTI: 15.21 CM
E WAVE DECELERATION TIME: 166.61 MSEC
E/A RATIO: 2.32
E/E' RATIO: 7.9 M/S
ECHO LV POSTERIOR WALL: 0.8 CM (ref 0.6–1.1)
EJECTION FRACTION: 50 %
FRACTIONAL SHORTENING: 27 % (ref 28–44)
INTERVENTRICULAR SEPTUM: 0.8 CM (ref 0.6–1.1)
LA MAJOR: 4.05 CM
LA MINOR: 3.41 CM
LA WIDTH: 2.5 CM
LEFT ATRIUM SIZE: 2.51 CM
LEFT ATRIUM VOLUME INDEX MOD: 8.5 ML/M2
LEFT ATRIUM VOLUME INDEX: 11.4 ML/M2
LEFT ATRIUM VOLUME MOD: 14.74 CM3
LEFT ATRIUM VOLUME: 19.75 CM3
LEFT INTERNAL DIMENSION IN SYSTOLE: 3.34 CM (ref 2.1–4)
LEFT VENTRICLE DIASTOLIC VOLUME INDEX: 55.97 ML/M2
LEFT VENTRICLE DIASTOLIC VOLUME: 96.82 ML
LEFT VENTRICLE MASS INDEX: 68 G/M2
LEFT VENTRICLE SYSTOLIC VOLUME INDEX: 26.2 ML/M2
LEFT VENTRICLE SYSTOLIC VOLUME: 45.39 ML
LEFT VENTRICULAR INTERNAL DIMENSION IN DIASTOLE: 4.59 CM (ref 3.5–6)
LEFT VENTRICULAR MASS: 117.47 G
LV LATERAL E/E' RATIO: 5.64 M/S
LV SEPTAL E/E' RATIO: 13.17 M/S
MV PEAK A VEL: 0.34 M/S
MV PEAK E VEL: 0.79 M/S
MV STENOSIS PRESSURE HALF TIME: 48.32 MS
MV VALVE AREA P 1/2 METHOD: 4.55 CM2
PISA TR MAX VEL: 2.67 M/S
RA MAJOR: 4.11 CM
RA WIDTH: 3.22 CM
RIGHT VENTRICULAR END-DIASTOLIC DIMENSION: 4.34 CM
RV TISSUE DOPPLER FREE WALL SYSTOLIC VELOCITY 1 (APICAL 4 CHAMBER VIEW): 6.19 CM/S
SINUS: 2.79 CM
STJ: 2.4 CM
TDI LATERAL: 0.14 M/S
TDI SEPTAL: 0.06 M/S
TDI: 0.1 M/S
TR MAX PG: 29 MMHG
TRICUSPID ANNULAR PLANE SYSTOLIC EXCURSION: 1.5 CM

## 2022-06-09 PROCEDURE — 3077F PR MOST RECENT SYSTOLIC BLOOD PRESSURE >= 140 MM HG: ICD-10-PCS | Mod: CPTII,S$GLB,, | Performed by: PEDIATRICS

## 2022-06-09 PROCEDURE — 99214 OFFICE O/P EST MOD 30 MIN: CPT | Mod: 25,S$GLB,, | Performed by: PEDIATRICS

## 2022-06-09 PROCEDURE — 93325 ECHO (CUPID ONLY): ICD-10-PCS | Mod: 26,,, | Performed by: PEDIATRICS

## 2022-06-09 PROCEDURE — 1159F MED LIST DOCD IN RCRD: CPT | Mod: CPTII,S$GLB,, | Performed by: PEDIATRICS

## 2022-06-09 PROCEDURE — 1159F PR MEDICATION LIST DOCUMENTED IN MEDICAL RECORD: ICD-10-PCS | Mod: CPTII,S$GLB,, | Performed by: PEDIATRICS

## 2022-06-09 PROCEDURE — 3079F PR MOST RECENT DIASTOLIC BLOOD PRESSURE 80-89 MM HG: ICD-10-PCS | Mod: CPTII,S$GLB,, | Performed by: PEDIATRICS

## 2022-06-09 PROCEDURE — 93320 ECHO (CUPID ONLY): ICD-10-PCS | Mod: 26,,, | Performed by: PEDIATRICS

## 2022-06-09 PROCEDURE — 93005 EKG 12-LEAD: ICD-10-PCS | Mod: S$GLB,,, | Performed by: PEDIATRICS

## 2022-06-09 PROCEDURE — 93010 ELECTROCARDIOGRAM REPORT: CPT | Mod: S$GLB,,, | Performed by: INTERNAL MEDICINE

## 2022-06-09 PROCEDURE — 99214 PR OFFICE/OUTPT VISIT, EST, LEVL IV, 30-39 MIN: ICD-10-PCS | Mod: 25,S$GLB,, | Performed by: PEDIATRICS

## 2022-06-09 PROCEDURE — 93010 EKG 12-LEAD: ICD-10-PCS | Mod: S$GLB,,, | Performed by: INTERNAL MEDICINE

## 2022-06-09 PROCEDURE — 99999 PR PBB SHADOW E&M-EST. PATIENT-LVL III: CPT | Mod: PBBFAC,,, | Performed by: PEDIATRICS

## 2022-06-09 PROCEDURE — 93303 ECHO TRANSTHORACIC: CPT | Mod: 26,,, | Performed by: PEDIATRICS

## 2022-06-09 PROCEDURE — 4010F ACE/ARB THERAPY RXD/TAKEN: CPT | Mod: CPTII,S$GLB,, | Performed by: PEDIATRICS

## 2022-06-09 PROCEDURE — 4010F PR ACE/ARB THEARPY RXD/TAKEN: ICD-10-PCS | Mod: CPTII,S$GLB,, | Performed by: PEDIATRICS

## 2022-06-09 PROCEDURE — 93005 ELECTROCARDIOGRAM TRACING: CPT | Mod: S$GLB,,, | Performed by: PEDIATRICS

## 2022-06-09 PROCEDURE — 3008F BODY MASS INDEX DOCD: CPT | Mod: CPTII,S$GLB,, | Performed by: PEDIATRICS

## 2022-06-09 PROCEDURE — 93303 ECHO (CUPID ONLY): ICD-10-PCS | Mod: 26,,, | Performed by: PEDIATRICS

## 2022-06-09 PROCEDURE — 93325 DOPPLER ECHO COLOR FLOW MAPG: CPT | Mod: 26,,, | Performed by: PEDIATRICS

## 2022-06-09 PROCEDURE — 3079F DIAST BP 80-89 MM HG: CPT | Mod: CPTII,S$GLB,, | Performed by: PEDIATRICS

## 2022-06-09 PROCEDURE — 93320 DOPPLER ECHO COMPLETE: CPT | Mod: 26,,, | Performed by: PEDIATRICS

## 2022-06-09 PROCEDURE — 3077F SYST BP >= 140 MM HG: CPT | Mod: CPTII,S$GLB,, | Performed by: PEDIATRICS

## 2022-06-09 PROCEDURE — 99999 PR PBB SHADOW E&M-EST. PATIENT-LVL III: ICD-10-PCS | Mod: PBBFAC,,, | Performed by: PEDIATRICS

## 2022-06-09 PROCEDURE — 93303 ECHO TRANSTHORACIC: CPT

## 2022-06-09 PROCEDURE — 3008F PR BODY MASS INDEX (BMI) DOCUMENTED: ICD-10-PCS | Mod: CPTII,S$GLB,, | Performed by: PEDIATRICS

## 2022-06-09 NOTE — PROGRESS NOTES
2022    re:Livia May  :1995    [facility]    Brigida Arellano MD  [address] [facility] [address]     Dear Dr. Arellano:        Patient ID:  Livia May is a 26 y.o. male who presents for follow-up of complex congenital heart disease.  To summarize, his diagnoses are as follows:  1.  TGA/VSD s/p arterial switch procedure and VSD closure (Dr. LongoriaBanner)   2.  Coarctation stent ( -Claudio).   - looks great on echo with no significant gradient  3.  Residual small LV to RA shunt (very small shunt noted on  cardiac catheterization)  4.  Normal coronaries on cath , CTA   5.  mild aortic insufficiency and aortic root enlargement  6.  Very mild branch pulmonary artery stenosis, difficult to image on echo  7.  Likely PFO (cath ).  Positive right to left bubble study.  Unclear if shunting at atrial or ventricular level.  No migraines or TIA symptoms.  8.  Mildly abnormal appearing tricuspid valve without significant tricuspid insufficiency or stenosis  9.  Syncope, 2018, likely vasovagal in origin (while fingernail clipping).  Extensive workup negative.  Wore a loop recorder.    My recommendations are as follows:  1.  I would recommend endocarditis prophylaxis before dental work due to the residual ventricular septal defect.  2.  Continue current medications.  3.  Report any syncope immediately.   4.  Continue increased fluid intake.  Sit down immediately if he becomes lightheaded.  Otherwise, there is no need for activity restriction or endocarditis prophylaxis.  5.  Any migraines or TIA like symptoms would prompt consideration of a repeat cardiac catheterization with transesophageal echocardiogram and closure of his patent foramen ovale given the right to left bubble study.  A repeat bubble study would be obtained in the lab to make sure there was no ventricular level shunting although this is obviously extremely unlikely.  6.  Otherwise  follow-up with me in 1 year with echo, EKG, Holter.    Discussion:  He looks great clinically.  My recommendations are as noted above.  I would like to see him again in a year.  However, if he is living elsewhere, he will let me know and I will find an Adult Congenital Heart Disease program.    History of present illness:  He has done very well since I last saw him in clinic a year ago.  No chest pain, palpitations, syncope, near syncope, cyanosis, or edema.  No problems doing his regular activities.  He just spent a year in [place] and will spend the next year in [place] working for the [employer].  He may end up becoming a .    The review of systems is as noted above. It is otherwise negative for other symptoms related to the general, neurological, psychiatric, endocrine, gastrointestinal, genitourinary, respiratory, dermatologic, musculoskeletal, hematologic, and immunologic systems.    Past Medical History:   Diagnosis Date    Coarctation of aorta, congenital     Diaphragmatic paralysis     Strabismus     Syncope and collapse 06/2018    TGA/VSD (transposition of great arteries, ventricular septal defect)      Past Surgical History:   Procedure Laterality Date    ABLATION N/A 6/12/2018    Procedure: ABLATION;  Surgeon: Octaviano Ley MD;  Location: [facility] CATH LAB;  Service: Cardiology;  Laterality: N/A;  syncope/SVT, EPS, +/-RFA, +/-ICD, +/-PPM, +/-ILR, Anes AO, DM, 353A (peds congenital)    ARTERIAL SWITCH W/ VENTRICULAR SEPTAL DEFECT CLOSURE      CARDIAC CATHETERIZATION  10/11/07    CARDIAC CATHETERIZATION  1/17/12    CARDIAC ELECTROPHYSIOLOGY STUDY N/A 6/12/2018    Procedure: STUDY-EP;  Surgeon: Octaviano Ley MD;  Location: [facility] CATH LAB;  Service: Cardiology;  Laterality: N/A;    coarctation of aorta repair/with TGA switch      DIAPHRAGM PLICATION      LOOP RECORDER IMPLANT  06/12/2018    REMOVAL OF IMPLANTABLE LOOP RECORDER N/A 10/19/2020    Procedure: REMOVAL, IMPLANTABLE  "LOOP RECORDER;  Surgeon: Octaviano Ley MD;  Location: [facility] EP LAB;  Service: Cardiology;  Laterality: N/A;    STRABISMUS SURGERY       Family History   Problem Relation Age of Onset    Asthma Brother     Congenital heart disease Neg Hx     Early death Neg Hx      Social History     Socioeconomic History    Marital status: Single   Tobacco Use    Smoking status: Never Smoker    Smokeless tobacco: Never Used   Substance and Sexual Activity    Alcohol use: No    Drug use: No   Social History Narrative    Just graduated from [employer] with a degree in general studies.  Looking for a job.     Current Outpatient Medications on File Prior to Visit   Medication Sig Dispense Refill    lisinopriL (PRINIVIL,ZESTRIL) 5 MG tablet TAKE 1 TABLET BY MOUTH ONCE A DAY **MAY CAUSE DIZZINESS OR DROWSINESS** 90 tablet 4     No current facility-administered medications on file prior to visit.     Review of patient's allergies indicates:  No Known Allergies    BP (!) 146/88 (BP Location: Left leg, Patient Position: Lying, BP Method: Large (Automatic))   Pulse 83   Ht 5' 9" (1.753 m)   Wt 64.5 kg (142 lb 3.2 oz)   SpO2 97%   BMI 21.00 kg/m² RL BP 93/52  Vitals:    06/09/22 1132 06/09/22 1134   BP: 124/62 (!) 146/88   BP Location: Right arm Left leg   Patient Position: Sitting Lying   BP Method: Large (Automatic) Large (Automatic)   Pulse: 88 83   SpO2: 97%    Weight: 64.5 kg (142 lb 3.2 oz)    Height: 5' 9" (1.753 m)           Objective:    Physical Exam  Constitutional:       General: He is not in acute distress.     Appearance: He is well-developed. He is not diaphoretic.   HENT:      Head: Normocephalic and atraumatic.      Right Ear: External ear normal.      Left Ear: External ear normal.      Nose: Nose normal.      Mouth/Throat:      Pharynx: No oropharyngeal exudate.   Eyes:      General: No scleral icterus.        Right eye: No discharge.         Left eye: No discharge.      Conjunctiva/sclera: Conjunctivae " normal.      Pupils: Pupils are equal, round, and reactive to light.   Neck:      Thyroid: No thyromegaly.      Vascular: No JVD.      Trachea: No tracheal deviation.   Cardiovascular:      Rate and Rhythm: Normal rate and regular rhythm.      Pulses: Intact distal pulses.           Carotid pulses are 2+ on the right side and 2+ on the left side.       Radial pulses are 2+ on the right side and 2+ on the left side.        Femoral pulses are 2+ on the right side and 2+ on the left side.       Dorsalis pedis pulses are 2+ on the right side and 2+ on the left side.      Heart sounds: S1 normal and S2 normal. No murmur heard.  High-pitched blowing early systolic murmur is present at the lower left sternal border.    No friction rub. No gallop.      Comments: 2/6 harsh, squeaky murmur at LLSB and apex  Pulmonary:      Effort: Pulmonary effort is normal. No respiratory distress.      Breath sounds: Normal breath sounds. No stridor. No wheezing or rales.   Chest:      Chest wall: No tenderness.   Abdominal:      General: Bowel sounds are normal. There is no distension.      Palpations: Abdomen is soft. There is no mass.      Tenderness: There is no abdominal tenderness. There is no guarding or rebound.   Musculoskeletal:         General: No tenderness or deformity. Normal range of motion.      Cervical back: Normal range of motion and neck supple.   Lymphadenopathy:      Cervical: No cervical adenopathy.   Skin:     General: Skin is warm and dry.   Neurological:      Mental Status: He is alert and oriented to person, place, and time.      Cranial Nerves: No cranial nerve deficit.      Motor: No abnormal muscle tone.      Coordination: Coordination normal.   Psychiatric:         Behavior: Behavior normal.         Thought Content: Thought content normal.         Judgment: Judgment normal.           An EKG performed in clinic today reveals normal sinus rhythm with a right bundle branch block.  It is unchanged.    Results for  orders placed during the hospital encounter of 06/09/22    Echo    Interpretation Summary  CONGENITAL CARDIAC HISTORY:  d-TGA, VSD and coarctation.  S/P Arterial switch (Rhonda) and VSD closure.  S/P Coarctation stent in 2012.  Residual LV to RA shunt, PFO, dilated aortic root and AI.    SEGMENTAL CARDIAC CONNECTIONS:  Abdominal situs solitus.  Atrial situs solitus.  Atrioventricular alignment concordant.  Symmetric development of right and left ventricles.  Ventriculoarterial alignment concordant.  Aortic and pulmonary valves grossly normal.  Morphology is consistent with d-loop.  Cardiac position in the chest is Levocardia.  The atrial septum appears intact.  The ventricular septum appears intact.      IMPRESSION:  Imaging consistent with diagnosis of d-TGA s/p arterial switch & coarctation s/p stent:  Color doppler demonstrates small left to right shunt at PFO (Clips 91 -93).  There is prolapse of the tricuspid valve leaflets into the right atrium with reasonable coaptation and mild tricuspid regurgitation arising at several distinct jets.  There is a trivial jet to the RA along the atrial septum with velocity suggesting small LV to RA shunt.  Right ventricle appears mildly dilated with qualitatively good function.  Right ventricular pressure estimated >29 mmHg above right atrial pressure from reasonably defined TR doppler profile.  There is no residual VSD demonstrated.  Color Doppler suggests at least a mild degree of pulmonary insufficiency.  Main and pulmonary branches are not demonstrated in this study with the anatomy confounded by Rhonda maneuver for arterial switch.  Redundant anterior mitral leaflet with prominent prolapse in several views and no significant insufficiency demonstrated.  Left ventricle appears normal in size and structure.  Paradoxical septal motion with good movement of the left ventricular free wall,  SF=27 % and EF estimated  55 -60% from apical four chamber views  Normal indices  of left ventricular diastolic function.  Images suggest but not diagnostic for trileaflet aortic (neoaortic) valve.  Trivial to mild aortic regurgitation.  Aortic dimensions:  Sinuses of Valsalva = 39 mm.  ST junction  & Ascending aorta not well demonstrated.  Echodensity consistent with stent in descending aorta at site of coarctation with peak velocity <2.2 m/s and no diastolic runoff.  There is no pericardial effusion.    Cardiac MRI performed October 22, 2019:  Conclusion: D-TGA with arterial switch. History of Coarcatation stent. Dilated aortic root  1. Increased RV diastolic volume with RVEF of 44%  2. There is mild PI with peak velocity through the valve of 1.8 m/sec   3. There is no stenosis of the right or left PA. There is equal flow through both PA.  4. Increased LV volumes with LVEF of 46%  5. Aortic valve appears tricuspid with trivial to mild AI  6. The sinus of Valsalva mildly dilated measures 39 to 40 mm depending on the method.    7. Ascending aorta measures 25 mm  8. Good visualization of the stent in the descending aorta without significant stenosis (both on angio and candy cane black blood images)    CTA June 2018:  1.  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  No stenosis identified in the reimplanted coronary arteries.  2.  Possible thickening of the anterior leaflet of the mitral valve; please correlate with echocardiography.  3.  I suspect perimembranous VSD patch.  4.  Measurements are provided for the aorta and pulmonary arteries.  Stent in the proximal descending aorta represents treatment for coarctation.  5.  Arch and isthmus are incompletely included on this study diverted to assessment for coronary artery anatomy and caliber.    EP study 6/12/18:  1. Not inducible for sustained VT or VF with up to 3 VES, from 2 different RV sites, in the baseline state or with isuprel infusion.  2. Normal AH, HV.  3. Normal  CSNRT.  4. Normal response to procainamide challenge.  5. Not inducible for sustained SVT with up to double AES.  6. Successful placement, and later removal, of a right femoral arterial sheath for BP monitoring.    I reviewed the cardiac catheterization report from January 12, 2012:  1.  A small residual left ventricle to right atrial shunt was noted.  2.  The coarctation was stented with a 26 x 10 Tito LD stent  3.  There was about a 14-16 mmHg gradient from the right ventricle into the branch pulmonary arteries    A cardiac catheterization in 2007 revealed normal coronary arteries.     Thank you for referring this patient to our clinic.  Please call with any questions.    Sincerely,        Patrick Godfrey MD  Pediatric Cardiology  Adult Congenital Heart Disease  Pediatric Heart Failure and Transplantation  [facility] [address] [place]  [address]  [phone]

## 2022-08-08 RX ORDER — LISINOPRIL 5 MG/1
TABLET ORAL
Qty: 90 TABLET | Refills: 3 | Status: SHIPPED | OUTPATIENT
Start: 2022-08-08 | End: 2022-10-31

## 2022-12-13 RX ORDER — LISINOPRIL 5 MG/1
5 TABLET ORAL DAILY
Qty: 90 TABLET | Refills: 3 | Status: SHIPPED | OUTPATIENT
Start: 2022-12-13 | End: 2024-01-09 | Stop reason: SDUPTHER

## 2023-07-27 ENCOUNTER — OFFICE VISIT (OUTPATIENT)
Dept: CARDIOLOGY | Facility: CLINIC | Age: 28
End: 2023-07-27
Payer: COMMERCIAL

## 2023-07-27 ENCOUNTER — HOSPITAL ENCOUNTER (OUTPATIENT)
Dept: CARDIOLOGY | Facility: HOSPITAL | Age: 28
Discharge: HOME | End: 2023-07-27
Attending: PEDIATRICS
Payer: COMMERCIAL

## 2023-07-27 ENCOUNTER — HOSPITAL ENCOUNTER (OUTPATIENT)
Dept: CARDIOLOGY | Facility: CLINIC | Age: 28
Discharge: HOME | End: 2023-07-27
Payer: COMMERCIAL

## 2023-07-27 ENCOUNTER — PATIENT MESSAGE (OUTPATIENT)
Dept: CARDIOLOGY | Facility: CLINIC | Age: 28
End: 2023-07-27

## 2023-07-27 VITALS
HEIGHT: 69 IN | HEART RATE: 91 BPM | OXYGEN SATURATION: 96 % | DIASTOLIC BLOOD PRESSURE: 70 MMHG | WEIGHT: 151.88 LBS | SYSTOLIC BLOOD PRESSURE: 124 MMHG | WEIGHT: 142 LBS | BODY MASS INDEX: 21.03 KG/M2 | HEIGHT: 69 IN | BODY MASS INDEX: 22.49 KG/M2

## 2023-07-27 DIAGNOSIS — Z95.828 S/P COARCTATION STENT: ICD-10-CM

## 2023-07-27 DIAGNOSIS — Q21.0 VSD (VENTRICULAR SEPTAL DEFECT): ICD-10-CM

## 2023-07-27 DIAGNOSIS — Z87.74 S/P COARCTATION STENT: Primary | ICD-10-CM

## 2023-07-27 DIAGNOSIS — Z87.74 S/P COARCTATION STENT: ICD-10-CM

## 2023-07-27 DIAGNOSIS — Z95.828 S/P COARCTATION STENT: Primary | ICD-10-CM

## 2023-07-27 DIAGNOSIS — Q25.1 AORTIC COARCTATION: ICD-10-CM

## 2023-07-27 DIAGNOSIS — R55 VASOVAGAL SYNCOPE: ICD-10-CM

## 2023-07-27 DIAGNOSIS — Q24.9 ADULT CONGENITAL HEART DISEASE: ICD-10-CM

## 2023-07-27 DIAGNOSIS — Z98.890 PERSONAL HISTORY OF SURGERY TO HEART AND GREAT VESSELS, PRESENTING HAZARDS TO HEALTH: ICD-10-CM

## 2023-07-27 PROBLEM — I10 ESSENTIAL HYPERTENSION: Status: RESOLVED | Noted: 2018-06-09 | Resolved: 2023-07-27

## 2023-07-27 LAB
ASCENDING AORTA: 3.7 CM
AV INDEX (PROSTH): 0.88
AV MEAN GRADIENT: 2 MMHG
AV PEAK GRADIENT: 4 MMHG
AV VALVE AREA: 4.03 CM2
AV VELOCITY RATIO: 0.8
BSA FOR ECHO PROCEDURE: 1.77 M2
CV ECHO LV RWT: 0.3 CM
DOP CALC AO PEAK VEL: 0.95 M/S
DOP CALC AO VTI: 17.28 CM
DOP CALC LVOT AREA: 4.6 CM2
DOP CALC LVOT DIAMETER: 2.42 CM
DOP CALC LVOT PEAK VEL: 0.76 M/S
DOP CALC LVOT STROKE VOLUME: 69.69 CM3
DOP CALCLVOT PEAK VEL VTI: 15.16 CM
E WAVE DECELERATION TIME: 118.65 MSEC
E/A RATIO: 1.82
E/E' RATIO: 6.96 M/S
ECHO LV POSTERIOR WALL: 0.75 CM (ref 0.6–1.1)
EJECTION FRACTION: 55 %
FRACTIONAL SHORTENING: 31 % (ref 28–44)
INTERVENTRICULAR SEPTUM: 0.66 CM (ref 0.6–1.1)
LA MAJOR: 4.59 CM
LA MINOR: 3.98 CM
LA WIDTH: 2.95 CM
LEFT ATRIUM SIZE: 2.95 CM
LEFT ATRIUM VOLUME INDEX: 17.6 ML/M2
LEFT ATRIUM VOLUME: 31.54 CM3
LEFT INTERNAL DIMENSION IN SYSTOLE: 3.47 CM (ref 2.1–4)
LEFT VENTRICLE DIASTOLIC VOLUME INDEX: 66.44 ML/M2
LEFT VENTRICLE DIASTOLIC VOLUME: 118.92 ML
LEFT VENTRICLE MASS INDEX: 65 G/M2
LEFT VENTRICLE SYSTOLIC VOLUME INDEX: 27.9 ML/M2
LEFT VENTRICLE SYSTOLIC VOLUME: 49.95 ML
LEFT VENTRICULAR INTERNAL DIMENSION IN DIASTOLE: 5.01 CM (ref 3.5–6)
LEFT VENTRICULAR MASS: 116.13 G
LV LATERAL E/E' RATIO: 5 M/S
LV SEPTAL E/E' RATIO: 11.43 M/S
MV PEAK A VEL: 0.44 M/S
MV PEAK E VEL: 0.8 M/S
MV STENOSIS PRESSURE HALF TIME: 34.41 MS
MV VALVE AREA P 1/2 METHOD: 6.39 CM2
PISA TR MAX VEL: 2.35 M/S
PULM VEIN S/D RATIO: 1.05
PV PEAK D VEL: 0.65 M/S
PV PEAK S VEL: 0.68 M/S
RA MAJOR: 4.64 CM
RA WIDTH: 4.39 CM
RIGHT VENTRICULAR END-DIASTOLIC DIMENSION: 4.74 CM
SINUS: 4.12 CM
STJ: 3.29 CM
TDI LATERAL: 0.16 M/S
TDI SEPTAL: 0.07 M/S
TDI: 0.12 M/S
TR MAX PG: 22 MMHG

## 2023-07-27 PROCEDURE — 99999 PR PBB SHADOW E&M-EST. PATIENT-LVL III: CPT | Mod: PBBFAC,,, | Performed by: PEDIATRICS

## 2023-07-27 PROCEDURE — 99999 PR PBB SHADOW E&M-EST. PATIENT-LVL III: ICD-10-PCS | Mod: PBBFAC,,, | Performed by: PEDIATRICS

## 2023-07-27 PROCEDURE — 3074F SYST BP LT 130 MM HG: CPT | Mod: CPTII,S$GLB,, | Performed by: PEDIATRICS

## 2023-07-27 PROCEDURE — 93320 ECHO (CUPID ONLY): ICD-10-PCS | Mod: 26,,, | Performed by: PEDIATRICS

## 2023-07-27 PROCEDURE — 93303 ECHO (CUPID ONLY): ICD-10-PCS | Mod: 26,,, | Performed by: PEDIATRICS

## 2023-07-27 PROCEDURE — 3008F PR BODY MASS INDEX (BMI) DOCUMENTED: ICD-10-PCS | Mod: CPTII,S$GLB,, | Performed by: PEDIATRICS

## 2023-07-27 PROCEDURE — 99214 PR OFFICE/OUTPT VISIT, EST, LEVL IV, 30-39 MIN: ICD-10-PCS | Mod: 25,S$GLB,, | Performed by: PEDIATRICS

## 2023-07-27 PROCEDURE — 93010 EKG 12-LEAD: ICD-10-PCS | Mod: S$GLB,,, | Performed by: INTERNAL MEDICINE

## 2023-07-27 PROCEDURE — 93303 ECHO TRANSTHORACIC: CPT | Mod: 26,,, | Performed by: PEDIATRICS

## 2023-07-27 PROCEDURE — 93320 DOPPLER ECHO COMPLETE: CPT | Mod: 26,,, | Performed by: PEDIATRICS

## 2023-07-27 PROCEDURE — 3074F PR MOST RECENT SYSTOLIC BLOOD PRESSURE < 130 MM HG: ICD-10-PCS | Mod: CPTII,S$GLB,, | Performed by: PEDIATRICS

## 2023-07-27 PROCEDURE — 99214 OFFICE O/P EST MOD 30 MIN: CPT | Mod: 25,S$GLB,, | Performed by: PEDIATRICS

## 2023-07-27 PROCEDURE — 93005 ELECTROCARDIOGRAM TRACING: CPT | Mod: S$GLB,,, | Performed by: PEDIATRICS

## 2023-07-27 PROCEDURE — 3078F PR MOST RECENT DIASTOLIC BLOOD PRESSURE < 80 MM HG: ICD-10-PCS | Mod: CPTII,S$GLB,, | Performed by: PEDIATRICS

## 2023-07-27 PROCEDURE — 1159F PR MEDICATION LIST DOCUMENTED IN MEDICAL RECORD: ICD-10-PCS | Mod: CPTII,S$GLB,, | Performed by: PEDIATRICS

## 2023-07-27 PROCEDURE — 93325 DOPPLER ECHO COLOR FLOW MAPG: CPT | Mod: 26,,, | Performed by: PEDIATRICS

## 2023-07-27 PROCEDURE — 1159F MED LIST DOCD IN RCRD: CPT | Mod: CPTII,S$GLB,, | Performed by: PEDIATRICS

## 2023-07-27 PROCEDURE — 3008F BODY MASS INDEX DOCD: CPT | Mod: CPTII,S$GLB,, | Performed by: PEDIATRICS

## 2023-07-27 PROCEDURE — 3078F DIAST BP <80 MM HG: CPT | Mod: CPTII,S$GLB,, | Performed by: PEDIATRICS

## 2023-07-27 PROCEDURE — 93325 ECHO (CUPID ONLY): ICD-10-PCS | Mod: 26,,, | Performed by: PEDIATRICS

## 2023-07-27 PROCEDURE — 93005 EKG 12-LEAD: ICD-10-PCS | Mod: S$GLB,,, | Performed by: PEDIATRICS

## 2023-07-27 PROCEDURE — 93010 ELECTROCARDIOGRAM REPORT: CPT | Mod: S$GLB,,, | Performed by: INTERNAL MEDICINE

## 2023-07-27 PROCEDURE — 93325 DOPPLER ECHO COLOR FLOW MAPG: CPT

## 2023-07-27 NOTE — PROGRESS NOTES
2023    re:Livia May  :1995    [facility]    Brigida Arellano MD  [address] [facility] [address]     Dear Dr. Arellano:        Patient ID:  Livia May is a 27 y.o. male who presents for follow-up of complex congenital heart disease.  To summarize, his diagnoses are as follows:  1.  TGA/VSD s/[facility]   - Residual small LV to RA shunt (very small shunt noted on  cardiac catheterization)    - Normal coronaries on cath , CTA 2018  - mild aortic insufficiency and aortic root enlargement  - Very mild branch pulmonary artery stenosis, difficult to image on echo  2.  Coarctation stent ( -Claudio).   - very mild (7-13mmHg) arm/leg blood pressure gradient  3.  Likely PFO (cath ).  Positive right to left bubble study in the past.  Unclear if shunting at atrial or ventricular level.  No migraines or TIA symptoms.  4.  Mildly abnormal appearing tricuspid valve without significant tricuspid insufficiency or stenosis  5.  Syncope, 2018, likely vasovagal in origin (while fingernail clipping).  Extensive workup negative.  s/p loop recorder.    My recommendations are as follows:  1.  I would recommend endocarditis prophylaxis before dental work due to the residual ventricular septal defect.  2.  Continue current medications.  Check baseline labs today.  3.  Report any syncope immediately.   4.  Continue increased fluid intake.  Sit down immediately if he becomes lightheaded.  Otherwise, there is no need for activity restriction or endocarditis prophylaxis.  5.  Any migraines or TIA like symptoms would prompt consideration of a repeat cardiac catheterization with transesophageal echocardiogram and closure of his patent foramen ovale given the right to left bubble study.  A repeat bubble study would be obtained in the lab to make sure there was no ventricular level shunting although this is obviously  extremely unlikely.  6.  Otherwise follow-up with me in 1 year with cardiac MRI, ekg, holter.    Discussion:  He looks great clinically.  My recommendations are as noted above.  I would like to see him again in a year.  However, if he is living elsewhere, he will let me know and I will find an Adult Congenital Heart Disease program.    History of present illness:  He has done very well since I last saw him in clinic a year ago.  No chest pain, palpitations, syncope, near syncope, cyanosis, or edema.  No problems doing his regular activities.  He just spent a year in [place], and he is working towards the priesthood.  He will be going to [place] to work on his degree.    The review of systems is as noted above. It is otherwise negative for other symptoms related to the general, neurological, psychiatric, endocrine, gastrointestinal, genitourinary, respiratory, dermatologic, musculoskeletal, hematologic, and immunologic systems.    Past Medical History:   Diagnosis Date    Coarctation of aorta, congenital     Diaphragmatic paralysis     Strabismus     Syncope and collapse 06/2018    TGA/VSD (transposition of great arteries, ventricular septal defect)      Past Surgical History:   Procedure Laterality Date    ABLATION N/A 6/12/2018    Procedure: ABLATION;  Surgeon: Octaviano Ley MD;  Location: [facility] CATH LAB;  Service: Cardiology;  Laterality: N/A;  syncope/SVT, EPS, +/-RFA, +/-ICD, +/-PPM, +/-ILR, Anes AO, DM, 353A (peds congenital)    ARTERIAL SWITCH W/ VENTRICULAR SEPTAL DEFECT CLOSURE      CARDIAC CATHETERIZATION  10/11/07    CARDIAC CATHETERIZATION  1/17/12    CARDIAC ELECTROPHYSIOLOGY STUDY N/A 6/12/2018    Procedure: STUDY-EP;  Surgeon: Octaviano Ley MD;  Location: [facility] CATH LAB;  Service: Cardiology;  Laterality: N/A;    coarctation of aorta repair/with TGA switch      DIAPHRAGM PLICATION      LOOP RECORDER IMPLANT  06/12/2018    REMOVAL OF IMPLANTABLE LOOP RECORDER N/A 10/19/2020    Procedure:  "REMOVAL, IMPLANTABLE LOOP RECORDER;  Surgeon: Octaviano Ley MD;  Location: [facility] LAB;  Service: Cardiology;  Laterality: N/A;    STRABISMUS SURGERY       Family History   Problem Relation Age of Onset    Asthma Brother     Congenital heart disease Neg Hx     Early death Neg Hx      Social History     Socioeconomic History    Marital status: Single   Tobacco Use    Smoking status: Never    Smokeless tobacco: Never   Substance and Sexual Activity    Alcohol use: No    Drug use: No   Social History Narrative    Just graduated from [employer] with a degree in general studies.  Looking for a job.     Current Outpatient Medications on File Prior to Visit   Medication Sig Dispense Refill    lisinopriL (PRINIVIL,ZESTRIL) 5 MG tablet Take 1 tablet (5 mg total) by mouth once daily. 90 tablet 3     No current facility-administered medications on file prior to visit.     Review of patient's allergies indicates:  No Known Allergies    /70 (BP Location: Right arm, Patient Position: Sitting)   Pulse 91   Ht 5' 9.09" (1.755 m)   Wt 68.9 kg (151 lb 14.4 oz)   SpO2 96%   BMI 22.37 kg/m² RL BP 93/52  Vitals:    07/27/23 1129 07/27/23 1133 07/27/23 1134   BP: 130/65 (!) 117/57 124/70   BP Location: Left arm Left leg Right arm   Patient Position: Sitting Lying Sitting   Pulse: 91     SpO2: 96%     Weight: 68.9 kg (151 lb 14.4 oz)     Height: 5' 9.09" (1.755 m)            Objective:    Physical Exam  Constitutional:       General: He is not in acute distress.     Appearance: He is well-developed. He is not diaphoretic.   HENT:      Head: Normocephalic and atraumatic.      Right Ear: External ear normal.      Left Ear: External ear normal.      Nose: Nose normal.      Mouth/Throat:      Pharynx: No oropharyngeal exudate.   Eyes:      General: No scleral icterus.        Right eye: No discharge.         Left eye: No discharge.      Conjunctiva/sclera: Conjunctivae normal.      Pupils: Pupils are equal, round, and " reactive to light.   Neck:      Thyroid: No thyromegaly.      Vascular: No JVD.      Trachea: No tracheal deviation.   Cardiovascular:      Rate and Rhythm: Normal rate and regular rhythm.      Pulses: Intact distal pulses.           Carotid pulses are 2+ on the right side and 2+ on the left side.       Radial pulses are 2+ on the right side and 2+ on the left side.        Femoral pulses are 2+ on the right side and 2+ on the left side.       Dorsalis pedis pulses are 2+ on the right side and 2+ on the left side.      Heart sounds: S1 normal and S2 normal. No murmur heard.  High-pitched blowing early systolic murmur is present at the lower left sternal border.     No friction rub. No gallop.      Comments: 2/6 harsh, squeaky murmur at LLSB and apex  Pulmonary:      Effort: Pulmonary effort is normal. No respiratory distress.      Breath sounds: Normal breath sounds. No stridor. No wheezing or rales.   Chest:      Chest wall: No tenderness.   Abdominal:      General: Bowel sounds are normal. There is no distension.      Palpations: Abdomen is soft. There is no mass.      Tenderness: There is no abdominal tenderness. There is no guarding or rebound.   Musculoskeletal:         General: No tenderness or deformity. Normal range of motion.      Cervical back: Normal range of motion and neck supple.   Lymphadenopathy:      Cervical: No cervical adenopathy.   Skin:     General: Skin is warm and dry.   Neurological:      Mental Status: He is alert and oriented to person, place, and time.      Cranial Nerves: No cranial nerve deficit.      Motor: No abnormal muscle tone.      Coordination: Coordination normal.   Psychiatric:         Behavior: Behavior normal.         Thought Content: Thought content normal.         Judgment: Judgment normal.         An EKG performed in clinic today reveals normal sinus rhythm with a right bundle branch block.  It is unchanged.    Results for orders placed during the hospital encounter of  06/09/22    Echo    Interpretation Summary  CONGENITAL CARDIAC HISTORY:  d-TGA, VSD and coarctation.  S/P Arterial switch (Rhonda) and VSD closure.  S/P Coarctation stent in 2012.  Residual LV to RA shunt, PFO, dilated aortic root and AI.    SEGMENTAL CARDIAC CONNECTIONS:  Abdominal situs solitus.  Atrial situs solitus.  Atrioventricular alignment concordant.  Symmetric development of right and left ventricles.  Ventriculoarterial alignment concordant.  Aortic and pulmonary valves grossly normal.  Morphology is consistent with d-loop.  Cardiac position in the chest is Levocardia.  The atrial septum appears intact.  The ventricular septum appears intact.      IMPRESSION:  Imaging consistent with diagnosis of d-TGA s/p arterial switch & coarctation s/p stent:  There is prolapse of the tricuspid valve leaflets into the right atrium with reasonable coaptation and mild tricuspid regurgitation  There is a trivial jet to the RA along the atrial septum with velocity suggesting small LV to RA shunt.  Right ventricle appears mildly dilated with qualitatively good function.  Color Doppler suggests mild degree of pulmonary insufficiency.  Main and pulmonary branches are not demonstrated in this study with the anatomy confounded by Rhonda maneuver for arterial switch.  Left ventricle appears normal in size and structure.  Paradoxical septal motion with good movement of the left ventricular free wall, EF estimated  55 -60% from apical four chamber views  trileaflet aortic (neoaortic) valve.  mild aortic regurgitation.  Aortic dimensions:  Sinuses of Valsalva = 4.1 mm.  ST junction  & Ascending aorta not well demonstrated.  Echodensity consistent with stent in descending aorta at site of coarctation with flow acceleration to as high as about 3 m/s but no diastolic run off    Cardiac MRI performed October 22, 2019:  Conclusion: D-TGA with arterial switch. History of Coarcatation stent. Dilated aortic root  Increased RV  diastolic volume with RVEF of 44%  There is mild PI with peak velocity through the valve of 1.8 m/sec   There is no stenosis of the right or left PA. There is equal flow through both PA.  Increased LV volumes with LVEF of 46%  Aortic valve appears tricuspid with trivial to mild AI  The sinus of Valsalva mildly dilated measures 39 to 40 mm depending on the method.    Ascending aorta measures 25 mm  Good visualization of the stent in the descending aorta without significant stenosis (both on angio and candy cane black blood images)    CTA June 2018:  1.  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  No stenosis identified in the reimplanted coronary arteries.  2.  Possible thickening of the anterior leaflet of the mitral valve; please correlate with echocardiography.  3.  I suspect perimembranous VSD patch.  4.  Measurements are provided for the aorta and pulmonary arteries.  Stent in the proximal descending aorta represents treatment for coarctation.  5.  Arch and isthmus are incompletely included on this study diverted to assessment for coronary artery anatomy and caliber.    EP study 6/12/18:  1. Not inducible for sustained VT or VF with up to 3 VES, from 2 different RV sites, in the baseline state or with isuprel infusion.  2. Normal AH, HV.  3. Normal CSNRT.  4. Normal response to procainamide challenge.  5. Not inducible for sustained SVT with up to double AES.  6. Successful placement, and later removal, of a right femoral arterial sheath for BP monitoring.    I reviewed the cardiac catheterization report from January 12, 2012:  1.  A small residual left ventricle to right atrial shunt was noted.  2.  The coarctation was stented with a 26 x 10 Tito LD stent  3.  There was about a 14-16 mmHg gradient from the right ventricle into the branch pulmonary arteries    A cardiac catheterization in 2007 revealed normal coronary arteries.     Thank  you for referring this patient to our clinic.  Please call with any questions.    Sincerely,        Patrick Godfrey MD  Pediatric Cardiology  Adult Congenital Heart Disease  Pediatric Heart Failure and Transplantation  [facility] [address] [place]  [address]  [phone]

## 2024-01-09 ENCOUNTER — PATIENT MESSAGE (OUTPATIENT)
Dept: CARDIOLOGY | Facility: CLINIC | Age: 29
End: 2024-01-09
Payer: COMMERCIAL

## 2024-01-09 RX ORDER — LISINOPRIL 5 MG/1
5 TABLET ORAL DAILY
Qty: 90 TABLET | Refills: 3 | Status: SHIPPED | OUTPATIENT
Start: 2024-01-09 | End: 2024-01-30 | Stop reason: SDUPTHER

## 2024-01-11 ENCOUNTER — PATIENT MESSAGE (OUTPATIENT)
Dept: PEDIATRIC CARDIOLOGY | Facility: CLINIC | Age: 29
End: 2024-01-11
Payer: COMMERCIAL

## 2024-01-26 ENCOUNTER — PATIENT MESSAGE (OUTPATIENT)
Dept: PEDIATRIC CARDIOLOGY | Facility: CLINIC | Age: 29
End: 2024-01-26
Payer: COMMERCIAL

## 2024-01-30 DIAGNOSIS — Z95.828 S/P COARCTATION STENT: ICD-10-CM

## 2024-01-30 DIAGNOSIS — Z87.74 S/P COARCTATION STENT: ICD-10-CM

## 2024-01-30 DIAGNOSIS — Q25.1 AORTIC COARCTATION: ICD-10-CM

## 2024-01-30 DIAGNOSIS — Q24.9 ADULT CONGENITAL HEART DISEASE: ICD-10-CM

## 2024-01-30 DIAGNOSIS — Q21.0 VSD (VENTRICULAR SEPTAL DEFECT): Primary | ICD-10-CM

## 2024-01-30 RX ORDER — LISINOPRIL 5 MG/1
5 TABLET ORAL DAILY
Qty: 90 TABLET | Refills: 3 | Status: SHIPPED | OUTPATIENT
Start: 2024-01-30 | End: 2024-05-01 | Stop reason: SDUPTHER

## 2024-01-30 NOTE — TELEPHONE ENCOUNTER
Processing on the original order for this prescription was held pending further information regarding insurance.  Patient had a change of insurance and has updated his information for our records.  Please re-order.

## 2024-02-15 ENCOUNTER — PATIENT MESSAGE (OUTPATIENT)
Dept: PEDIATRIC CARDIOLOGY | Facility: CLINIC | Age: 29
End: 2024-02-15
Payer: COMMERCIAL

## 2024-02-20 ENCOUNTER — PATIENT MESSAGE (OUTPATIENT)
Dept: CARDIOLOGY | Facility: CLINIC | Age: 29
End: 2024-02-20
Payer: COMMERCIAL

## 2024-02-20 ENCOUNTER — TELEPHONE (OUTPATIENT)
Dept: CARDIOLOGY | Facility: CLINIC | Age: 29
End: 2024-02-20
Payer: COMMERCIAL

## 2024-04-27 DIAGNOSIS — Q24.9 ADULT CONGENITAL HEART DISEASE: ICD-10-CM

## 2024-04-27 DIAGNOSIS — Q25.1 AORTIC COARCTATION: ICD-10-CM

## 2024-04-27 DIAGNOSIS — Q21.0 VSD (VENTRICULAR SEPTAL DEFECT): ICD-10-CM

## 2024-04-27 DIAGNOSIS — Z87.74 S/P COARCTATION STENT: ICD-10-CM

## 2024-04-27 DIAGNOSIS — Z95.828 S/P COARCTATION STENT: ICD-10-CM

## 2024-05-01 ENCOUNTER — PATIENT MESSAGE (OUTPATIENT)
Dept: CARDIOLOGY | Facility: CLINIC | Age: 29
End: 2024-05-01
Payer: COMMERCIAL

## 2024-05-01 RX ORDER — LISINOPRIL 5 MG/1
5 TABLET ORAL DAILY
Qty: 90 TABLET | Refills: 3 | OUTPATIENT
Start: 2024-05-01

## 2024-05-01 RX ORDER — LISINOPRIL 5 MG/1
5 TABLET ORAL DAILY
Qty: 90 TABLET | Refills: 3 | Status: SHIPPED | OUTPATIENT
Start: 2024-05-01

## 2024-05-27 ENCOUNTER — PATIENT MESSAGE (OUTPATIENT)
Dept: CARDIOLOGY | Facility: CLINIC | Age: 29
End: 2024-05-27
Payer: COMMERCIAL

## 2024-05-27 DIAGNOSIS — Q21.0 VSD (VENTRICULAR SEPTAL DEFECT): ICD-10-CM

## 2024-05-27 DIAGNOSIS — Q25.1 AORTIC COARCTATION: ICD-10-CM

## 2024-05-27 DIAGNOSIS — Z98.890 PERSONAL HISTORY OF SURGERY TO HEART AND GREAT VESSELS, PRESENTING HAZARDS TO HEALTH: ICD-10-CM

## 2024-05-27 DIAGNOSIS — Q24.9 CONGENITAL MALFORMATION OF HEART, UNSPECIFIED: Primary | ICD-10-CM

## 2024-05-27 DIAGNOSIS — Z95.828 S/P COARCTATION STENT: ICD-10-CM

## 2024-05-27 DIAGNOSIS — Z87.74 S/P COARCTATION STENT: ICD-10-CM

## 2024-05-27 DIAGNOSIS — Q24.9 ADULT CONGENITAL HEART DISEASE: ICD-10-CM

## 2024-08-08 ENCOUNTER — HOSPITAL ENCOUNTER (OUTPATIENT)
Dept: CARDIOLOGY | Facility: CLINIC | Age: 29
Discharge: HOME | End: 2024-08-08
Attending: PEDIATRICS
Payer: COMMERCIAL

## 2024-08-08 ENCOUNTER — OFFICE VISIT (OUTPATIENT)
Dept: CARDIOLOGY | Facility: CLINIC | Age: 29
End: 2024-08-08
Attending: PEDIATRICS
Payer: COMMERCIAL

## 2024-08-08 ENCOUNTER — HOSPITAL ENCOUNTER (OUTPATIENT)
Dept: RADIOLOGY | Facility: HOSPITAL | Age: 29
Discharge: HOME | End: 2024-08-08
Attending: PEDIATRICS
Payer: COMMERCIAL

## 2024-08-08 ENCOUNTER — PATIENT MESSAGE (OUTPATIENT)
Dept: CARDIOLOGY | Facility: CLINIC | Age: 29
End: 2024-08-08

## 2024-08-08 VITALS
SYSTOLIC BLOOD PRESSURE: 120 MMHG | BODY MASS INDEX: 22.73 KG/M2 | HEIGHT: 69 IN | DIASTOLIC BLOOD PRESSURE: 58 MMHG | OXYGEN SATURATION: 97 % | WEIGHT: 153.44 LBS | HEART RATE: 92 BPM

## 2024-08-08 DIAGNOSIS — Q20.3 TRANSPOSITION OF THE GREAT ARTERIES: ICD-10-CM

## 2024-08-08 DIAGNOSIS — Q21.0 VSD (VENTRICULAR SEPTAL DEFECT): ICD-10-CM

## 2024-08-08 DIAGNOSIS — Z87.74 S/P COARCTATION STENT: ICD-10-CM

## 2024-08-08 DIAGNOSIS — Z87.74 S/P COARCTATION STENT: Primary | ICD-10-CM

## 2024-08-08 DIAGNOSIS — Q24.9 CONGENITAL MALFORMATION OF HEART, UNSPECIFIED: ICD-10-CM

## 2024-08-08 DIAGNOSIS — Z95.828 S/P COARCTATION STENT: ICD-10-CM

## 2024-08-08 DIAGNOSIS — Q25.1 AORTIC COARCTATION: ICD-10-CM

## 2024-08-08 DIAGNOSIS — Q24.9 ADULT CONGENITAL HEART DISEASE: ICD-10-CM

## 2024-08-08 DIAGNOSIS — Z98.890 PERSONAL HISTORY OF SURGERY TO HEART AND GREAT VESSELS, PRESENTING HAZARDS TO HEALTH: ICD-10-CM

## 2024-08-08 DIAGNOSIS — Z95.828 S/P COARCTATION STENT: Primary | ICD-10-CM

## 2024-08-08 LAB
OHS QRS DURATION: 148 MS
OHS QTC CALCULATION: 479 MS

## 2024-08-08 PROCEDURE — 75561 CARDIAC MRI FOR MORPH W/DYE: CPT | Mod: TC

## 2024-08-08 PROCEDURE — A9585 GADOBUTROL INJECTION: HCPCS | Performed by: PEDIATRICS

## 2024-08-08 PROCEDURE — 99214 OFFICE O/P EST MOD 30 MIN: CPT | Mod: 25,S$GLB,, | Performed by: PEDIATRICS

## 2024-08-08 PROCEDURE — 4010F ACE/ARB THERAPY RXD/TAKEN: CPT | Mod: CPTII,S$GLB,, | Performed by: PEDIATRICS

## 2024-08-08 PROCEDURE — 25500020 PHARM REV CODE 255: Performed by: PEDIATRICS

## 2024-08-08 PROCEDURE — 3074F SYST BP LT 130 MM HG: CPT | Mod: CPTII,S$GLB,, | Performed by: PEDIATRICS

## 2024-08-08 PROCEDURE — 99999 PR PBB SHADOW E&M-EST. PATIENT-LVL III: CPT | Mod: PBBFAC,,, | Performed by: PEDIATRICS

## 2024-08-08 PROCEDURE — 3078F DIAST BP <80 MM HG: CPT | Mod: CPTII,S$GLB,, | Performed by: PEDIATRICS

## 2024-08-08 PROCEDURE — 3008F BODY MASS INDEX DOCD: CPT | Mod: CPTII,S$GLB,, | Performed by: PEDIATRICS

## 2024-08-08 PROCEDURE — 93005 ELECTROCARDIOGRAM TRACING: CPT | Mod: S$GLB,,, | Performed by: PEDIATRICS

## 2024-08-08 PROCEDURE — 93010 ELECTROCARDIOGRAM REPORT: CPT | Mod: S$GLB,,, | Performed by: INTERNAL MEDICINE

## 2024-08-08 RX ORDER — GADOBUTROL 604.72 MG/ML
10 INJECTION INTRAVENOUS
Status: COMPLETED | OUTPATIENT
Start: 2024-08-08 | End: 2024-08-08

## 2024-08-08 RX ADMIN — GADOBUTROL 10 ML: 604.72 INJECTION INTRAVENOUS at 01:08

## 2024-08-31 ENCOUNTER — PATIENT MESSAGE (OUTPATIENT)
Dept: CARDIOLOGY | Facility: CLINIC | Age: 29
End: 2024-08-31
Payer: COMMERCIAL

## 2025-07-21 ENCOUNTER — TELEPHONE (OUTPATIENT)
Dept: CARDIOLOGY | Facility: CLINIC | Age: 30
End: 2025-07-21
Payer: COMMERCIAL

## 2025-07-21 DIAGNOSIS — Q20.3 TRANSPOSITION OF THE GREAT ARTERIES: ICD-10-CM

## 2025-07-21 DIAGNOSIS — Z87.74 S/P COARCTATION STENT: ICD-10-CM

## 2025-07-21 DIAGNOSIS — Z45.09 ENCOUNTER FOR LOOP RECORDER AT END OF BATTERY LIFE: ICD-10-CM

## 2025-07-21 DIAGNOSIS — Z95.828 S/P COARCTATION STENT: ICD-10-CM

## 2025-07-21 DIAGNOSIS — Z98.890 PERSONAL HISTORY OF SURGERY TO HEART AND GREAT VESSELS, PRESENTING HAZARDS TO HEALTH: ICD-10-CM

## 2025-07-21 DIAGNOSIS — Q25.1 AORTIC COARCTATION: ICD-10-CM

## 2025-07-21 DIAGNOSIS — Q24.9 ADULT CONGENITAL HEART DISEASE: ICD-10-CM

## 2025-07-21 DIAGNOSIS — Q21.0 VSD (VENTRICULAR SEPTAL DEFECT): Primary | ICD-10-CM

## 2025-07-21 NOTE — TELEPHONE ENCOUNTER
Copied from CRM #[id number]. Topic: Appointments - Appointment Access  >> Jul 21, 2025 11:59 AM Mendel wrote:  Name of Who is Calling: pt        What is the request in detail: calling to schedule annual appt        Can the clinic reply by [facility]: no        What Number to Call Back if not in [facility]:[phone]

## 2025-07-21 NOTE — TELEPHONE ENCOUNTER
Add the echo, EKG and holter to the appointment scheduled with Dr. Godfrey on 7/24 new start time around 1:15 PM. Left all information on voicemail including callback name and number

## 2025-07-24 ENCOUNTER — OFFICE VISIT (OUTPATIENT)
Dept: CARDIOLOGY | Facility: CLINIC | Age: 30
End: 2025-07-24
Payer: COMMERCIAL

## 2025-07-24 ENCOUNTER — HOSPITAL ENCOUNTER (OUTPATIENT)
Dept: CARDIOLOGY | Facility: HOSPITAL | Age: 30
Discharge: HOME | End: 2025-07-24
Attending: PEDIATRICS
Payer: COMMERCIAL

## 2025-07-24 ENCOUNTER — HOSPITAL ENCOUNTER (OUTPATIENT)
Dept: CARDIOLOGY | Facility: CLINIC | Age: 30
Discharge: HOME | End: 2025-07-24
Payer: COMMERCIAL

## 2025-07-24 ENCOUNTER — HOSPITAL ENCOUNTER (OUTPATIENT)
Dept: PEDIATRIC CARDIOLOGY | Facility: HOSPITAL | Age: 30
Discharge: HOME | End: 2025-07-24
Attending: PEDIATRICS
Payer: COMMERCIAL

## 2025-07-24 VITALS — WEIGHT: 153 LBS | BODY MASS INDEX: 22.66 KG/M2 | HEIGHT: 69 IN

## 2025-07-24 VITALS
HEIGHT: 69 IN | BODY MASS INDEX: 23.9 KG/M2 | OXYGEN SATURATION: 98 % | WEIGHT: 161.38 LBS | HEART RATE: 91 BPM | DIASTOLIC BLOOD PRESSURE: 66 MMHG | SYSTOLIC BLOOD PRESSURE: 135 MMHG

## 2025-07-24 DIAGNOSIS — Q25.1 AORTIC COARCTATION: ICD-10-CM

## 2025-07-24 DIAGNOSIS — Z45.09 ENCOUNTER FOR LOOP RECORDER AT END OF BATTERY LIFE: ICD-10-CM

## 2025-07-24 DIAGNOSIS — Q20.3 TRANSPOSITION OF THE GREAT ARTERIES: ICD-10-CM

## 2025-07-24 DIAGNOSIS — Z98.890 PERSONAL HISTORY OF SURGERY TO HEART AND GREAT VESSELS, PRESENTING HAZARDS TO HEALTH: ICD-10-CM

## 2025-07-24 DIAGNOSIS — Z87.74 S/P COARCTATION STENT: ICD-10-CM

## 2025-07-24 DIAGNOSIS — Z95.828 S/P COARCTATION STENT: ICD-10-CM

## 2025-07-24 DIAGNOSIS — Q24.9 ADULT CONGENITAL HEART DISEASE: ICD-10-CM

## 2025-07-24 DIAGNOSIS — Q21.0 VSD (VENTRICULAR SEPTAL DEFECT): Primary | ICD-10-CM

## 2025-07-24 DIAGNOSIS — Q21.0 VSD (VENTRICULAR SEPTAL DEFECT): ICD-10-CM

## 2025-07-24 LAB
AORTIC SIZE INDEX: 1.5 CM/M2
ASCENDING AORTA: 2.8 CM
AV AREA BY CONTINUOUS VTI: 4.6 CM2
AV INDEX (PROSTH): 1.02
AV LVOT MEAN GRADIENT: 2 MMHG
AV LVOT PEAK GRADIENT: 4 MMHG
AV MEAN GRADIENT: 2 MMHG
AV PEAK GRADIENT: 4 MMHG
AV VALVE AREA BY VELOCITY RATIO: 4.5 CM²
AV VALVE AREA: 4.6 CM2
AV VELOCITY RATIO: 1
BSA FOR ECHO PROCEDURE: 1.84 M2
CV ECHO LV RWT: 0.39 CM
DOP CALC AO PEAK VEL: 1 M/S
DOP CALC AO VTI: 16 CM
DOP CALC LVOT AREA: 4.5 CM2
DOP CALC LVOT DIAMETER: 2.4 CM
DOP CALC LVOT PEAK VEL: 1 M/S
DOP CALC LVOT STROKE VOLUME: 73.7 CM3
DOP CALCLVOT PEAK VEL VTI: 16.3 CM
E WAVE DECELERATION TIME: 109 MS
E/A RATIO: 1.37
E/E' RATIO: 5 M/S
ECHO EF ESTIMATED: 36 %
ECHO LV POSTERIOR WALL: 0.9 CM (ref 0.6–1.1)
FRACTIONAL SHORTENING: 17.4 % (ref 28–44)
INTERVENTRICULAR SEPTUM: 0.8 CM (ref 0.6–1.1)
LA MAJOR: 4.7 CM
LA MINOR: 3.2 CM
LA WIDTH: 2.4 CM
LEFT ATRIUM SIZE: 2.5 CM
LEFT ATRIUM VOLUME INDEX MOD: 14 ML/M2
LEFT ATRIUM VOLUME INDEX: 11 ML/M2
LEFT ATRIUM VOLUME MOD: 26 ML
LEFT ATRIUM VOLUME: 19 CM3
LEFT INTERNAL DIMENSION IN SYSTOLE: 3.8 CM (ref 2.1–4)
LEFT VENTRICLE DIASTOLIC VOLUME INDEX: 51.63 ML/M2
LEFT VENTRICLE DIASTOLIC VOLUME: 95 ML
LEFT VENTRICLE MASS INDEX: 69.4 G/M2
LEFT VENTRICLE SYSTOLIC VOLUME INDEX: 33.2 ML/M2
LEFT VENTRICLE SYSTOLIC VOLUME: 61 ML
LEFT VENTRICULAR INTERNAL DIMENSION IN DIASTOLE: 4.6 CM (ref 3.5–6)
LEFT VENTRICULAR MASS: 127.7 G
LV LATERAL E/E' RATIO: 4.7
LV SEPTAL E/E' RATIO: 5
Lab: 1.6 CM/M
MV PEAK A VEL: 0.51 M/S
MV PEAK E VEL: 0.7 M/S
OHS CV CPX PATIENT HEIGHT IN: 69
OHS CV RV/LV RATIO: 0.78 CM
OHS QRS DURATION: 144 MS
OHS QTC CALCULATION: 484 MS
PISA TR MAX VEL: 2.7 M/S
RA MAJOR: 4.59 CM
RA WIDTH: 3.02 CM
RIGHT ATRIAL AREA: 11.8 CM2
RIGHT VENTRICLE DIASTOLIC BASEL DIMENSION: 3.6 CM
RV TISSUE DOPPLER FREE WALL SYSTOLIC VELOCITY 1 (APICAL 4 CHAMBER VIEW): 8.15 CM/S
TDI LATERAL: 0.15 M/S
TDI SEPTAL: 0.14 M/S
TDI: 0.15 M/S
TRICUSPID ANNULAR PLANE SYSTOLIC EXCURSION: 1.4 CM
TV PEAK GRADIENT: 30 MMHG
Z-SCORE OF LEFT VENTRICULAR DIMENSION IN END DIASTOLE: -1.04
Z-SCORE OF LEFT VENTRICULAR DIMENSION IN END SYSTOLE: 1.48

## 2025-07-24 PROCEDURE — 3078F DIAST BP <80 MM HG: CPT | Mod: CPTII,S$GLB,, | Performed by: PEDIATRICS

## 2025-07-24 PROCEDURE — 4010F ACE/ARB THERAPY RXD/TAKEN: CPT | Mod: CPTII,S$GLB,, | Performed by: PEDIATRICS

## 2025-07-24 PROCEDURE — 99999 PR PBB SHADOW E&M-EST. PATIENT-LVL III: CPT | Mod: PBBFAC,,, | Performed by: PEDIATRICS

## 2025-07-24 PROCEDURE — 1159F MED LIST DOCD IN RCRD: CPT | Mod: CPTII,S$GLB,, | Performed by: PEDIATRICS

## 2025-07-24 PROCEDURE — 93320 DOPPLER ECHO COMPLETE: CPT

## 2025-07-24 PROCEDURE — 93325 DOPPLER ECHO COLOR FLOW MAPG: CPT | Mod: 26,,, | Performed by: PEDIATRICS

## 2025-07-24 PROCEDURE — 93320 DOPPLER ECHO COMPLETE: CPT | Mod: 26,,, | Performed by: PEDIATRICS

## 2025-07-24 PROCEDURE — 3008F BODY MASS INDEX DOCD: CPT | Mod: CPTII,S$GLB,, | Performed by: PEDIATRICS

## 2025-07-24 PROCEDURE — 99214 OFFICE O/P EST MOD 30 MIN: CPT | Mod: 25,S$GLB,, | Performed by: PEDIATRICS

## 2025-07-24 PROCEDURE — 93303 ECHO TRANSTHORACIC: CPT | Mod: 26,,, | Performed by: PEDIATRICS

## 2025-07-24 PROCEDURE — 93010 ELECTROCARDIOGRAM REPORT: CPT | Mod: S$GLB,,, | Performed by: INTERNAL MEDICINE

## 2025-07-24 PROCEDURE — 3075F SYST BP GE 130 - 139MM HG: CPT | Mod: CPTII,S$GLB,, | Performed by: PEDIATRICS

## 2025-07-24 NOTE — PROGRESS NOTES
2025    re:Livia May  :1995    [facility]    Brigida Arellano MD  [address] [facility] [place] [address]     Dear Dr. Arellano:        Patient ID:  Livia May is a 29 y.o. male who presents for follow-up of complex congenital heart disease.  To summarize, his diagnoses are as follows:  1.  TGA/VSD s/p arterial switch procedure and VSD closure (Dr. Raygoza)   - Residual small LV to RA shunt (very small shunt noted on  cardiac catheterization)    - Normal coronaries on cath , CTA 2018  - mild aortic insufficiency and aortic root enlargement  - Very mild branch pulmonary artery stenosis, difficult to image on echo  2.  Coarctation stent ( -Claudio).   - very mild (7-13mmHg) arm/leg blood pressure gradient  3.  Likely PFO (cath ).  Positive right to left bubble study in the past.  No migraines or TIA symptoms.  4.  Mildly abnormal appearing tricuspid valve without significant tricuspid insufficiency or stenosis  5.  Syncope, 2018, likely vasovagal in origin (while fingernail clipping).  Extensive workup negative.  s/p loop recorder.    My recommendations are as follows:  1.  I would recommend endocarditis prophylaxis before dental work due to the residual ventricular septal defect.  2.  Check baseline labs today.  3.  BP mildly elevated - continue lisinopril  4.  Continue increased fluid intake.  Sit down immediately if he becomes lightheaded.  Otherwise, there is no need for activity restriction or endocarditis prophylaxis.  5.  Any migraines or TIA like symptoms would prompt consideration of a repeat cardiac catheterization with transesophageal echocardiogram and closure of his patent foramen ovale given the right to left bubble study.  A repeat bubble study would be obtained in the lab to make sure there was no ventricular level shunting although this is obviously extremely unlikely.  6.  Otherwise follow-up with me in 1 year with echo,  ekg, holter.    Discussion:  He looks great.  His blood pressure remains very mildly elevated, so I continued his lisinopril.  I will see him again in a year.    History of present illness:  He has done very well since I last saw him in clinic a year ago.  No chest pain, palpitations, syncope, near syncope, cyanosis, or edema.  No problems doing his regular activities.  He has 1 more year left in his undergraduate degree, and he will then get a master's degree in definity.  He would like to be a hospital .    The review of systems is as noted above. It is otherwise negative for other symptoms related to the general, neurological, psychiatric, endocrine, gastrointestinal, genitourinary, respiratory, dermatologic, musculoskeletal, hematologic, and immunologic systems.    Past Medical History:   Diagnosis Date    Coarctation of aorta, congenital     Diaphragmatic paralysis     Strabismus     Syncope and collapse 06/2018    TGA/VSD (transposition of great arteries, ventricular septal defect)      Past Surgical History:   Procedure Laterality Date    ABLATION N/A 6/12/2018    Procedure: ABLATION;  Surgeon: Octaviano Ley MD;  Location: [facility] CATH LAB;  Service: Cardiology;  Laterality: N/A;  syncope/SVT, EPS, +/-RFA, +/-ICD, +/-PPM, +/-ILR, Anes AO, DM, 353A (peds congenital)    ARTERIAL SWITCH W/ VENTRICULAR SEPTAL DEFECT CLOSURE      CARDIAC CATHETERIZATION  10/11/07    CARDIAC CATHETERIZATION  1/17/12    CARDIAC ELECTROPHYSIOLOGY STUDY N/A 6/12/2018    Procedure: STUDY-EP;  Surgeon: Octaviano Ley MD;  Location: [facility] CATH LAB;  Service: Cardiology;  Laterality: N/A;    coarctation of aorta repair/with TGA switch      DIAPHRAGM PLICATION      LOOP RECORDER IMPLANT  06/12/2018    REMOVAL OF IMPLANTABLE LOOP RECORDER N/A 10/19/2020    Procedure: REMOVAL, IMPLANTABLE LOOP RECORDER;  Surgeon: Octaviano Ley MD;  Location: [facility] EP LAB;  Service: Cardiology;  Laterality: N/A;    STRABISMUS SURGERY       Family  History   Problem Relation Name Age of Onset    Asthma Brother britt     Congenital heart disease Neg Hx      Early death Neg Hx       Social History     Socioeconomic History    Marital status: Single   Tobacco Use    Smoking status: Never    Smokeless tobacco: Never   Substance and Sexual Activity    Alcohol use: No    Drug use: No   Social History Narrative    Just graduated from [employer] with a degree in general studies.  Looking for a job.     Social Drivers of Health     Financial Resource Strain: Low Risk  (7/22/2025)    Overall Financial Resource Strain (CARDIA)     Difficulty of Paying Living Expenses: Not hard at all   Food Insecurity: No Food Insecurity (7/22/2025)    Hunger Vital Sign     Worried About Running Out of Food in the Last Year: Never true     Ran Out of Food in the Last Year: Never true   Transportation Needs: No Transportation Needs (7/22/2025)    PRAPARE - Transportation     Lack of Transportation (Medical): No     Lack of Transportation (Non-Medical): No   Physical Activity: Insufficiently Active (7/22/2025)    Exercise Vital Sign     Days of Exercise per Week: 3 days     Minutes of Exercise per Session: 20 min   Stress: No Stress Concern Present (7/22/2025)    [employer] - Occupational Stress Questionnaire     Feeling of Stress : Only a little   Housing Stability: Low Risk  (7/22/2025)    Housing Stability Vital Sign     Unable to Pay for Housing in the Last Year: No     Number of Times Moved in the Last Year: 0     Homeless in the Last Year: No     Current Outpatient Medications on File Prior to Visit   Medication Sig Dispense Refill    lisinopriL (PRINIVIL,ZESTRIL) 5 MG tablet Take 1 tablet (5 mg total) by mouth once daily. 90 tablet 3     No current facility-administered medications on file prior to visit.     Review of patient's allergies indicates:  No Known Allergies    /66 (BP Location: Right arm, Patient Position: Sitting)   Pulse 91    "Ht 5' 9.02" (1.753 m)   Wt 73.2 kg (161 lb 6 oz)   SpO2 98%   BMI 23.82 kg/m²   Vitals:    07/24/25 1403   BP: 135/66   BP Location: Right arm   Patient Position: Sitting   Pulse: 91   SpO2: 98%   Weight: 73.2 kg (161 lb 6 oz)   Height: 5' 9.02" (1.753 m)          Objective:    Physical Exam  Constitutional:       General: He is not in acute distress.     Appearance: He is well-developed. He is not diaphoretic.   HENT:      Head: Normocephalic and atraumatic.      Right Ear: External ear normal.      Left Ear: External ear normal.      Nose: Nose normal.      Mouth/Throat:      Pharynx: No oropharyngeal exudate.   Eyes:      General: No scleral icterus.        Right eye: No discharge.         Left eye: No discharge.      Conjunctiva/sclera: Conjunctivae normal.      Pupils: Pupils are equal, round, and reactive to light.   Neck:      Thyroid: No thyromegaly.      Vascular: No JVD.      Trachea: No tracheal deviation.   Cardiovascular:      Rate and Rhythm: Normal rate and regular rhythm.      Pulses: Intact distal pulses.           Carotid pulses are 2+ on the right side and 2+ on the left side.       Radial pulses are 2+ on the right side and 2+ on the left side.        Femoral pulses are 2+ on the right side and 2+ on the left side.       Dorsalis pedis pulses are 2+ on the right side and 2+ on the left side.      Heart sounds: S1 normal and S2 normal. No murmur heard.     High-pitched blowing early systolic murmur is present at the lower left sternal border.      No friction rub. No gallop.      Comments: 2/6 harsh, squeaky murmur at LLSB and apex  Pulmonary:      Effort: Pulmonary effort is normal. No respiratory distress.      Breath sounds: Normal breath sounds. No stridor. No wheezing or rales.   Chest:      Chest wall: No tenderness.   Abdominal:      General: Bowel sounds are normal. There is no distension.      Palpations: Abdomen is soft. There is no mass.      Tenderness: There is no abdominal " tenderness. There is no guarding or rebound.   Musculoskeletal:         General: No tenderness or deformity. Normal range of motion.      Cervical back: Normal range of motion and neck supple.   Lymphadenopathy:      Cervical: No cervical adenopathy.   Skin:     General: Skin is warm and dry.   Neurological:      Mental Status: He is alert and oriented to person, place, and time.      Cranial Nerves: No cranial nerve deficit.      Motor: No abnormal muscle tone.      Coordination: Coordination normal.   Psychiatric:         Behavior: Behavior normal.         Thought Content: Thought content normal.         Judgment: Judgment normal.           An EKG performed in clinic today reveals normal sinus rhythm with a right bundle branch block.  It is unchanged.    Results for orders placed during the hospital encounter of 07/24/25    Echo    IMPRESSION:  Technically difficult study consistent with diagnosis of d-TGA s/p arterial switch & coarctation s/p stent:  Color doppler demonstrates small left to right shunt at PFO.  There is prolapse of the tricuspid valve leaflets into the right atrium with reasonable coaptation and mild tricuspid regurgitation arising at several distinct jets.  Previously demonstrated trivial jet to the RA along the atrial septum with velocity suggesting small LV to RA shunt not appreciated in this study.  Right ventricle appears mildly dilated with qualitatively good function.  Right ventricular pressure estimated >30 mmHg above right atrial pressure from reasonably defined TR doppler profile.  There is no residual VSD demonstrated.  Color Doppler suggests at least a mild degree of pulmonary insufficiency.  Main and pulmonary branches are not demonstrated in this study with the anatomy confounded by Rhonda maneuver for arterial switch.  The left atrium is distorted by extracardiac structures (probably related to scoliosis and position of heart in thorax).  Redundant anterior mitral leaflet with  prominent prolapse in several views and no significant insufficiency demonstrated.  Left ventricle appears normal in size and structure.  Paradoxical septal motion with good movement of the left ventricular free wall,  SF=31% and EF qualitatively estimated 50 -55% from apical views  Global left ventricular longitudinal strain abnormal - peak average measured -16.1%.  Structure of the aortic (boubacar aortic) valve not demonstrated in this study.  Trivial to mild aortic regurgitation with at least two discrete jets.  Aortic dimensions:  Sinuses of Valsalva   = 33 mm.  ST junction                = 24 mm.  Ascending aorta        = 37 mm (not well demonstrated).  Echodensity consistent with stent in descending aorta at site of coarctation with peak velocity estimated <2.4 m/s and no diastolic runoff.  There is no pericardial effusion.    MRI 8/8/2024:  There is a leak at the superior VSD patch resulting in small LV to RA shunt  The tricuspid valve leaflets are redundant and prolapse with regurgitation noted.   Increased right ventricular volumes. RVEF 48%.  Pulmonary insufficiency, regurgitant fraction 12%, regurgitant volume 10 ml.    Supravalvar RVOT narrowing noted as described above.   The pulmonary arteries are draped anterior to the aorta s/p [place] maneuver. The branch pulmonary arteries are fairly symmetric with mild narrowing of proximal RPA and otherwise normally sized PAs. No flow discrepancy.   The mitral valve leaflets prolapse without significant MR noted.   The left ventricular volumes are top normal/mildly increased with LVEF 50%.  Trivial/mild aortic insufficiency, regurgitant fraction 6%.  Enlarged aortic root at the sinuses of Valsalva, 41mm.   Coarctation stent without evidence of dilation or aneurysm proximal or distal to stent.    Cardiac MRI performed October 22, 2019:  Conclusion: D-TGA with arterial switch. History of Coarcatation stent. Dilated aortic root  Increased RV diastolic volume with RVEF  of 44%  There is mild PI with peak velocity through the valve of 1.8 m/sec   There is no stenosis of the right or left PA. There is equal flow through both PA.  Increased LV volumes with LVEF of 46%  Aortic valve appears tricuspid with trivial to mild AI  The sinus of Valsalva mildly dilated measures 39 to 40 mm depending on the method.    Ascending aorta measures 25 mm  Good visualization of the stent in the descending aorta without significant stenosis (both on angio and candy cane black blood images)    CTA June 2018:  1.  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  No stenosis identified in the reimplanted coronary arteries.  2.  Possible thickening of the anterior leaflet of the mitral valve; please correlate with echocardiography.  3.  I suspect perimembranous VSD patch.  4.  Measurements are provided for the aorta and pulmonary arteries.  Stent in the proximal descending aorta represents treatment for coarctation.  5.  Arch and isthmus are incompletely included on this study diverted to assessment for coronary artery anatomy and caliber.    EP study 6/12/18:  1. Not inducible for sustained VT or VF with up to 3 VES, from 2 different RV sites, in the baseline state or with isuprel infusion.  2. Normal AH, HV.  3. Normal CSNRT.  4. Normal response to procainamide challenge.  5. Not inducible for sustained SVT with up to double AES.  6. Successful placement, and later removal, of a right femoral arterial sheath for BP monitoring.    I reviewed the cardiac catheterization report from January 12, 2012:  1.  A small residual left ventricle to right atrial shunt was noted.  2.  The coarctation was stented with a 26 x 10 Tito LD stent  3.  There was about a 14-16 mmHg gradient from the right ventricle into the branch pulmonary arteries    A cardiac catheterization in 2007 revealed normal coronary arteries.     Thank you for referring this  patient to our clinic.  Please call with any questions.    Sincerely,        Patrick Godfrey MD  Pediatric Cardiology  Adult Congenital Heart Disease  Pediatric Heart Failure and Transplantation  [facility] [address] [place]  [address]  [phone]

## (undated) DEVICE — ELECTRODE REM PLYHSV RETURN 9

## (undated) DEVICE — PACK PACER PERMANENT

## (undated) DEVICE — ADHESIVE DERMABOND ADVANCED

## (undated) DEVICE — DRESSING AQUACEL AG FOAM 4X4